# Patient Record
Sex: MALE | Race: WHITE | Employment: OTHER | URBAN - METROPOLITAN AREA
[De-identification: names, ages, dates, MRNs, and addresses within clinical notes are randomized per-mention and may not be internally consistent; named-entity substitution may affect disease eponyms.]

---

## 2017-01-09 ENCOUNTER — OFFICE VISIT (OUTPATIENT)
Dept: ONCOLOGY | Facility: CLINIC | Age: 82
End: 2017-01-09

## 2017-01-09 VITALS
RESPIRATION RATE: 16 BRPM | DIASTOLIC BLOOD PRESSURE: 89 MMHG | TEMPERATURE: 97.6 F | WEIGHT: 186.5 LBS | HEART RATE: 55 BPM | SYSTOLIC BLOOD PRESSURE: 185 MMHG | BODY MASS INDEX: 25.26 KG/M2

## 2017-01-09 DIAGNOSIS — C02.9 TONGUE CANCER (HCC): Primary | ICD-10-CM

## 2017-01-09 PROCEDURE — G8420 CALC BMI NORM PARAMETERS: HCPCS | Performed by: INTERNAL MEDICINE

## 2017-01-09 PROCEDURE — G8427 DOCREV CUR MEDS BY ELIG CLIN: HCPCS | Performed by: INTERNAL MEDICINE

## 2017-01-09 PROCEDURE — 1036F TOBACCO NON-USER: CPT | Performed by: INTERNAL MEDICINE

## 2017-01-09 PROCEDURE — G8484 FLU IMMUNIZE NO ADMIN: HCPCS | Performed by: INTERNAL MEDICINE

## 2017-01-09 PROCEDURE — 4040F PNEUMOC VAC/ADMIN/RCVD: CPT | Performed by: INTERNAL MEDICINE

## 2017-01-09 PROCEDURE — 99214 OFFICE O/P EST MOD 30 MIN: CPT | Performed by: INTERNAL MEDICINE

## 2017-01-09 PROCEDURE — 1123F ACP DISCUSS/DSCN MKR DOCD: CPT | Performed by: INTERNAL MEDICINE

## 2017-01-30 RX ORDER — GLIMEPIRIDE 4 MG/1
TABLET ORAL
Qty: 60 TABLET | Refills: 0 | Status: SHIPPED | OUTPATIENT
Start: 2017-01-30 | End: 2017-05-27 | Stop reason: SDUPTHER

## 2017-04-25 ENCOUNTER — HOSPITAL ENCOUNTER (OUTPATIENT)
Dept: RADIATION ONCOLOGY | Age: 82
Discharge: HOME | End: 2017-04-25
Payer: MEDICARE

## 2017-04-25 PROCEDURE — 99212 OFFICE O/P EST SF 10 MIN: CPT | Performed by: RADIOLOGY

## 2017-05-30 RX ORDER — GLIMEPIRIDE 4 MG/1
TABLET ORAL
Qty: 60 TABLET | Refills: 3 | Status: SHIPPED | OUTPATIENT
Start: 2017-05-30 | End: 2018-01-24 | Stop reason: SDUPTHER

## 2017-06-13 ENCOUNTER — OFFICE VISIT (OUTPATIENT)
Dept: INTERNAL MEDICINE CLINIC | Age: 82
End: 2017-06-13
Payer: MEDICARE

## 2017-06-13 VITALS
DIASTOLIC BLOOD PRESSURE: 76 MMHG | WEIGHT: 185 LBS | BODY MASS INDEX: 25.06 KG/M2 | SYSTOLIC BLOOD PRESSURE: 132 MMHG | HEIGHT: 72 IN

## 2017-06-13 DIAGNOSIS — K59.01 SLOW TRANSIT CONSTIPATION: ICD-10-CM

## 2017-06-13 DIAGNOSIS — E11.40 TYPE 2 DIABETES MELLITUS WITH DIABETIC NEUROPATHY, WITH LONG-TERM CURRENT USE OF INSULIN (HCC): Primary | ICD-10-CM

## 2017-06-13 DIAGNOSIS — I95.1 ORTHOSTATIC HYPOTENSION: ICD-10-CM

## 2017-06-13 DIAGNOSIS — Z79.4 TYPE 2 DIABETES MELLITUS WITH DIABETIC NEUROPATHY, WITH LONG-TERM CURRENT USE OF INSULIN (HCC): Primary | ICD-10-CM

## 2017-06-13 DIAGNOSIS — C02.9 TONGUE CANCER (HCC): ICD-10-CM

## 2017-06-13 DIAGNOSIS — E11.42 DIABETIC POLYNEUROPATHY ASSOCIATED WITH TYPE 2 DIABETES MELLITUS (HCC): ICD-10-CM

## 2017-06-13 PROCEDURE — 99214 OFFICE O/P EST MOD 30 MIN: CPT | Performed by: INTERNAL MEDICINE

## 2017-06-13 PROCEDURE — 1036F TOBACCO NON-USER: CPT | Performed by: INTERNAL MEDICINE

## 2017-06-13 PROCEDURE — G8427 DOCREV CUR MEDS BY ELIG CLIN: HCPCS | Performed by: INTERNAL MEDICINE

## 2017-06-13 PROCEDURE — G8419 CALC BMI OUT NRM PARAM NOF/U: HCPCS | Performed by: INTERNAL MEDICINE

## 2017-06-13 PROCEDURE — 1123F ACP DISCUSS/DSCN MKR DOCD: CPT | Performed by: INTERNAL MEDICINE

## 2017-06-13 PROCEDURE — 4040F PNEUMOC VAC/ADMIN/RCVD: CPT | Performed by: INTERNAL MEDICINE

## 2017-06-13 ASSESSMENT — ENCOUNTER SYMPTOMS
WHEEZING: 0
EYE DISCHARGE: 0
COUGH: 0
COLOR CHANGE: 0
SHORTNESS OF BREATH: 0
EYE PAIN: 0
TROUBLE SWALLOWING: 0
CONSTIPATION: 1
BLOOD IN STOOL: 0
DIARRHEA: 0
ABDOMINAL DISTENTION: 0

## 2017-06-13 ASSESSMENT — PATIENT HEALTH QUESTIONNAIRE - PHQ9
SUM OF ALL RESPONSES TO PHQ QUESTIONS 1-9: 0
1. LITTLE INTEREST OR PLEASURE IN DOING THINGS: 0
2. FEELING DOWN, DEPRESSED OR HOPELESS: 0
SUM OF ALL RESPONSES TO PHQ9 QUESTIONS 1 & 2: 0

## 2017-07-06 ENCOUNTER — HOSPITAL ENCOUNTER (OUTPATIENT)
Dept: CT IMAGING | Age: 82
Discharge: HOME | End: 2017-07-06
Attending: INTERNAL MEDICINE | Admitting: INTERNAL MEDICINE
Payer: MEDICARE

## 2017-07-06 DIAGNOSIS — C02.9 TONGUE CANCER (HCC): ICD-10-CM

## 2017-07-06 LAB
BUN BLDV-MCNC: 25 MG/DL (ref 8–23)
CREAT SERPL-MCNC: 0.77 MG/DL (ref 0.7–1.2)
GFR AFRICAN AMERICAN: >60 ML/MIN
GFR NON-AFRICAN AMERICAN: >60 ML/MIN
GFR SERPL CREATININE-BSD FRML MDRD: ABNORMAL ML/MIN/{1.73_M2}
GFR SERPL CREATININE-BSD FRML MDRD: ABNORMAL ML/MIN/{1.73_M2}

## 2017-07-06 PROCEDURE — 70491 CT SOFT TISSUE NECK W/DYE: CPT

## 2017-07-06 PROCEDURE — 6360000004 HC RX CONTRAST MEDICATION: Performed by: INTERNAL MEDICINE

## 2017-07-06 PROCEDURE — 36415 COLL VENOUS BLD VENIPUNCTURE: CPT

## 2017-07-06 PROCEDURE — 2580000003 HC RX 258: Performed by: INTERNAL MEDICINE

## 2017-07-06 PROCEDURE — 84520 ASSAY OF UREA NITROGEN: CPT

## 2017-07-06 PROCEDURE — 82565 ASSAY OF CREATININE: CPT

## 2017-07-06 RX ORDER — 0.9 % SODIUM CHLORIDE 0.9 %
100 INTRAVENOUS SOLUTION INTRAVENOUS ONCE
Status: COMPLETED | OUTPATIENT
Start: 2017-07-06 | End: 2017-07-06

## 2017-07-06 RX ORDER — SODIUM CHLORIDE 0.9 % (FLUSH) 0.9 %
10 SYRINGE (ML) INJECTION PRN
Status: DISCONTINUED | OUTPATIENT
Start: 2017-07-06 | End: 2017-07-09 | Stop reason: HOSPADM

## 2017-07-06 RX ADMIN — IOVERSOL 70 ML: 741 INJECTION INTRA-ARTERIAL; INTRAVENOUS at 11:00

## 2017-07-06 RX ADMIN — Medication 10 ML: at 11:07

## 2017-07-06 RX ADMIN — SODIUM CHLORIDE 100 ML: 9 INJECTION, SOLUTION INTRAVENOUS at 11:00

## 2017-07-10 ENCOUNTER — OFFICE VISIT (OUTPATIENT)
Dept: ONCOLOGY | Age: 82
End: 2017-07-10
Payer: MEDICARE

## 2017-07-10 VITALS
HEART RATE: 47 BPM | TEMPERATURE: 98.3 F | SYSTOLIC BLOOD PRESSURE: 117 MMHG | BODY MASS INDEX: 25.13 KG/M2 | WEIGHT: 185.5 LBS | DIASTOLIC BLOOD PRESSURE: 66 MMHG

## 2017-07-10 DIAGNOSIS — C02.9 TONGUE CANCER (HCC): Primary | ICD-10-CM

## 2017-07-10 PROCEDURE — 99214 OFFICE O/P EST MOD 30 MIN: CPT | Performed by: INTERNAL MEDICINE

## 2017-07-10 PROCEDURE — 4040F PNEUMOC VAC/ADMIN/RCVD: CPT | Performed by: INTERNAL MEDICINE

## 2017-07-10 PROCEDURE — 1036F TOBACCO NON-USER: CPT | Performed by: INTERNAL MEDICINE

## 2017-07-10 PROCEDURE — G8419 CALC BMI OUT NRM PARAM NOF/U: HCPCS | Performed by: INTERNAL MEDICINE

## 2017-07-10 PROCEDURE — 99211 OFF/OP EST MAY X REQ PHY/QHP: CPT

## 2017-07-10 PROCEDURE — G8427 DOCREV CUR MEDS BY ELIG CLIN: HCPCS | Performed by: INTERNAL MEDICINE

## 2017-07-10 PROCEDURE — 1123F ACP DISCUSS/DSCN MKR DOCD: CPT | Performed by: INTERNAL MEDICINE

## 2017-07-10 RX ORDER — HYDROCHLOROTHIAZIDE 12.5 MG/1
12.5 TABLET ORAL DAILY
COMMUNITY
Start: 2017-06-24 | End: 2018-10-22 | Stop reason: ALTCHOICE

## 2017-07-10 RX ORDER — LISINOPRIL 5 MG/1
5 TABLET ORAL
COMMUNITY
End: 2019-09-27 | Stop reason: SDUPTHER

## 2017-07-17 ENCOUNTER — OFFICE VISIT (OUTPATIENT)
Dept: INTERNAL MEDICINE CLINIC | Age: 82
End: 2017-07-17
Payer: MEDICARE

## 2017-07-17 ENCOUNTER — HOSPITAL ENCOUNTER (OUTPATIENT)
Age: 82
Setting detail: SPECIMEN
Discharge: HOME | End: 2017-07-17
Payer: MEDICARE

## 2017-07-17 VITALS
BODY MASS INDEX: 24.79 KG/M2 | DIASTOLIC BLOOD PRESSURE: 72 MMHG | SYSTOLIC BLOOD PRESSURE: 134 MMHG | WEIGHT: 183 LBS | HEIGHT: 72 IN

## 2017-07-17 DIAGNOSIS — E11.40 TYPE 2 DIABETES MELLITUS WITH DIABETIC NEUROPATHY, WITHOUT LONG-TERM CURRENT USE OF INSULIN (HCC): Primary | ICD-10-CM

## 2017-07-17 DIAGNOSIS — I95.1 ORTHOSTATIC HYPOTENSION: ICD-10-CM

## 2017-07-17 DIAGNOSIS — C02.9 TONGUE CANCER (HCC): ICD-10-CM

## 2017-07-17 DIAGNOSIS — I50.42 CHRONIC COMBINED SYSTOLIC AND DIASTOLIC CONGESTIVE HEART FAILURE (HCC): ICD-10-CM

## 2017-07-17 DIAGNOSIS — E11.40 TYPE 2 DIABETES MELLITUS WITH DIABETIC NEUROPATHY, WITHOUT LONG-TERM CURRENT USE OF INSULIN (HCC): ICD-10-CM

## 2017-07-17 DIAGNOSIS — R26.81 UNSTEADY GAIT: ICD-10-CM

## 2017-07-17 LAB
CREATININE URINE: 131.6 MG/DL (ref 39–259)
ESTIMATED AVERAGE GLUCOSE: 128 MG/DL
HBA1C MFR BLD: 6.1 % (ref 4–6)
MICROALBUMIN/CREAT 24H UR: 138 MG/L
MICROALBUMIN/CREAT UR-RTO: 105 MCG/MG CREAT

## 2017-07-17 PROCEDURE — 4040F PNEUMOC VAC/ADMIN/RCVD: CPT | Performed by: INTERNAL MEDICINE

## 2017-07-17 PROCEDURE — G8427 DOCREV CUR MEDS BY ELIG CLIN: HCPCS | Performed by: INTERNAL MEDICINE

## 2017-07-17 PROCEDURE — 1123F ACP DISCUSS/DSCN MKR DOCD: CPT | Performed by: INTERNAL MEDICINE

## 2017-07-17 PROCEDURE — G8420 CALC BMI NORM PARAMETERS: HCPCS | Performed by: INTERNAL MEDICINE

## 2017-07-17 PROCEDURE — 99214 OFFICE O/P EST MOD 30 MIN: CPT | Performed by: INTERNAL MEDICINE

## 2017-07-17 PROCEDURE — 1036F TOBACCO NON-USER: CPT | Performed by: INTERNAL MEDICINE

## 2017-07-17 ASSESSMENT — ENCOUNTER SYMPTOMS
WHEEZING: 0
SHORTNESS OF BREATH: 0
COLOR CHANGE: 0
ABDOMINAL DISTENTION: 0
TROUBLE SWALLOWING: 0
EYE PAIN: 0
DIARRHEA: 0
EYE DISCHARGE: 0
COUGH: 0
BLOOD IN STOOL: 0

## 2017-07-31 RX ORDER — SIMVASTATIN 20 MG
TABLET ORAL
Qty: 90 TABLET | Refills: 3 | Status: SHIPPED | OUTPATIENT
Start: 2017-07-31 | End: 2018-10-01 | Stop reason: SDUPTHER

## 2017-09-01 ENCOUNTER — HOSPITAL ENCOUNTER (OUTPATIENT)
Age: 82
Setting detail: SPECIMEN
Discharge: HOME | End: 2017-09-01
Payer: MEDICARE

## 2017-09-01 ENCOUNTER — OFFICE VISIT (OUTPATIENT)
Dept: INTERNAL MEDICINE CLINIC | Age: 82
End: 2017-09-01
Payer: MEDICARE

## 2017-09-01 VITALS
SYSTOLIC BLOOD PRESSURE: 118 MMHG | HEIGHT: 72 IN | WEIGHT: 178 LBS | DIASTOLIC BLOOD PRESSURE: 60 MMHG | BODY MASS INDEX: 24.11 KG/M2

## 2017-09-01 DIAGNOSIS — I50.42 CHRONIC COMBINED SYSTOLIC AND DIASTOLIC CONGESTIVE HEART FAILURE (HCC): Primary | ICD-10-CM

## 2017-09-01 DIAGNOSIS — E11.40 TYPE 2 DIABETES MELLITUS WITH DIABETIC NEUROPATHY, WITH LONG-TERM CURRENT USE OF INSULIN (HCC): ICD-10-CM

## 2017-09-01 DIAGNOSIS — E11.42 DIABETIC POLYNEUROPATHY ASSOCIATED WITH TYPE 2 DIABETES MELLITUS (HCC): ICD-10-CM

## 2017-09-01 DIAGNOSIS — K12.30 ORAL MUCOSITIS: ICD-10-CM

## 2017-09-01 DIAGNOSIS — Z79.4 TYPE 2 DIABETES MELLITUS WITH DIABETIC NEUROPATHY, WITH LONG-TERM CURRENT USE OF INSULIN (HCC): ICD-10-CM

## 2017-09-01 PROCEDURE — 99214 OFFICE O/P EST MOD 30 MIN: CPT | Performed by: INTERNAL MEDICINE

## 2017-09-01 PROCEDURE — G8427 DOCREV CUR MEDS BY ELIG CLIN: HCPCS | Performed by: INTERNAL MEDICINE

## 2017-09-01 PROCEDURE — G8420 CALC BMI NORM PARAMETERS: HCPCS | Performed by: INTERNAL MEDICINE

## 2017-09-01 PROCEDURE — 1036F TOBACCO NON-USER: CPT | Performed by: INTERNAL MEDICINE

## 2017-09-01 PROCEDURE — 4040F PNEUMOC VAC/ADMIN/RCVD: CPT | Performed by: INTERNAL MEDICINE

## 2017-09-01 PROCEDURE — 1123F ACP DISCUSS/DSCN MKR DOCD: CPT | Performed by: INTERNAL MEDICINE

## 2017-09-01 ASSESSMENT — ENCOUNTER SYMPTOMS
EYE DISCHARGE: 0
COUGH: 0
COLOR CHANGE: 0
ABDOMINAL DISTENTION: 0
SHORTNESS OF BREATH: 0
DIARRHEA: 0
WHEEZING: 0
EYE PAIN: 0
CONSTIPATION: 1
BACK PAIN: 1
BLOOD IN STOOL: 0
TROUBLE SWALLOWING: 0

## 2017-09-07 DIAGNOSIS — E10.9 TYPE 1 DIABETES MELLITUS WITHOUT COMPLICATION (HCC): ICD-10-CM

## 2017-09-08 RX ORDER — INSULIN GLARGINE 100 [IU]/ML
INJECTION, SOLUTION SUBCUTANEOUS
Qty: 15 PEN | Refills: 11 | Status: SHIPPED | OUTPATIENT
Start: 2017-09-08 | End: 2018-04-06

## 2017-09-11 DIAGNOSIS — E10.9 TYPE 1 DIABETES MELLITUS WITHOUT COMPLICATION (HCC): ICD-10-CM

## 2017-09-11 RX ORDER — INSULIN GLARGINE 100 [IU]/ML
INJECTION, SOLUTION SUBCUTANEOUS
Qty: 15 ML | Refills: 5 | Status: SHIPPED | OUTPATIENT
Start: 2017-09-11 | End: 2018-03-19 | Stop reason: SDUPTHER

## 2017-12-08 ENCOUNTER — HOSPITAL ENCOUNTER (OUTPATIENT)
Age: 82
Setting detail: SPECIMEN
Discharge: HOME | End: 2017-12-08
Payer: MEDICARE

## 2017-12-08 ENCOUNTER — OFFICE VISIT (OUTPATIENT)
Dept: INTERNAL MEDICINE CLINIC | Age: 82
End: 2017-12-08
Payer: MEDICARE

## 2017-12-08 VITALS
SYSTOLIC BLOOD PRESSURE: 142 MMHG | WEIGHT: 183 LBS | HEART RATE: 58 BPM | OXYGEN SATURATION: 98 % | DIASTOLIC BLOOD PRESSURE: 70 MMHG | BODY MASS INDEX: 24.79 KG/M2 | HEIGHT: 72 IN

## 2017-12-08 DIAGNOSIS — Z79.4 TYPE 2 DIABETES MELLITUS WITH DIABETIC NEUROPATHY, WITH LONG-TERM CURRENT USE OF INSULIN (HCC): ICD-10-CM

## 2017-12-08 DIAGNOSIS — F41.9 ANXIETY: ICD-10-CM

## 2017-12-08 DIAGNOSIS — Z79.4 TYPE 2 DIABETES MELLITUS WITH DIABETIC NEUROPATHY, WITH LONG-TERM CURRENT USE OF INSULIN (HCC): Primary | ICD-10-CM

## 2017-12-08 DIAGNOSIS — M05.20 RHEUMATOID ARTERITIS (HCC): ICD-10-CM

## 2017-12-08 DIAGNOSIS — K59.04 CHRONIC IDIOPATHIC CONSTIPATION: ICD-10-CM

## 2017-12-08 DIAGNOSIS — E11.40 TYPE 2 DIABETES MELLITUS WITH DIABETIC NEUROPATHY, WITH LONG-TERM CURRENT USE OF INSULIN (HCC): ICD-10-CM

## 2017-12-08 DIAGNOSIS — I50.42 CHRONIC COMBINED SYSTOLIC AND DIASTOLIC CONGESTIVE HEART FAILURE (HCC): ICD-10-CM

## 2017-12-08 DIAGNOSIS — E11.40 TYPE 2 DIABETES MELLITUS WITH DIABETIC NEUROPATHY, WITH LONG-TERM CURRENT USE OF INSULIN (HCC): Primary | ICD-10-CM

## 2017-12-08 LAB
CREATININE URINE: 108.4 MG/DL (ref 39–259)
ESTIMATED AVERAGE GLUCOSE: 128 MG/DL
HBA1C MFR BLD: 6.1 % (ref 4–6)
MICROALBUMIN/CREAT 24H UR: 154 MG/L
MICROALBUMIN/CREAT UR-RTO: 142 MCG/MG CREAT

## 2017-12-08 PROCEDURE — 1123F ACP DISCUSS/DSCN MKR DOCD: CPT | Performed by: INTERNAL MEDICINE

## 2017-12-08 PROCEDURE — 1036F TOBACCO NON-USER: CPT | Performed by: INTERNAL MEDICINE

## 2017-12-08 PROCEDURE — G8484 FLU IMMUNIZE NO ADMIN: HCPCS | Performed by: INTERNAL MEDICINE

## 2017-12-08 PROCEDURE — G8427 DOCREV CUR MEDS BY ELIG CLIN: HCPCS | Performed by: INTERNAL MEDICINE

## 2017-12-08 PROCEDURE — 4040F PNEUMOC VAC/ADMIN/RCVD: CPT | Performed by: INTERNAL MEDICINE

## 2017-12-08 PROCEDURE — 99214 OFFICE O/P EST MOD 30 MIN: CPT | Performed by: INTERNAL MEDICINE

## 2017-12-08 PROCEDURE — G8420 CALC BMI NORM PARAMETERS: HCPCS | Performed by: INTERNAL MEDICINE

## 2017-12-08 ASSESSMENT — ENCOUNTER SYMPTOMS
SHORTNESS OF BREATH: 0
COUGH: 0
CONSTIPATION: 1
DIARRHEA: 0
COLOR CHANGE: 0
ABDOMINAL DISTENTION: 0
EYE PAIN: 0
WHEEZING: 0
EYE DISCHARGE: 0
TROUBLE SWALLOWING: 0
BLOOD IN STOOL: 0

## 2017-12-08 NOTE — PROGRESS NOTES
Subjective:      HYPERTENSION visit     BP Readings from Last 3 Encounters:   12/08/17 (!) 142/70   09/01/17 118/60   07/17/17 134/72       LDL Cholesterol (mg/dL)   Date Value   11/26/2013 68     HDL (mg/dL)   Date Value   11/26/2013 37 (L)     BUN (mg/dL)   Date Value   07/06/2017 25 (H)     CREATININE (mg/dL)   Date Value   07/06/2017 0.77     Glucose (mg/dL)   Date Value   02/16/2016 217 (H)              Have you changed or started any medications since your last visit including any over-the-counter medicines, vitamins, or herbal medicines? no   Have you stopped taking any of your medications? Is so, why? -  no  Are you having any side effects from any of your medications? - no    Have you seen any other physician or provider since your last visit?  no   Have you had any other diagnostic tests since your last visit?  no   Have you been seen in the emergency room and/or had an admission in a hospital since we last saw you?  no   Have you had your routine dental cleaning in the past 6 months?  no     Do you have an active MyChart account? If no, what is the barrier? No: needs new code    Patient Care Team:  Esther Glynn MD as PCP - Omid Villasenor MD as Consulting Physician (Hematology and Oncology)  Wash Lundborg, MD as Physician (Radiation Oncology)  Marcus Ellison MD as Consulting Physician (Otolaryngology)  Hellen Fowler MD as Surgeon (Cardiology)    Medical History Review  Past Medical, Family, and Social History reviewed and does not contribute to the patient presenting condition    Health Maintenance   Topic Date Due    DTaP/Tdap/Td vaccine (1 - Tdap) 12/11/1951    Zostavax vaccine  12/11/1992    Pneumococcal low/med risk (1 of 2 - PCV13) 12/11/1997    Flu vaccine (1) 09/01/2017         Patient ID: Teresa Roblero is a 80 y.o. male.     Diabetes   Duration more than 7 years  Modifying factors on Glucophage and other med  Severity controlled sever  Associated signs and symtoms neuropathy/ckd/ CAD. aggravated with sugar diet and better with low sugar diet     HTN  Onset more than 2 years ago  wagner mild to mod  Controlled with current po meds  Not associated with headaches or blurry vision  No chest pain          Review of Systems   Constitutional: Negative for appetite change, diaphoresis and fatigue. HENT: Negative for ear discharge and trouble swallowing. Eyes: Negative for pain and discharge. Respiratory: Negative for cough, shortness of breath and wheezing. Cardiovascular: Negative for chest pain and palpitations. Gastrointestinal: Positive for constipation. Negative for abdominal distention, blood in stool and diarrhea. Endocrine: Negative for polydipsia and polyphagia. Genitourinary: Negative for difficulty urinating and frequency. Musculoskeletal: Positive for arthralgias. Negative for gait problem, myalgias and neck pain. Skin: Negative for color change and rash. Allergic/Immunologic: Negative for environmental allergies and food allergies. Neurological: Negative for dizziness and headaches. Hematological: Negative for adenopathy. Does not bruise/bleed easily. Psychiatric/Behavioral: Negative for behavioral problems and sleep disturbance. Objective:   Physical Exam   Constitutional: He is oriented to person, place, and time. He appears well-developed and well-nourished. HENT:   Head: Normocephalic and atraumatic. Eyes: Conjunctivae and EOM are normal. Right eye exhibits no discharge. Left eye exhibits no discharge. Right conjunctiva is not injected. Left conjunctiva is not injected. Right eye exhibits normal extraocular motion. Left eye exhibits normal extraocular motion. Neck: Normal range of motion. Neck supple. No JVD present. No edema and no erythema present. No thyroid mass and no thyromegaly present. Cardiovascular: Normal rate and regular rhythm. Exam reveals no friction rub. No murmur heard.   Pulmonary/Chest: Effort normal and tablets by mouth daily 90 tablet 3    Insulin Pen Needle (PEN NEEDLES 31GX5/16\") 31G X 8 MM MISC 1 each by Does not apply route 2 times daily. No current facility-administered medications for this visit. ALLERGIES:  No Known Allergies    Social History   Substance Use Topics    Smoking status: Former Smoker     Packs/day: 2.00     Years: 40.00     Types: Cigarettes     Quit date: 11/17/1991    Smokeless tobacco: Never Used    Alcohol use 0.0 oz/week      Comment: rare      Body mass index is 24.79 kg/m². BP (!) 142/70   Pulse 58   Ht 6' 0.05\" (1.83 m)   Wt 183 lb (83 kg)   SpO2 98%   BMI 24.79 kg/m²     Lab Results   Component Value Date     02/16/2016    K 4.0 02/16/2016     02/16/2016    CO2 26 02/16/2016    BUN 25 07/06/2017    CREATININE 0.77 07/06/2017    GLUCOSE 217 02/16/2016    CALCIUM 8.9 02/16/2016    PROT 6.6 02/16/2016    LABALBU 3.7 02/16/2016    BILITOT 0.79 02/16/2016    ALKPHOS 80 02/16/2016    AST 11 02/16/2016    ALT 9 02/16/2016       Lab Results   Component Value Date    WBC 10.1 05/02/2016    RBC 4.64 05/02/2016    HGB 13.5 05/02/2016    HCT 41.1 05/02/2016    MCV 89 05/02/2016    MCH 29.1 05/02/2016    MCHC 32.8 05/02/2016    RDW 15.3 02/16/2016     05/02/2016    MPV 11.3 05/02/2016       Lab Results   Component Value Date    LABA1C 6.1 07/17/2017       Lab Results   Component Value Date    HDL 37 11/26/2013    LDLCHOLESTEROL 68 11/26/2013       Assessment:      1. Type 2 diabetes mellitus with diabetic neuropathy, with long-term current use of insulin (HCC)  Hemoglobin A1C    Microalbumin, Ur   2. Rheumatoid arteritis     3. Chronic combined systolic and diastolic congestive heart failure ([address]     4. Anxiety     5. Chronic idiopathic constipation             Plan:      No Follow-up on file.   Orders Placed This Encounter   Procedures    Hemoglobin A1C     Standing Status:   Future     Standing Expiration Date:   3/8/2018    Microalbumin, Ur

## 2018-01-02 ENCOUNTER — HOSPITAL ENCOUNTER (OUTPATIENT)
Dept: CT IMAGING | Age: 83
Discharge: HOME | End: 2018-01-02
Payer: MEDICARE

## 2018-01-02 DIAGNOSIS — C02.9 TONGUE CANCER (HCC): ICD-10-CM

## 2018-01-02 LAB
BUN BLDV-MCNC: 29 MG/DL (ref 8–23)
CREAT SERPL-MCNC: 0.72 MG/DL (ref 0.7–1.2)
GFR AFRICAN AMERICAN: >60 ML/MIN
GFR NON-AFRICAN AMERICAN: >60 ML/MIN
GFR SERPL CREATININE-BSD FRML MDRD: ABNORMAL ML/MIN/{1.73_M2}
GFR SERPL CREATININE-BSD FRML MDRD: ABNORMAL ML/MIN/{1.73_M2}

## 2018-01-02 PROCEDURE — 84520 ASSAY OF UREA NITROGEN: CPT

## 2018-01-02 PROCEDURE — 70491 CT SOFT TISSUE NECK W/DYE: CPT

## 2018-01-02 PROCEDURE — 82565 ASSAY OF CREATININE: CPT

## 2018-01-02 PROCEDURE — 2580000003 HC RX 258: Performed by: INTERNAL MEDICINE

## 2018-01-02 PROCEDURE — 36415 COLL VENOUS BLD VENIPUNCTURE: CPT

## 2018-01-02 PROCEDURE — 6360000004 HC RX CONTRAST MEDICATION: Performed by: INTERNAL MEDICINE

## 2018-01-02 RX ORDER — 0.9 % SODIUM CHLORIDE 0.9 %
100 INTRAVENOUS SOLUTION INTRAVENOUS ONCE
Status: COMPLETED | OUTPATIENT
Start: 2018-01-02 | End: 2018-01-02

## 2018-01-02 RX ORDER — SODIUM CHLORIDE 0.9 % (FLUSH) 0.9 %
10 SYRINGE (ML) INJECTION PRN
Status: DISCONTINUED | OUTPATIENT
Start: 2018-01-02 | End: 2018-01-05 | Stop reason: HOSPADM

## 2018-01-02 RX ADMIN — Medication 10 ML: at 10:04

## 2018-01-02 RX ADMIN — IOPAMIDOL 70 ML: 755 INJECTION, SOLUTION INTRAVENOUS at 10:03

## 2018-01-02 RX ADMIN — SODIUM CHLORIDE 100 ML: 9 INJECTION, SOLUTION INTRAVENOUS at 10:03

## 2018-01-22 ENCOUNTER — OFFICE VISIT (OUTPATIENT)
Dept: ONCOLOGY | Age: 83
End: 2018-01-22
Payer: MEDICARE

## 2018-01-22 VITALS
RESPIRATION RATE: 16 BRPM | BODY MASS INDEX: 24.94 KG/M2 | HEART RATE: 62 BPM | TEMPERATURE: 98.4 F | SYSTOLIC BLOOD PRESSURE: 148 MMHG | DIASTOLIC BLOOD PRESSURE: 78 MMHG | WEIGHT: 184.1 LBS

## 2018-01-22 DIAGNOSIS — C02.9 TONGUE CANCER (HCC): Primary | ICD-10-CM

## 2018-01-22 PROCEDURE — 99214 OFFICE O/P EST MOD 30 MIN: CPT | Performed by: INTERNAL MEDICINE

## 2018-01-22 PROCEDURE — 4040F PNEUMOC VAC/ADMIN/RCVD: CPT | Performed by: INTERNAL MEDICINE

## 2018-01-22 PROCEDURE — 1036F TOBACCO NON-USER: CPT | Performed by: INTERNAL MEDICINE

## 2018-01-22 PROCEDURE — G8427 DOCREV CUR MEDS BY ELIG CLIN: HCPCS | Performed by: INTERNAL MEDICINE

## 2018-01-22 PROCEDURE — 99211 OFF/OP EST MAY X REQ PHY/QHP: CPT

## 2018-01-22 PROCEDURE — G8484 FLU IMMUNIZE NO ADMIN: HCPCS | Performed by: INTERNAL MEDICINE

## 2018-01-22 PROCEDURE — G8420 CALC BMI NORM PARAMETERS: HCPCS | Performed by: INTERNAL MEDICINE

## 2018-01-22 PROCEDURE — 1123F ACP DISCUSS/DSCN MKR DOCD: CPT | Performed by: INTERNAL MEDICINE

## 2018-02-16 ENCOUNTER — TELEPHONE (OUTPATIENT)
Dept: ONCOLOGY | Age: 83
End: 2018-02-16

## 2018-02-16 NOTE — TELEPHONE ENCOUNTER
Patient's spouse called in to [facility] to report that while on vacation in [place], the patient was experiencing profuse hematuria and was taken to the closest ED and transferred to the [address] [place] [address] in [place], [place]. The patient was found to have enlarged prostate and lesion in his bladder; lesion biopsy showed positive for malignancy. Patient and spouse returning home 3/1/18 and request appointment with Dr. Quintin Desai. Kylee Dinh, notified and patient scheduled for office visit 3/5/18 at 2:40 p.m.; caller verbalized understanding and appreciation.   Chart updated via [employer] by Renée Fong.

## 2018-03-05 ENCOUNTER — OFFICE VISIT (OUTPATIENT)
Dept: ONCOLOGY | Age: 83
End: 2018-03-05
Payer: MEDICARE

## 2018-03-05 ENCOUNTER — TELEPHONE (OUTPATIENT)
Dept: ONCOLOGY | Age: 83
End: 2018-03-05

## 2018-03-05 VITALS
TEMPERATURE: 98.1 F | HEART RATE: 62 BPM | WEIGHT: 182.4 LBS | RESPIRATION RATE: 16 BRPM | SYSTOLIC BLOOD PRESSURE: 134 MMHG | DIASTOLIC BLOOD PRESSURE: 70 MMHG | BODY MASS INDEX: 24.71 KG/M2

## 2018-03-05 DIAGNOSIS — C67.1 MALIGNANT NEOPLASM OF DOME OF URINARY BLADDER (HCC): Primary | ICD-10-CM

## 2018-03-05 DIAGNOSIS — C02.9 TONGUE CANCER (HCC): ICD-10-CM

## 2018-03-05 PROCEDURE — 1123F ACP DISCUSS/DSCN MKR DOCD: CPT | Performed by: INTERNAL MEDICINE

## 2018-03-05 PROCEDURE — G8420 CALC BMI NORM PARAMETERS: HCPCS | Performed by: INTERNAL MEDICINE

## 2018-03-05 PROCEDURE — 1036F TOBACCO NON-USER: CPT | Performed by: INTERNAL MEDICINE

## 2018-03-05 PROCEDURE — G8427 DOCREV CUR MEDS BY ELIG CLIN: HCPCS | Performed by: INTERNAL MEDICINE

## 2018-03-05 PROCEDURE — 4040F PNEUMOC VAC/ADMIN/RCVD: CPT | Performed by: INTERNAL MEDICINE

## 2018-03-05 PROCEDURE — G8484 FLU IMMUNIZE NO ADMIN: HCPCS | Performed by: INTERNAL MEDICINE

## 2018-03-05 PROCEDURE — 99215 OFFICE O/P EST HI 40 MIN: CPT | Performed by: INTERNAL MEDICINE

## 2018-03-05 RX ORDER — M-VIT,TX,IRON,MINS/CALC/FOLIC 27MG-0.4MG
1 TABLET ORAL DAILY
COMMUNITY
End: 2019-04-15

## 2018-03-05 NOTE — PROGRESS NOTES
Hyperlipidemia; Hypertension; Tongue cancer ([facility] [address] Type II or unspecified type diabetes mellitus without mention of complication, not stated as uncontrolled; and Wears glasses. PAST SURGICAL HISTORY: has a past surgical history that includes Appendectomy; Cholecystectomy; Tongue Biopsy; bronchoscopy; Colonoscopy; hernia repair; Clavicle surgery; Tonsillectomy; other surgical history (Left, 11/17/14); and Gastrostomy tube placement (11/25/14). CURRENT MEDICATIONS:  has a current medication list which includes the following prescription(s): therapeutic multivitamin-minerals, metoprolol tartrate, glimepiride, lantus solostar, lantus solostar, simvastatin, lisinopril, hydrochlorothiazide, methotrexate, glucose blood vi test strips, lancets, glucose monitoring kit, pen needles 63WY1/52\", and folic acid. ALLERGIES:  has No Known Allergies. FAMILY HISTORY: Negative for any hematological or oncological conditions. SOCIAL HISTORY:  reports that he has quit smoking in 1991. His smoking use included Cigarettes. He smoked 2 packs per day for 40 years. He has never used smokeless tobacco. He reports that  drinks alcohol. He reports that he does not use illicit drugs. REVIEW OF SYSTEMS:     · General: No weakness or fatigue. No decreased appetite. No fever or chills. · Eyes: No blurred vision, eye pain or double vision. · Ears: No hearing problems or drainage. No tinnitus. · Throat: No sore throat, problems with swallowing or dysphagia. · Respiratory: No cough, sputum or hemoptysis. No shortness of breath. No pleuritic chest pain. · Cardiovascular: No chest pain, orthopnea or PND. No lower extremity edema. No palpitation. · Gastrointestinal: No problems with swallowing. No abdominal pain or bloating. No nausea or vomiting. No diarrhea or constipation. No GI bleeding. · Genitourinary: As above. · Musculoskeletal: No muscle aches or pains. No limitation of movement. No back pain.  No gait lingual frenulum with likely    invasion of the lingual artery and nerve. 2. Level II conglomerate heterogeneously enhancing partially necrotic    lymph nodes with ill-defined borders and also fat planes with the    anterior belly of the sternocleidomastoid muscle consistent with invasion    and extracapsular spread. Heterogeneously enhancing borderline prominent    level III right-sided lymph node      CT neck 2/25/15:  IMPRESSION:    1. Significant interval decrease in size of the previously seen ulcerated    necrotic mass along the right aspect of the tongue base. Small residual    mucosal component however cannot be entirely excluded. Please consider a    direct visual inspection   2. Significant interval decrease in size of the right-sided level II    conglomerate of the cervical lymph nodes since previous examination. Lab Results   Component Value Date    WBC 10.1 05/02/2016    HGB 13.5 05/02/2016    HCT 41.1 05/02/2016    MCV 89 05/02/2016     05/02/2016       Chemistry        Component Value Date/Time     02/16/2016 1405    K 4.0 02/16/2016 1405     02/16/2016 1405    CO2 26 02/16/2016 1405    BUN 29 (H) 01/02/2018 0926    CREATININE 0.72 01/02/2018 0926        Component Value Date/Time    CALCIUM 8.9 02/16/2016 1405    ALKPHOS 80 02/16/2016 1405    AST 11 02/16/2016 1405    ALT 9 02/16/2016 1405    BILITOT 0.79 02/16/2016 1405        PET/CT scan 5/4/15:  Impression:      Favorable interval response to radiation/chemotherapy. There is only    minimal thickening of right posterior glossal tissue with significant    reduction in glucose metabolic activity, however, SUV still in    intermediate range likely representing postradiation inflammation. Previously noted a small FDG avid right level II jugulodigastric lymph    node showing no abnormal glucose metabolic activity.       No abnormal radiotracer activity within the abdominal viscera or    involving axial or appendicular

## 2018-03-19 ENCOUNTER — TELEPHONE (OUTPATIENT)
Dept: UROLOGY | Age: 83
End: 2018-03-19

## 2018-03-19 ENCOUNTER — HOSPITAL ENCOUNTER (OUTPATIENT)
Dept: NUCLEAR MEDICINE | Age: 83
Discharge: HOME | End: 2018-03-21
Payer: MEDICARE

## 2018-03-19 ENCOUNTER — OFFICE VISIT (OUTPATIENT)
Dept: UROLOGY | Age: 83
End: 2018-03-19
Payer: MEDICARE

## 2018-03-19 ENCOUNTER — HOSPITAL ENCOUNTER (OUTPATIENT)
Dept: PREADMISSION TESTING | Age: 83
Discharge: HOME | End: 2018-03-23
Payer: MEDICARE

## 2018-03-19 VITALS
HEIGHT: 73 IN | BODY MASS INDEX: 24.52 KG/M2 | HEART RATE: 54 BPM | TEMPERATURE: 98 F | DIASTOLIC BLOOD PRESSURE: 63 MMHG | OXYGEN SATURATION: 97 % | SYSTOLIC BLOOD PRESSURE: 153 MMHG | WEIGHT: 185 LBS | RESPIRATION RATE: 16 BRPM

## 2018-03-19 DIAGNOSIS — R31.0 GROSS HEMATURIA: Primary | ICD-10-CM

## 2018-03-19 DIAGNOSIS — C67.9 MALIGNANT NEOPLASM OF URINARY BLADDER, UNSPECIFIED SITE (HCC): ICD-10-CM

## 2018-03-19 DIAGNOSIS — R35.1 NOCTURIA: ICD-10-CM

## 2018-03-19 DIAGNOSIS — C67.1 MALIGNANT NEOPLASM OF DOME OF URINARY BLADDER (HCC): ICD-10-CM

## 2018-03-19 LAB
ANION GAP SERPL CALCULATED.3IONS-SCNC: 9 MMOL/L (ref 9–17)
BUN BLDV-MCNC: 21 MG/DL (ref 8–23)
CHLORIDE BLD-SCNC: 101 MMOL/L (ref 98–107)
CO2: 29 MMOL/L (ref 20–31)
CREAT SERPL-MCNC: 0.64 MG/DL (ref 0.7–1.2)
GFR AFRICAN AMERICAN: >60 ML/MIN
GFR NON-AFRICAN AMERICAN: >60 ML/MIN
GFR SERPL CREATININE-BSD FRML MDRD: ABNORMAL ML/MIN/{1.73_M2}
GFR SERPL CREATININE-BSD FRML MDRD: ABNORMAL ML/MIN/{1.73_M2}
GLUCOSE BLD-MCNC: 78 MG/DL (ref 70–99)
HCT VFR BLD CALC: 33.9 % (ref 40.7–50.3)
HEMOGLOBIN: 10.2 G/DL (ref 13–17)
POTASSIUM SERPL-SCNC: 4 MMOL/L (ref 3.7–5.3)
SODIUM BLD-SCNC: 139 MMOL/L (ref 135–144)

## 2018-03-19 PROCEDURE — 87086 URINE CULTURE/COLONY COUNT: CPT

## 2018-03-19 PROCEDURE — G8427 DOCREV CUR MEDS BY ELIG CLIN: HCPCS | Performed by: UROLOGY

## 2018-03-19 PROCEDURE — 84520 ASSAY OF UREA NITROGEN: CPT

## 2018-03-19 PROCEDURE — 1036F TOBACCO NON-USER: CPT | Performed by: UROLOGY

## 2018-03-19 PROCEDURE — 99204 OFFICE O/P NEW MOD 45 MIN: CPT | Performed by: UROLOGY

## 2018-03-19 PROCEDURE — 82947 ASSAY GLUCOSE BLOOD QUANT: CPT

## 2018-03-19 PROCEDURE — 4040F PNEUMOC VAC/ADMIN/RCVD: CPT | Performed by: UROLOGY

## 2018-03-19 PROCEDURE — G8420 CALC BMI NORM PARAMETERS: HCPCS | Performed by: UROLOGY

## 2018-03-19 PROCEDURE — 85014 HEMATOCRIT: CPT

## 2018-03-19 PROCEDURE — 82565 ASSAY OF CREATININE: CPT

## 2018-03-19 PROCEDURE — 80051 ELECTROLYTE PANEL: CPT

## 2018-03-19 PROCEDURE — G8484 FLU IMMUNIZE NO ADMIN: HCPCS | Performed by: UROLOGY

## 2018-03-19 PROCEDURE — 78815 PET IMAGE W/CT SKULL-THIGH: CPT

## 2018-03-19 PROCEDURE — 85018 HEMOGLOBIN: CPT

## 2018-03-19 PROCEDURE — A9552 F18 FDG: HCPCS | Performed by: INTERNAL MEDICINE

## 2018-03-19 PROCEDURE — 3430000000 HC RX DIAGNOSTIC RADIOPHARMACEUTICAL: Performed by: INTERNAL MEDICINE

## 2018-03-19 PROCEDURE — 1123F ACP DISCUSS/DSCN MKR DOCD: CPT | Performed by: UROLOGY

## 2018-03-19 PROCEDURE — 36415 COLL VENOUS BLD VENIPUNCTURE: CPT

## 2018-03-19 RX ORDER — SODIUM CHLORIDE, SODIUM LACTATE, POTASSIUM CHLORIDE, CALCIUM CHLORIDE 600; 310; 30; 20 MG/100ML; MG/100ML; MG/100ML; MG/100ML
1000 INJECTION, SOLUTION INTRAVENOUS CONTINUOUS
Status: CANCELLED | OUTPATIENT
Start: 2018-03-19

## 2018-03-19 RX ORDER — TAMSULOSIN HYDROCHLORIDE 0.4 MG/1
0.4 CAPSULE ORAL
COMMUNITY
End: 2018-03-26 | Stop reason: SDUPTHER

## 2018-03-19 RX ORDER — FOLIC ACID 1 MG/1
1 TABLET ORAL NIGHTLY
COMMUNITY

## 2018-03-19 RX ADMIN — FLUDEOXYGLUCOSE F 18 15.45 MILLICURIE: 200 INJECTION, SOLUTION INTRAVENOUS at 16:17

## 2018-03-19 ASSESSMENT — ENCOUNTER SYMPTOMS
SHORTNESS OF BREATH: 0
EYE PAIN: 0
COLOR CHANGE: 0
NAUSEA: 0
ABDOMINAL PAIN: 0
EYE REDNESS: 0
COUGH: 0
BACK PAIN: 0
VOMITING: 0
WHEEZING: 0

## 2018-03-20 LAB
CULTURE: NO GROWTH
CULTURE: NORMAL
Lab: NORMAL
SPECIMEN DESCRIPTION: NORMAL
STATUS: NORMAL

## 2018-03-20 RX ORDER — FLUDEOXYGLUCOSE F 18 200 MCI/ML
15.45 INJECTION, SOLUTION INTRAVENOUS
Status: COMPLETED | OUTPATIENT
Start: 2018-03-20 | End: 2018-03-19

## 2018-03-23 ENCOUNTER — HOSPITAL ENCOUNTER (OUTPATIENT)
Age: 83
Setting detail: OUTPATIENT SURGERY
Discharge: HOME | End: 2018-03-23
Attending: UROLOGY | Admitting: UROLOGY
Payer: MEDICARE

## 2018-03-23 ENCOUNTER — TELEPHONE (OUTPATIENT)
Dept: ONCOLOGY | Age: 83
End: 2018-03-23

## 2018-03-23 ENCOUNTER — ANESTHESIA (OUTPATIENT)
Dept: OPERATING ROOM | Age: 83
End: 2018-03-23
Payer: MEDICARE

## 2018-03-23 ENCOUNTER — ANESTHESIA EVENT (OUTPATIENT)
Dept: OPERATING ROOM | Age: 83
End: 2018-03-23
Payer: MEDICARE

## 2018-03-23 VITALS — SYSTOLIC BLOOD PRESSURE: 122 MMHG | TEMPERATURE: 95.9 F | DIASTOLIC BLOOD PRESSURE: 60 MMHG | OXYGEN SATURATION: 100 %

## 2018-03-23 VITALS
OXYGEN SATURATION: 98 % | TEMPERATURE: 97.9 F | DIASTOLIC BLOOD PRESSURE: 71 MMHG | HEART RATE: 73 BPM | RESPIRATION RATE: 16 BRPM | BODY MASS INDEX: 24.52 KG/M2 | SYSTOLIC BLOOD PRESSURE: 177 MMHG | WEIGHT: 185 LBS | HEIGHT: 73 IN

## 2018-03-23 LAB
GLUCOSE BLD-MCNC: 122 MG/DL (ref 74–100)
POC POTASSIUM: 4.2 MMOL/L (ref 3.5–4.5)

## 2018-03-23 PROCEDURE — 82947 ASSAY GLUCOSE BLOOD QUANT: CPT

## 2018-03-23 PROCEDURE — 7100000041 HC SPAR PHASE II RECOVERY - ADDTL 15 MIN: Performed by: UROLOGY

## 2018-03-23 PROCEDURE — 88307 TISSUE EXAM BY PATHOLOGIST: CPT

## 2018-03-23 PROCEDURE — 6360000002 HC RX W HCPCS: Performed by: NURSE ANESTHETIST, CERTIFIED REGISTERED

## 2018-03-23 PROCEDURE — 2720000010 HC SURG SUPPLY STERILE: Performed by: UROLOGY

## 2018-03-23 PROCEDURE — 3700000000 HC ANESTHESIA ATTENDED CARE: Performed by: UROLOGY

## 2018-03-23 PROCEDURE — 84132 ASSAY OF SERUM POTASSIUM: CPT

## 2018-03-23 PROCEDURE — 7100000040 HC SPAR PHASE II RECOVERY - FIRST 15 MIN: Performed by: UROLOGY

## 2018-03-23 PROCEDURE — 2500000003 HC RX 250 WO HCPCS: Performed by: NURSE ANESTHETIST, CERTIFIED REGISTERED

## 2018-03-23 PROCEDURE — 7100000001 HC PACU RECOVERY - ADDTL 15 MIN: Performed by: UROLOGY

## 2018-03-23 PROCEDURE — 2580000003 HC RX 258: Performed by: UROLOGY

## 2018-03-23 PROCEDURE — 3600000004 HC SURGERY LEVEL 4 BASE: Performed by: UROLOGY

## 2018-03-23 PROCEDURE — 7100000000 HC PACU RECOVERY - FIRST 15 MIN: Performed by: UROLOGY

## 2018-03-23 PROCEDURE — 2580000003 HC RX 258: Performed by: ANESTHESIOLOGY

## 2018-03-23 PROCEDURE — 6360000002 HC RX W HCPCS: Performed by: ANESTHESIOLOGY

## 2018-03-23 PROCEDURE — 3600000014 HC SURGERY LEVEL 4 ADDTL 15MIN: Performed by: UROLOGY

## 2018-03-23 PROCEDURE — 3700000001 HC ADD 15 MINUTES (ANESTHESIA): Performed by: UROLOGY

## 2018-03-23 RX ORDER — LIDOCAINE HYDROCHLORIDE 10 MG/ML
INJECTION, SOLUTION INFILTRATION; PERINEURAL PRN
Status: DISCONTINUED | OUTPATIENT
Start: 2018-03-23 | End: 2018-03-23 | Stop reason: SDUPTHER

## 2018-03-23 RX ORDER — MEPERIDINE HYDROCHLORIDE 50 MG/ML
12.5 INJECTION INTRAMUSCULAR; INTRAVENOUS; SUBCUTANEOUS EVERY 5 MIN PRN
Status: DISCONTINUED | OUTPATIENT
Start: 2018-03-23 | End: 2018-03-23 | Stop reason: HOSPADM

## 2018-03-23 RX ORDER — GLYCOPYRROLATE 0.2 MG/ML
INJECTION INTRAMUSCULAR; INTRAVENOUS PRN
Status: DISCONTINUED | OUTPATIENT
Start: 2018-03-23 | End: 2018-03-23 | Stop reason: SDUPTHER

## 2018-03-23 RX ORDER — SODIUM CHLORIDE, SODIUM LACTATE, POTASSIUM CHLORIDE, CALCIUM CHLORIDE 600; 310; 30; 20 MG/100ML; MG/100ML; MG/100ML; MG/100ML
1000 INJECTION, SOLUTION INTRAVENOUS CONTINUOUS
Status: DISCONTINUED | OUTPATIENT
Start: 2018-03-23 | End: 2018-03-23 | Stop reason: HOSPADM

## 2018-03-23 RX ORDER — FENTANYL CITRATE 50 UG/ML
INJECTION, SOLUTION INTRAMUSCULAR; INTRAVENOUS PRN
Status: DISCONTINUED | OUTPATIENT
Start: 2018-03-23 | End: 2018-03-23 | Stop reason: SDUPTHER

## 2018-03-23 RX ORDER — ONDANSETRON 2 MG/ML
INJECTION INTRAMUSCULAR; INTRAVENOUS PRN
Status: DISCONTINUED | OUTPATIENT
Start: 2018-03-23 | End: 2018-03-23 | Stop reason: SDUPTHER

## 2018-03-23 RX ORDER — FENTANYL CITRATE 50 UG/ML
25 INJECTION, SOLUTION INTRAMUSCULAR; INTRAVENOUS EVERY 5 MIN PRN
Status: COMPLETED | OUTPATIENT
Start: 2018-03-23 | End: 2018-03-23

## 2018-03-23 RX ORDER — EPHEDRINE SULFATE 50 MG/ML
INJECTION, SOLUTION INTRAVENOUS PRN
Status: DISCONTINUED | OUTPATIENT
Start: 2018-03-23 | End: 2018-03-23 | Stop reason: SDUPTHER

## 2018-03-23 RX ORDER — PROPOFOL 10 MG/ML
INJECTION, EMULSION INTRAVENOUS PRN
Status: DISCONTINUED | OUTPATIENT
Start: 2018-03-23 | End: 2018-03-23 | Stop reason: SDUPTHER

## 2018-03-23 RX ORDER — MAGNESIUM HYDROXIDE 1200 MG/15ML
LIQUID ORAL CONTINUOUS PRN
Status: DISCONTINUED | OUTPATIENT
Start: 2018-03-23 | End: 2018-03-23 | Stop reason: HOSPADM

## 2018-03-23 RX ORDER — CIPROFLOXACIN 2 MG/ML
400 INJECTION, SOLUTION INTRAVENOUS
Status: DISCONTINUED | OUTPATIENT
Start: 2018-03-23 | End: 2018-03-23 | Stop reason: HOSPADM

## 2018-03-23 RX ADMIN — FENTANYL CITRATE 25 MCG: 50 INJECTION INTRAMUSCULAR; INTRAVENOUS at 11:43

## 2018-03-23 RX ADMIN — CEFAZOLIN SODIUM 2 G: 2 SOLUTION INTRAVENOUS at 11:42

## 2018-03-23 RX ADMIN — ONDANSETRON 4 MG: 2 INJECTION INTRAMUSCULAR; INTRAVENOUS at 12:33

## 2018-03-23 RX ADMIN — EPHEDRINE SULFATE 5 MG: 50 INJECTION, SOLUTION INTRAMUSCULAR; INTRAVENOUS; SUBCUTANEOUS at 11:39

## 2018-03-23 RX ADMIN — FENTANYL CITRATE 25 MCG: 50 INJECTION INTRAMUSCULAR; INTRAVENOUS at 12:50

## 2018-03-23 RX ADMIN — LIDOCAINE HYDROCHLORIDE 40 MG: 10 INJECTION, SOLUTION INFILTRATION; PERINEURAL at 11:26

## 2018-03-23 RX ADMIN — EPHEDRINE SULFATE 5 MG: 50 INJECTION, SOLUTION INTRAMUSCULAR; INTRAVENOUS; SUBCUTANEOUS at 11:48

## 2018-03-23 RX ADMIN — GLYCOPYRROLATE 0.2 MG: 0.2 INJECTION INTRAMUSCULAR; INTRAVENOUS at 11:31

## 2018-03-23 RX ADMIN — PROPOFOL 160 MG: 10 INJECTION, EMULSION INTRAVENOUS at 11:26

## 2018-03-23 RX ADMIN — FENTANYL CITRATE 25 MCG: 50 INJECTION INTRAMUSCULAR; INTRAVENOUS at 11:55

## 2018-03-23 RX ADMIN — FENTANYL CITRATE 25 MCG: 50 INJECTION INTRAMUSCULAR; INTRAVENOUS at 13:15

## 2018-03-23 RX ADMIN — EPHEDRINE SULFATE 5 MG: 50 INJECTION, SOLUTION INTRAMUSCULAR; INTRAVENOUS; SUBCUTANEOUS at 11:33

## 2018-03-23 RX ADMIN — FENTANYL CITRATE 25 MCG: 50 INJECTION INTRAMUSCULAR; INTRAVENOUS at 12:56

## 2018-03-23 RX ADMIN — EPHEDRINE SULFATE 5 MG: 50 INJECTION, SOLUTION INTRAMUSCULAR; INTRAVENOUS; SUBCUTANEOUS at 12:07

## 2018-03-23 RX ADMIN — EPHEDRINE SULFATE 5 MG: 50 INJECTION, SOLUTION INTRAMUSCULAR; INTRAVENOUS; SUBCUTANEOUS at 11:42

## 2018-03-23 RX ADMIN — FENTANYL CITRATE 25 MCG: 50 INJECTION INTRAMUSCULAR; INTRAVENOUS at 13:01

## 2018-03-23 RX ADMIN — SODIUM CHLORIDE, POTASSIUM CHLORIDE, SODIUM LACTATE AND CALCIUM CHLORIDE 1000 ML: 600; 310; 30; 20 INJECTION, SOLUTION INTRAVENOUS at 10:20

## 2018-03-23 RX ADMIN — FENTANYL CITRATE 50 MCG: 50 INJECTION INTRAMUSCULAR; INTRAVENOUS at 11:26

## 2018-03-23 RX ADMIN — EPHEDRINE SULFATE 5 MG: 50 INJECTION, SOLUTION INTRAMUSCULAR; INTRAVENOUS; SUBCUTANEOUS at 12:17

## 2018-03-23 ASSESSMENT — PULMONARY FUNCTION TESTS
PIF_VALUE: 10
PIF_VALUE: 3
PIF_VALUE: 8
PIF_VALUE: 10
PIF_VALUE: 7
PIF_VALUE: 11
PIF_VALUE: 10
PIF_VALUE: 8
PIF_VALUE: 8
PIF_VALUE: 11
PIF_VALUE: 8
PIF_VALUE: 8
PIF_VALUE: 10
PIF_VALUE: 3
PIF_VALUE: 10
PIF_VALUE: 10
PIF_VALUE: 8
PIF_VALUE: 3
PIF_VALUE: 11
PIF_VALUE: 8
PIF_VALUE: 9
PIF_VALUE: 11
PIF_VALUE: 8
PIF_VALUE: 11
PIF_VALUE: 11
PIF_VALUE: 10
PIF_VALUE: 8
PIF_VALUE: 10
PIF_VALUE: 10
PIF_VALUE: 8
PIF_VALUE: 12
PIF_VALUE: 8
PIF_VALUE: 8
PIF_VALUE: 1
PIF_VALUE: 9
PIF_VALUE: 8
PIF_VALUE: 3
PIF_VALUE: 10
PIF_VALUE: 1
PIF_VALUE: 3
PIF_VALUE: 2
PIF_VALUE: 4
PIF_VALUE: 8
PIF_VALUE: 10
PIF_VALUE: 8
PIF_VALUE: 10
PIF_VALUE: 1
PIF_VALUE: 8
PIF_VALUE: 10
PIF_VALUE: 8
PIF_VALUE: 8
PIF_VALUE: 1
PIF_VALUE: 11
PIF_VALUE: 10
PIF_VALUE: 8
PIF_VALUE: 10
PIF_VALUE: 8
PIF_VALUE: 11
PIF_VALUE: 9
PIF_VALUE: 10
PIF_VALUE: 8
PIF_VALUE: 11

## 2018-03-23 ASSESSMENT — PAIN SCALES - GENERAL
PAINLEVEL_OUTOF10: 2
PAINLEVEL_OUTOF10: 2
PAINLEVEL_OUTOF10: 6
PAINLEVEL_OUTOF10: 6
PAINLEVEL_OUTOF10: 4
PAINLEVEL_OUTOF10: 6
PAINLEVEL_OUTOF10: 4
PAINLEVEL_OUTOF10: 2
PAINLEVEL_OUTOF10: 2
PAINLEVEL_OUTOF10: 6

## 2018-03-23 ASSESSMENT — PAIN DESCRIPTION - PAIN TYPE: TYPE: SURGICAL PAIN

## 2018-03-23 ASSESSMENT — PAIN DESCRIPTION - PROGRESSION: CLINICAL_PROGRESSION: GRADUALLY WORSENING

## 2018-03-23 ASSESSMENT — PAIN DESCRIPTION - ORIENTATION: ORIENTATION: MID;LOWER

## 2018-03-23 ASSESSMENT — PAIN - FUNCTIONAL ASSESSMENT: PAIN_FUNCTIONAL_ASSESSMENT: 0-10

## 2018-03-23 ASSESSMENT — PAIN DESCRIPTION - FREQUENCY: FREQUENCY: INTERMITTENT

## 2018-03-23 ASSESSMENT — PAIN DESCRIPTION - DESCRIPTORS: DESCRIPTORS: BURNING

## 2018-03-23 ASSESSMENT — PAIN DESCRIPTION - ONSET: ONSET: AWAKENED FROM SLEEP

## 2018-03-23 ASSESSMENT — PAIN DESCRIPTION - LOCATION: LOCATION: PENIS

## 2018-03-23 NOTE — H&P (VIEW-ONLY)
Narrative    No narrative on file        Service:Yes, [employer]          Hobbies:  Travel , great grandfather , vance    OBJECTIVE:   VITALS:  height is 6' 1\" (1.854 m) and weight is 185 lb (83.9 kg). His oral temperature is 98 °F (36.7 °C). His blood pressure is 153/63 (abnormal) and his pulse is 54. His respiration is 16 and oxygen saturation is 97%. CONSTITUTIONAL: Alert and oriented times 3, no acute distress and cooperative to examination. friendly and pleasant     SKIN: rash No    HEENT: Head is normocephalic, atraumatic. EOMI, PERRLA    Oral air way :slightly narrow Yes    NECK: neck supple, no lymphadenopathy noted, trachea midline and straight       2+ carotid, no bruit    LUNGS: Chest expands equally bilaterally upon respiration, no accessory muscle used. Ausculation reveals no adventitious breath sounds. CARDIOVASCULAR: \"Heart sounds are normal.  Regular rate and rhythm without murmur,    ABDOMEN: Bowel sounds are present in all four quadrants      GENATALIA:Deferred. NEUROLOGIC: \"CN II-XII are grossly intact. EXTREMITIES: Pitting edema:  No,  Varicose veins: No     Dorsal pedal/posterior tibial pulses palpable: Yes         Strength:  Normal       Patient Active Problem List   Diagnosis    Type 2 diabetes mellitus with neurologic complication (HCC)    Diabetic polyneuropathy (HCC)    Disorder of arteries and arterioles (HCC)    Memory loss    Tongue cancer (HCC)    Anorexia    Weight loss    Anxiety    Rash    Oral mucositis    Bradycardia    Chronic combined systolic and diastolic congestive heart failure (HCC)    History of tobacco abuse    Rheumatoid arteritis    Slow transit constipation    Orthostatic hypotension               IMPRESSIONS:   1. Bladder cancer   2.  does not have any pertinent problems on file.     Arlene [facility]  Electronically signed 3/19/2018 at 11:39 AM       Scheduled for: cysto, TUR-B tumor with gryus

## 2018-03-23 NOTE — TELEPHONE ENCOUNTER
Patient's wife called back after receiving a message from me to remind them of appointment on 3/26/18 with Dr. Stephan Mcmanus. She left message stating that her  had a biopsy today and the surgeon advised her to reschedule his appointment with us for a week later so that we can have the pathology report. I called her back and left message to call back on Monday to reschedule and any one that answers can assist her with scheduling.

## 2018-03-26 LAB — SURGICAL PATHOLOGY REPORT: NORMAL

## 2018-03-26 RX ORDER — TAMSULOSIN HYDROCHLORIDE 0.4 MG/1
0.4 CAPSULE ORAL
Qty: 30 CAPSULE | Refills: 3 | Status: SHIPPED | OUTPATIENT
Start: 2018-03-26 | End: 2018-08-19 | Stop reason: SDUPTHER

## 2018-04-03 ENCOUNTER — TELEPHONE (OUTPATIENT)
Dept: INTERNAL MEDICINE CLINIC | Age: 83
End: 2018-04-03

## 2018-04-06 ENCOUNTER — OFFICE VISIT (OUTPATIENT)
Dept: ONCOLOGY | Age: 83
End: 2018-04-06
Payer: MEDICARE

## 2018-04-06 ENCOUNTER — OFFICE VISIT (OUTPATIENT)
Dept: INTERNAL MEDICINE CLINIC | Age: 83
End: 2018-04-06
Payer: MEDICARE

## 2018-04-06 ENCOUNTER — HOSPITAL ENCOUNTER (OUTPATIENT)
Age: 83
Setting detail: SPECIMEN
Discharge: HOME | End: 2018-04-06
Payer: MEDICARE

## 2018-04-06 VITALS
SYSTOLIC BLOOD PRESSURE: 127 MMHG | WEIGHT: 184 LBS | DIASTOLIC BLOOD PRESSURE: 60 MMHG | RESPIRATION RATE: 18 BRPM | BODY MASS INDEX: 24.28 KG/M2 | TEMPERATURE: 98.4 F | HEART RATE: 64 BPM

## 2018-04-06 VITALS
WEIGHT: 182 LBS | BODY MASS INDEX: 24.12 KG/M2 | HEIGHT: 73 IN | SYSTOLIC BLOOD PRESSURE: 120 MMHG | DIASTOLIC BLOOD PRESSURE: 58 MMHG

## 2018-04-06 DIAGNOSIS — E11.49 DIABETIC NEUROPATHY WITH NEUROLOGIC COMPLICATION (HCC): ICD-10-CM

## 2018-04-06 DIAGNOSIS — C67.1 MALIGNANT NEOPLASM OF DOME OF URINARY BLADDER (HCC): ICD-10-CM

## 2018-04-06 DIAGNOSIS — E11.40 TYPE 2 DIABETES MELLITUS WITH DIABETIC NEUROPATHY, WITH LONG-TERM CURRENT USE OF INSULIN (HCC): ICD-10-CM

## 2018-04-06 DIAGNOSIS — E11.40 DIABETIC NEUROPATHY WITH NEUROLOGIC COMPLICATION (HCC): ICD-10-CM

## 2018-04-06 DIAGNOSIS — I50.42 CHRONIC COMBINED SYSTOLIC AND DIASTOLIC HEART FAILURE (HCC): ICD-10-CM

## 2018-04-06 DIAGNOSIS — R41.3 MEMORY LOSS: ICD-10-CM

## 2018-04-06 DIAGNOSIS — E11.40 TYPE 2 DIABETES MELLITUS WITH DIABETIC NEUROPATHY, WITH LONG-TERM CURRENT USE OF INSULIN (HCC): Primary | ICD-10-CM

## 2018-04-06 DIAGNOSIS — Z79.4 TYPE 2 DIABETES MELLITUS WITH DIABETIC NEUROPATHY, WITH LONG-TERM CURRENT USE OF INSULIN (HCC): Primary | ICD-10-CM

## 2018-04-06 DIAGNOSIS — C02.9 TONGUE CANCER (HCC): ICD-10-CM

## 2018-04-06 DIAGNOSIS — E11.42 DIABETIC POLYNEUROPATHY ASSOCIATED WITH TYPE 2 DIABETES MELLITUS (HCC): ICD-10-CM

## 2018-04-06 DIAGNOSIS — Z79.4 TYPE 2 DIABETES MELLITUS WITH DIABETIC NEUROPATHY, WITH LONG-TERM CURRENT USE OF INSULIN (HCC): ICD-10-CM

## 2018-04-06 DIAGNOSIS — C67.1 MALIGNANT NEOPLASM OF DOME OF URINARY BLADDER (HCC): Primary | ICD-10-CM

## 2018-04-06 LAB
CREATININE URINE: 96.8 MG/DL (ref 39–259)
ESTIMATED AVERAGE GLUCOSE: 131 MG/DL
HBA1C MFR BLD: 6.2 % (ref 4–6)
MICROALBUMIN/CREAT 24H UR: 465 MG/L
MICROALBUMIN/CREAT UR-RTO: 480 MCG/MG CREAT

## 2018-04-06 PROCEDURE — 1123F ACP DISCUSS/DSCN MKR DOCD: CPT | Performed by: INTERNAL MEDICINE

## 2018-04-06 PROCEDURE — G8427 DOCREV CUR MEDS BY ELIG CLIN: HCPCS | Performed by: INTERNAL MEDICINE

## 2018-04-06 PROCEDURE — 4040F PNEUMOC VAC/ADMIN/RCVD: CPT | Performed by: INTERNAL MEDICINE

## 2018-04-06 PROCEDURE — 1036F TOBACCO NON-USER: CPT | Performed by: INTERNAL MEDICINE

## 2018-04-06 PROCEDURE — 99214 OFFICE O/P EST MOD 30 MIN: CPT | Performed by: INTERNAL MEDICINE

## 2018-04-06 PROCEDURE — 99211 OFF/OP EST MAY X REQ PHY/QHP: CPT

## 2018-04-06 PROCEDURE — G8420 CALC BMI NORM PARAMETERS: HCPCS | Performed by: INTERNAL MEDICINE

## 2018-04-10 ENCOUNTER — TELEPHONE (OUTPATIENT)
Dept: INTERNAL MEDICINE CLINIC | Age: 83
End: 2018-04-10

## 2018-04-25 ENCOUNTER — PROCEDURE VISIT (OUTPATIENT)
Dept: UROLOGY | Age: 83
End: 2018-04-25
Payer: MEDICARE

## 2018-04-25 VITALS
BODY MASS INDEX: 24.09 KG/M2 | HEIGHT: 73 IN | DIASTOLIC BLOOD PRESSURE: 85 MMHG | WEIGHT: 181.8 LBS | SYSTOLIC BLOOD PRESSURE: 191 MMHG | HEART RATE: 55 BPM | TEMPERATURE: 98.4 F

## 2018-04-25 DIAGNOSIS — R30.0 DYSURIA: Primary | ICD-10-CM

## 2018-04-25 DIAGNOSIS — C67.1 MALIGNANT NEOPLASM OF DOME OF URINARY BLADDER (HCC): ICD-10-CM

## 2018-04-25 LAB
BILIRUBIN, POC: NORMAL
BLOOD URINE, POC: NORMAL
CLARITY, POC: CLEAR
COLOR, POC: YELLOW
GLUCOSE URINE, POC: NORMAL
KETONES, POC: NORMAL
LEUKOCYTE EST, POC: NORMAL
NITRITE, POC: NORMAL
PH, POC: NORMAL
PROTEIN, POC: NORMAL
SPECIFIC GRAVITY, POC: NORMAL
UROBILINOGEN, POC: NORMAL

## 2018-04-25 PROCEDURE — 51720 TREATMENT OF BLADDER LESION: CPT | Performed by: NURSE PRACTITIONER

## 2018-04-25 PROCEDURE — 99213 OFFICE O/P EST LOW 20 MIN: CPT | Performed by: NURSE PRACTITIONER

## 2018-04-25 PROCEDURE — 81002 URINALYSIS NONAUTO W/O SCOPE: CPT | Performed by: NURSE PRACTITIONER

## 2018-04-25 RX ORDER — INSULIN GLARGINE 100 [IU]/ML
5 INJECTION, SOLUTION SUBCUTANEOUS NIGHTLY
COMMUNITY
End: 2018-12-18

## 2018-05-10 ENCOUNTER — HOSPITAL ENCOUNTER (OUTPATIENT)
Age: 83
Setting detail: SPECIMEN
Discharge: HOME | End: 2018-05-10
Payer: MEDICARE

## 2018-05-10 ENCOUNTER — PROCEDURE VISIT (OUTPATIENT)
Dept: UROLOGY | Age: 83
End: 2018-05-10
Payer: MEDICARE

## 2018-05-10 VITALS
HEART RATE: 67 BPM | WEIGHT: 176 LBS | RESPIRATION RATE: 16 BRPM | DIASTOLIC BLOOD PRESSURE: 78 MMHG | TEMPERATURE: 98.1 F | SYSTOLIC BLOOD PRESSURE: 148 MMHG | BODY MASS INDEX: 23.33 KG/M2 | HEIGHT: 73 IN

## 2018-05-10 DIAGNOSIS — R31.0 GROSS HEMATURIA: ICD-10-CM

## 2018-05-10 DIAGNOSIS — C67.1 MALIGNANT NEOPLASM OF DOME OF URINARY BLADDER (HCC): Primary | ICD-10-CM

## 2018-05-10 LAB
-: NORMAL
AMORPHOUS: NORMAL
BACTERIA: NORMAL
BILIRUBIN URINE: NEGATIVE
BILIRUBIN, POC: ABNORMAL
BLOOD URINE, POC: ABNORMAL
CASTS UA: NORMAL /LPF (ref 0–2)
CLARITY, POC: CLEAR
COLOR, POC: YELLOW
COLOR: ABNORMAL
COMMENT UA: ABNORMAL
CRYSTALS, UA: NORMAL /HPF
EPITHELIAL CELLS UA: NORMAL /HPF (ref 0–5)
GLUCOSE URINE, POC: ABNORMAL
GLUCOSE URINE: ABNORMAL
KETONES, POC: ABNORMAL
KETONES, URINE: ABNORMAL
LEUKOCYTE EST, POC: ABNORMAL
LEUKOCYTE ESTERASE, URINE: ABNORMAL
MUCUS: NORMAL
NITRITE, POC: ABNORMAL
NITRITE, URINE: POSITIVE
OTHER OBSERVATIONS UA: NORMAL
PH UA: 7 (ref 5–8)
PH, POC: ABNORMAL
PROTEIN UA: ABNORMAL
PROTEIN, POC: ABNORMAL
RBC UA: NORMAL /HPF (ref 0–2)
RENAL EPITHELIAL, UA: NORMAL /HPF
SPECIFIC GRAVITY UA: 1.01 (ref 1–1.03)
SPECIFIC GRAVITY, POC: ABNORMAL
TRICHOMONAS: NORMAL
TURBIDITY: ABNORMAL
URINE HGB: ABNORMAL
UROBILINOGEN, POC: ABNORMAL
UROBILINOGEN, URINE: ABNORMAL
WBC UA: NORMAL /HPF (ref 0–5)
YEAST: NORMAL

## 2018-05-10 PROCEDURE — 99999 PR OFFICE/OUTPT VISIT,PROCEDURE ONLY: CPT | Performed by: NURSE PRACTITIONER

## 2018-05-10 PROCEDURE — 81003 URINALYSIS AUTO W/O SCOPE: CPT | Performed by: NURSE PRACTITIONER

## 2018-05-11 LAB
CULTURE: NO GROWTH
CULTURE: NORMAL
Lab: NORMAL
SPECIMEN DESCRIPTION: NORMAL
STATUS: NORMAL

## 2018-05-17 ENCOUNTER — TELEPHONE (OUTPATIENT)
Dept: INTERNAL MEDICINE CLINIC | Age: 83
End: 2018-05-17

## 2018-05-17 ENCOUNTER — PROCEDURE VISIT (OUTPATIENT)
Dept: UROLOGY | Age: 83
End: 2018-05-17
Payer: MEDICARE

## 2018-05-17 VITALS — DIASTOLIC BLOOD PRESSURE: 91 MMHG | SYSTOLIC BLOOD PRESSURE: 165 MMHG | TEMPERATURE: 98.5 F | HEART RATE: 58 BPM

## 2018-05-17 DIAGNOSIS — C67.1 MALIGNANT NEOPLASM OF DOME OF URINARY BLADDER (HCC): Primary | ICD-10-CM

## 2018-05-17 LAB
BILIRUBIN, POC: ABNORMAL
BLOOD URINE, POC: ABNORMAL
CLARITY, POC: CLEAR
COLOR, POC: YELLOW
GLUCOSE URINE, POC: ABNORMAL
KETONES, POC: ABNORMAL
LEUKOCYTE EST, POC: ABNORMAL
NITRITE, POC: ABNORMAL
PH, POC: ABNORMAL
PROTEIN, POC: ABNORMAL
SPECIFIC GRAVITY, POC: ABNORMAL
UROBILINOGEN, POC: ABNORMAL

## 2018-05-17 PROCEDURE — 81003 URINALYSIS AUTO W/O SCOPE: CPT | Performed by: NURSE PRACTITIONER

## 2018-05-17 PROCEDURE — 99999 PR OFFICE/OUTPT VISIT,PROCEDURE ONLY: CPT | Performed by: NURSE PRACTITIONER

## 2018-05-18 ENCOUNTER — HOSPITAL ENCOUNTER (OUTPATIENT)
Age: 83
Setting detail: SPECIMEN
Discharge: HOME | End: 2018-05-18
Payer: MEDICARE

## 2018-05-18 ENCOUNTER — TELEPHONE (OUTPATIENT)
Dept: UROLOGY | Age: 83
End: 2018-05-18

## 2018-05-18 LAB
ABSOLUTE EOS #: 0.2 K/UL (ref 0–0.44)
ABSOLUTE IMMATURE GRANULOCYTE: 0.03 K/UL (ref 0–0.3)
ABSOLUTE LYMPH #: 1.01 K/UL (ref 1.1–3.7)
ABSOLUTE MONO #: 0.92 K/UL (ref 0.1–1.2)
ALBUMIN SERPL-MCNC: 4.1 G/DL (ref 3.5–5.2)
ALT SERPL-CCNC: 13 U/L (ref 5–41)
BASOPHILS # BLD: 1 % (ref 0–2)
BASOPHILS ABSOLUTE: 0.08 K/UL (ref 0–0.2)
C-REACTIVE PROTEIN: 1 MG/L (ref 0–5)
CREAT SERPL-MCNC: 0.99 MG/DL (ref 0.7–1.2)
DIFFERENTIAL TYPE: ABNORMAL
EOSINOPHILS RELATIVE PERCENT: 3 % (ref 1–4)
GFR AFRICAN AMERICAN: >60 ML/MIN
GFR NON-AFRICAN AMERICAN: >60 ML/MIN
GFR SERPL CREATININE-BSD FRML MDRD: NORMAL ML/MIN/{1.73_M2}
GFR SERPL CREATININE-BSD FRML MDRD: NORMAL ML/MIN/{1.73_M2}
HCT VFR BLD CALC: 37.9 % (ref 40.7–50.3)
HEMOGLOBIN: 11.3 G/DL (ref 13–17)
IMMATURE GRANULOCYTES: 0 %
LYMPHOCYTES # BLD: 14 % (ref 24–43)
MCH RBC QN AUTO: 28 PG (ref 25.2–33.5)
MCHC RBC AUTO-ENTMCNC: 29.8 G/DL (ref 28.4–34.8)
MCV RBC AUTO: 94 FL (ref 82.6–102.9)
MONOCYTES # BLD: 13 % (ref 3–12)
NRBC AUTOMATED: 0 PER 100 WBC
PDW BLD-RTO: 15.9 % (ref 11.8–14.4)
PLATELET # BLD: ABNORMAL K/UL (ref 138–453)
PLATELET ESTIMATE: ABNORMAL
PLATELET, FLUORESCENCE: 270 K/UL (ref 138–453)
PLATELET, IMMATURE FRACTION: 9.1 % (ref 1.1–10.3)
PMV BLD AUTO: ABNORMAL FL (ref 8.1–13.5)
RBC # BLD: 4.03 M/UL (ref 4.21–5.77)
RBC # BLD: ABNORMAL 10*6/UL
SEDIMENTATION RATE, ERYTHROCYTE: 10 MM (ref 0–10)
SEG NEUTROPHILS: 69 % (ref 36–65)
SEGMENTED NEUTROPHILS ABSOLUTE COUNT: 5 K/UL (ref 1.5–8.1)
WBC # BLD: 7.2 K/UL (ref 3.5–11.3)
WBC # BLD: ABNORMAL 10*3/UL

## 2018-06-25 ENCOUNTER — PROCEDURE VISIT (OUTPATIENT)
Dept: UROLOGY | Age: 83
End: 2018-06-25
Payer: MEDICARE

## 2018-06-25 ENCOUNTER — HOSPITAL ENCOUNTER (OUTPATIENT)
Age: 83
Setting detail: SPECIMEN
Discharge: HOME | End: 2018-06-25
Payer: MEDICARE

## 2018-06-25 VITALS — DIASTOLIC BLOOD PRESSURE: 93 MMHG | TEMPERATURE: 98.2 F | SYSTOLIC BLOOD PRESSURE: 154 MMHG | HEART RATE: 68 BPM

## 2018-06-25 DIAGNOSIS — C67.4 MALIGNANT NEOPLASM OF POSTERIOR WALL OF URINARY BLADDER (HCC): Primary | ICD-10-CM

## 2018-06-25 DIAGNOSIS — R30.0 DYSURIA: ICD-10-CM

## 2018-06-25 PROCEDURE — 99999 PR OFFICE/OUTPT VISIT,PROCEDURE ONLY: CPT | Performed by: UROLOGY

## 2018-06-25 PROCEDURE — 52000 CYSTOURETHROSCOPY: CPT | Performed by: UROLOGY

## 2018-06-26 LAB
CULTURE: NO GROWTH
Lab: NORMAL
SPECIMEN DESCRIPTION: NORMAL
STATUS: NORMAL

## 2018-06-28 ENCOUNTER — TELEPHONE (OUTPATIENT)
Dept: INTERNAL MEDICINE CLINIC | Age: 83
End: 2018-06-28

## 2018-07-02 ENCOUNTER — HOSPITAL ENCOUNTER (OUTPATIENT)
Age: 83
Setting detail: SPECIMEN
Discharge: HOME | End: 2018-07-02
Payer: MEDICARE

## 2018-07-02 LAB
ANION GAP SERPL CALCULATED.3IONS-SCNC: 10 MMOL/L (ref 9–17)
BUN BLDV-MCNC: 26 MG/DL (ref 8–23)
BUN/CREAT BLD: ABNORMAL (ref 9–20)
CALCIUM SERPL-MCNC: 9.1 MG/DL (ref 8.6–10.4)
CHLORIDE BLD-SCNC: 103 MMOL/L (ref 98–107)
CO2: 27 MMOL/L (ref 20–31)
CREAT SERPL-MCNC: 0.83 MG/DL (ref 0.7–1.2)
GFR AFRICAN AMERICAN: >60 ML/MIN
GFR NON-AFRICAN AMERICAN: >60 ML/MIN
GFR SERPL CREATININE-BSD FRML MDRD: ABNORMAL ML/MIN/{1.73_M2}
GFR SERPL CREATININE-BSD FRML MDRD: ABNORMAL ML/MIN/{1.73_M2}
GLUCOSE BLD-MCNC: 119 MG/DL (ref 70–99)
HCT VFR BLD CALC: 36.8 % (ref 40.7–50.3)
HEMOGLOBIN: 11.3 G/DL (ref 13–17)
MCH RBC QN AUTO: 28 PG (ref 25.2–33.5)
MCHC RBC AUTO-ENTMCNC: 30.7 G/DL (ref 28.4–34.8)
MCV RBC AUTO: 91.1 FL (ref 82.6–102.9)
NRBC AUTOMATED: 0 PER 100 WBC
PDW BLD-RTO: 17.9 % (ref 11.8–14.4)
PLATELET # BLD: ABNORMAL K/UL (ref 138–453)
PLATELET, FLUORESCENCE: 259 K/UL (ref 138–453)
PLATELET, IMMATURE FRACTION: 7.1 % (ref 1.1–10.3)
PMV BLD AUTO: ABNORMAL FL (ref 8.1–13.5)
POTASSIUM SERPL-SCNC: 4.6 MMOL/L (ref 3.7–5.3)
RBC # BLD: 4.04 M/UL (ref 4.21–5.77)
SODIUM BLD-SCNC: 140 MMOL/L (ref 135–144)
WBC # BLD: 8.7 K/UL (ref 3.5–11.3)

## 2018-08-17 DIAGNOSIS — E11.9 INSULIN DEPENDENT TYPE 2 DIABETES MELLITUS, CONTROLLED (HCC): ICD-10-CM

## 2018-08-17 DIAGNOSIS — Z79.4 TYPE 2 DIABETES MELLITUS WITH DIABETIC POLYNEUROPATHY, WITH LONG-TERM CURRENT USE OF INSULIN (HCC): ICD-10-CM

## 2018-08-17 DIAGNOSIS — E11.42 TYPE 2 DIABETES MELLITUS WITH DIABETIC POLYNEUROPATHY, WITH LONG-TERM CURRENT USE OF INSULIN (HCC): ICD-10-CM

## 2018-08-17 DIAGNOSIS — Z79.4 INSULIN DEPENDENT TYPE 2 DIABETES MELLITUS, CONTROLLED (HCC): ICD-10-CM

## 2018-08-20 RX ORDER — TAMSULOSIN HYDROCHLORIDE 0.4 MG/1
CAPSULE ORAL
Qty: 90 CAPSULE | Refills: 3 | Status: SHIPPED | OUTPATIENT
Start: 2018-08-20 | End: 2019-08-14

## 2018-08-21 DIAGNOSIS — Z79.4 TYPE 2 DIABETES MELLITUS WITH DIABETIC POLYNEUROPATHY, WITH LONG-TERM CURRENT USE OF INSULIN (HCC): ICD-10-CM

## 2018-08-21 DIAGNOSIS — Z79.4 INSULIN DEPENDENT TYPE 2 DIABETES MELLITUS, CONTROLLED (HCC): ICD-10-CM

## 2018-08-21 DIAGNOSIS — E11.42 TYPE 2 DIABETES MELLITUS WITH DIABETIC POLYNEUROPATHY, WITH LONG-TERM CURRENT USE OF INSULIN (HCC): ICD-10-CM

## 2018-08-21 DIAGNOSIS — E11.9 INSULIN DEPENDENT TYPE 2 DIABETES MELLITUS, CONTROLLED (HCC): ICD-10-CM

## 2018-08-23 NOTE — TELEPHONE ENCOUNTER
He said that these were supposed to go to [facility] on [address]. He is now completely out of test strips. I am not able to bring this up as a pharmacy. Can you help? ?

## 2018-10-02 RX ORDER — SIMVASTATIN 20 MG
TABLET ORAL
Qty: 90 TABLET | Refills: 3 | Status: SHIPPED | OUTPATIENT
Start: 2018-10-02 | End: 2019-08-20 | Stop reason: DRUGHIGH

## 2018-10-15 ENCOUNTER — PROCEDURE VISIT (OUTPATIENT)
Dept: UROLOGY | Age: 83
End: 2018-10-15
Payer: MEDICARE

## 2018-10-15 VITALS
DIASTOLIC BLOOD PRESSURE: 79 MMHG | HEIGHT: 72 IN | HEART RATE: 74 BPM | TEMPERATURE: 98 F | WEIGHT: 180 LBS | BODY MASS INDEX: 24.38 KG/M2 | SYSTOLIC BLOOD PRESSURE: 198 MMHG

## 2018-10-15 DIAGNOSIS — R31.0 GROSS HEMATURIA: Primary | ICD-10-CM

## 2018-10-15 DIAGNOSIS — C67.4 MALIGNANT NEOPLASM OF POSTERIOR WALL OF URINARY BLADDER (HCC): ICD-10-CM

## 2018-10-15 PROCEDURE — 99999 PR OFFICE/OUTPT VISIT,PROCEDURE ONLY: CPT | Performed by: UROLOGY

## 2018-10-15 PROCEDURE — 52000 CYSTOURETHROSCOPY: CPT | Performed by: UROLOGY

## 2018-10-15 NOTE — PROGRESS NOTES
Cystoscopy Operative Note (10/15/18)  Surgeon: Jennifer Roca MD     Anesthesia: Urethral 2% Xylocaine   Indications: bladder cancer  Position: Dorsal Lithotomy    Findings:   The patient was prepped and draped in the usual sterile fashion. The flexible cystoscope was advanced through the urethra and into the bladder. The bladder was thoroughly inspected and the following was noted:      Urethra: normal appearing urethra with no masses, tenderness or lesions  Prostate: partially obstructing lateral lobes of prostate;  Bladder: No tumors or CIS noted. No bladder diverticulum. There was none trabeculation noted. Ureters: Clear efflux from both ureters. Orifices with normal configuration and location. The cystoscope was removed. The patient tolerated the procedure well. Dilated distal urethra to [address] [place] with valarie sounds.     Plan: cysto in 3 mo

## 2018-10-22 ENCOUNTER — OFFICE VISIT (OUTPATIENT)
Dept: ONCOLOGY | Age: 83
End: 2018-10-22
Payer: MEDICARE

## 2018-10-22 VITALS
WEIGHT: 178.5 LBS | DIASTOLIC BLOOD PRESSURE: 64 MMHG | HEART RATE: 65 BPM | SYSTOLIC BLOOD PRESSURE: 132 MMHG | BODY MASS INDEX: 24.21 KG/M2 | TEMPERATURE: 98.5 F

## 2018-10-22 DIAGNOSIS — C02.9 TONGUE CANCER (HCC): ICD-10-CM

## 2018-10-22 DIAGNOSIS — C67.1 MALIGNANT NEOPLASM OF DOME OF URINARY BLADDER (HCC): Primary | ICD-10-CM

## 2018-10-22 PROCEDURE — 4040F PNEUMOC VAC/ADMIN/RCVD: CPT | Performed by: INTERNAL MEDICINE

## 2018-10-22 PROCEDURE — 1036F TOBACCO NON-USER: CPT | Performed by: INTERNAL MEDICINE

## 2018-10-22 PROCEDURE — 1101F PT FALLS ASSESS-DOCD LE1/YR: CPT | Performed by: INTERNAL MEDICINE

## 2018-10-22 PROCEDURE — G8420 CALC BMI NORM PARAMETERS: HCPCS | Performed by: INTERNAL MEDICINE

## 2018-10-22 PROCEDURE — G8427 DOCREV CUR MEDS BY ELIG CLIN: HCPCS | Performed by: INTERNAL MEDICINE

## 2018-10-22 PROCEDURE — 99214 OFFICE O/P EST MOD 30 MIN: CPT | Performed by: INTERNAL MEDICINE

## 2018-10-22 PROCEDURE — 99211 OFF/OP EST MAY X REQ PHY/QHP: CPT | Performed by: INTERNAL MEDICINE

## 2018-10-22 PROCEDURE — G8484 FLU IMMUNIZE NO ADMIN: HCPCS | Performed by: INTERNAL MEDICINE

## 2018-10-22 PROCEDURE — 1123F ACP DISCUSS/DSCN MKR DOCD: CPT | Performed by: INTERNAL MEDICINE

## 2018-10-22 RX ORDER — MULTIVIT-MIN/IRON/FOLIC ACID/K 18-600-40
CAPSULE ORAL
COMMUNITY
End: 2019-04-15

## 2018-10-22 RX ORDER — FERROUS SULFATE 325(65) MG
325 TABLET ORAL
Status: ON HOLD | COMMUNITY
End: 2019-02-22 | Stop reason: ALTCHOICE

## 2018-10-22 NOTE — PROGRESS NOTES
Rt cervical and submandibular LN 1 inch each. No axillary or inguinal lymph node enlargement. Neurologic:  Conscious and oriented. No focal neurological deficits. Psychosocial: No depression, anxiety or stress. Skin: no skin lesions. Review of Diagnostic data:   Tongue biopsy: squamous cell carcinoma. CT neck 10/9/14:  IMPRESSION:    1. Heterogeneously enhancing ulcerative partially necrotic mass in the    right tongue base/tonsillar fossa and invading the extrinsic muscles of    the tongue and extension across the midline lingual frenulum with likely    invasion of the lingual artery and nerve. 2. Level II conglomerate heterogeneously enhancing partially necrotic    lymph nodes with ill-defined borders and also fat planes with the    anterior belly of the sternocleidomastoid muscle consistent with invasion    and extracapsular spread. Heterogeneously enhancing borderline prominent    level III right-sided lymph node      CT neck 2/25/15:  IMPRESSION:    1. Significant interval decrease in size of the previously seen ulcerated    necrotic mass along the right aspect of the tongue base. Small residual    mucosal component however cannot be entirely excluded. Please consider a    direct visual inspection   2. Significant interval decrease in size of the right-sided level II    conglomerate of the cervical lymph nodes since previous examination.          Lab Results   Component Value Date    WBC 8.7 07/02/2018    HGB 11.3 (L) 07/02/2018    HCT 36.8 (L) 07/02/2018    MCV 91.1 07/02/2018    PLT See Reflexed IPF Result 07/02/2018       Chemistry        Component Value Date/Time     07/02/2018 1030    K 4.6 07/02/2018 1030     07/02/2018 1030    CO2 27 07/02/2018 1030    BUN 26 (H) 07/02/2018 1030    CREATININE 0.83 07/02/2018 1030        Component Value Date/Time    CALCIUM 9.1 07/02/2018 1030    ALKPHOS 80 02/16/2016 1405    AST 11 02/16/2016 1405    ALT 13 05/18/2018 1300    BILITOT 0.79

## 2018-11-17 ENCOUNTER — TELEPHONE (OUTPATIENT)
Dept: INTERNAL MEDICINE CLINIC | Age: 83
End: 2018-11-17

## 2018-11-26 ENCOUNTER — HOSPITAL ENCOUNTER (OUTPATIENT)
Age: 83
Setting detail: SPECIMEN
Discharge: HOME | End: 2018-11-26
Payer: MEDICARE

## 2018-11-26 ENCOUNTER — TELEPHONE (OUTPATIENT)
Dept: UROLOGY | Age: 83
End: 2018-11-26

## 2018-11-26 DIAGNOSIS — R35.0 URINARY FREQUENCY: ICD-10-CM

## 2018-11-26 DIAGNOSIS — R82.90 CLOUDY URINE: ICD-10-CM

## 2018-11-26 DIAGNOSIS — R30.0 DYSURIA: Primary | ICD-10-CM

## 2018-11-26 LAB
-: ABNORMAL
AMORPHOUS: ABNORMAL
BACTERIA: ABNORMAL
BILIRUBIN URINE: NEGATIVE
CASTS UA: ABNORMAL /LPF (ref 0–2)
COLOR: YELLOW
COMMENT UA: ABNORMAL
CRYSTALS, UA: ABNORMAL /HPF
EPITHELIAL CELLS UA: ABNORMAL /HPF (ref 0–5)
GLUCOSE URINE: ABNORMAL
KETONES, URINE: NEGATIVE
LEUKOCYTE ESTERASE, URINE: ABNORMAL
MUCUS: ABNORMAL
NITRITE, URINE: POSITIVE
OTHER OBSERVATIONS UA: ABNORMAL
PH UA: 6.5 (ref 5–8)
PROTEIN UA: ABNORMAL
RBC UA: ABNORMAL /HPF (ref 0–2)
RENAL EPITHELIAL, UA: ABNORMAL /HPF
SPECIFIC GRAVITY UA: 1.02 (ref 1–1.03)
TRICHOMONAS: ABNORMAL
TURBIDITY: ABNORMAL
URINE HGB: ABNORMAL
UROBILINOGEN, URINE: NORMAL
WBC UA: ABNORMAL /HPF (ref 0–5)
YEAST: ABNORMAL

## 2018-11-28 LAB
CULTURE: ABNORMAL
Lab: ABNORMAL
ORGANISM: ABNORMAL
SPECIMEN DESCRIPTION: ABNORMAL
STATUS: ABNORMAL

## 2018-11-29 DIAGNOSIS — N39.0 URINARY TRACT INFECTION WITHOUT HEMATURIA, SITE UNSPECIFIED: Primary | ICD-10-CM

## 2018-11-29 DIAGNOSIS — R31.9 URINARY TRACT INFECTION WITH HEMATURIA, SITE UNSPECIFIED: Primary | ICD-10-CM

## 2018-11-29 DIAGNOSIS — N39.0 URINARY TRACT INFECTION WITH HEMATURIA, SITE UNSPECIFIED: Primary | ICD-10-CM

## 2018-11-29 RX ORDER — LEVOFLOXACIN 500 MG/1
500 TABLET, FILM COATED ORAL DAILY
Qty: 7 TABLET | Refills: 0 | Status: SHIPPED | OUTPATIENT
Start: 2018-11-29 | End: 2018-12-06

## 2018-11-29 RX ORDER — SULFAMETHOXAZOLE AND TRIMETHOPRIM 800; 160 MG/1; MG/1
1 TABLET ORAL 2 TIMES DAILY
Qty: 14 TABLET | Refills: 0 | Status: SHIPPED | OUTPATIENT
Start: 2018-11-29 | End: 2018-12-06

## 2018-12-06 DIAGNOSIS — R35.0 FREQUENCY OF URINATION: Primary | ICD-10-CM

## 2018-12-06 DIAGNOSIS — N39.0 URINARY TRACT INFECTION WITHOUT HEMATURIA, SITE UNSPECIFIED: ICD-10-CM

## 2018-12-07 RX ORDER — LEVOFLOXACIN 500 MG/1
TABLET, FILM COATED ORAL
Qty: 7 TABLET | Refills: 0 | OUTPATIENT
Start: 2018-12-07

## 2018-12-10 ENCOUNTER — OFFICE VISIT (OUTPATIENT)
Dept: UROLOGY | Age: 83
End: 2018-12-10
Payer: MEDICARE

## 2018-12-10 VITALS
HEART RATE: 60 BPM | SYSTOLIC BLOOD PRESSURE: 138 MMHG | WEIGHT: 180.78 LBS | HEIGHT: 72 IN | BODY MASS INDEX: 24.49 KG/M2 | TEMPERATURE: 97.9 F | DIASTOLIC BLOOD PRESSURE: 68 MMHG

## 2018-12-10 DIAGNOSIS — N30.00 ACUTE CYSTITIS WITHOUT HEMATURIA: Primary | ICD-10-CM

## 2018-12-10 DIAGNOSIS — C67.4 MALIGNANT NEOPLASM OF POSTERIOR WALL OF URINARY BLADDER (HCC): ICD-10-CM

## 2018-12-10 PROCEDURE — 99214 OFFICE O/P EST MOD 30 MIN: CPT | Performed by: UROLOGY

## 2018-12-10 PROCEDURE — 1036F TOBACCO NON-USER: CPT | Performed by: UROLOGY

## 2018-12-10 PROCEDURE — G8420 CALC BMI NORM PARAMETERS: HCPCS | Performed by: UROLOGY

## 2018-12-10 PROCEDURE — 1101F PT FALLS ASSESS-DOCD LE1/YR: CPT | Performed by: UROLOGY

## 2018-12-10 PROCEDURE — 1123F ACP DISCUSS/DSCN MKR DOCD: CPT | Performed by: UROLOGY

## 2018-12-10 PROCEDURE — G8484 FLU IMMUNIZE NO ADMIN: HCPCS | Performed by: UROLOGY

## 2018-12-10 PROCEDURE — 4040F PNEUMOC VAC/ADMIN/RCVD: CPT | Performed by: UROLOGY

## 2018-12-10 PROCEDURE — G8427 DOCREV CUR MEDS BY ELIG CLIN: HCPCS | Performed by: UROLOGY

## 2018-12-10 ASSESSMENT — ENCOUNTER SYMPTOMS
EYE REDNESS: 0
COLOR CHANGE: 0
ABDOMINAL PAIN: 0
COUGH: 0
BACK PAIN: 0
VOMITING: 0
NAUSEA: 0
WHEEZING: 0
EYE PAIN: 0
CONSTIPATION: 1
SHORTNESS OF BREATH: 0

## 2018-12-10 NOTE — PROGRESS NOTES
Laterality Date    APPENDECTOMY  '70's    BRONCHOSCOPY      CHOLECYSTECTOMY  1981    CLAVICLE SURGERY Right 1949    pins placed and removed    COLONOSCOPY      several polypectomy    CYSTOSCOPY  03/23/2018    GASTROSTOMY TUBE PLACEMENT  11/25/2014    with EGD;  removed    [place] OFFICE/OUTPT VISIT,PROCEDURE ONLY N/A 3/23/2018    CYSTOSCOPY, TUR BLADDER TUMOR WITH GYRUS performed by Tiff Mckoy MD at [address] [facility]  11/2014    chemo Tx.     TONSILLECTOMY      TUNNELED VENOUS PORT PLACEMENT Left 11/17/14    chemo port insertion; left chest    TURP  86/80/3973    UMBILICAL HERNIA REPAIR      umbilical repair     Family History   Problem Relation Age of Onset    Heart Failure Father     Arthritis Father     No Known Problems Maternal Grandmother     No Known Problems Maternal Grandfather     No Known Problems Paternal Grandmother     No Known Problems Paternal Grandfather      Outpatient Prescriptions Marked as Taking for the 12/10/18 encounter (Office Visit) with Tiff Mckoy MD   Medication Sig Dispense Refill    Ascorbic Acid (VITAMIN C) 500 MG CHEW Take 500 mg by mouth daily      ferrous sulfate 325 (65 Fe) MG tablet Take 325 mg by mouth daily (with breakfast)      Cholecalciferol (VITAMIN D) 2000 units CAPS capsule Take by mouth      simvastatin (ZOCOR) 20 MG tablet TAKE ONE TABLET BY MOUTH ONCE DAILY 90 tablet 3    blood glucose test strips (TRUETEST TEST) strip 1 each by In Vitro route daily 100 each 3    tamsulosin (FLOMAX) 0.4 MG capsule TAKE 1 CAPSULE BY MOUTH ONCE DAILY WITH  SUPPER 90 capsule 3    methotrexate (RHEUMATREX) 2.5 MG chemo tablet TK 6 TS PO ONCE A WEEK  2    insulin glargine (LANTUS) 100 UNIT/ML injection vial Inject 5 Units into the skin nightly       aspirin 81 MG tablet Take 81 mg by mouth daily      folic acid (FOLVITE) 1 MG tablet Take 1 mg by mouth Daily with supper      Multiple Vitamins-Minerals (THERAPEUTIC MULTIVITAMIN-MINERALS) tablet Take 1 for cysto; pt already scheduled. Return for Next available appointment, Cystoscopy. Prescriptions Ordered:  No orders of the defined types were placed in this encounter. Orders Placed:  No orders of the defined types were placed in this encounter. Tena Everett MD    Agree with the ROS entered by the MA.

## 2018-12-18 ENCOUNTER — OFFICE VISIT (OUTPATIENT)
Dept: INTERNAL MEDICINE CLINIC | Age: 83
End: 2018-12-18
Payer: MEDICARE

## 2018-12-18 VITALS
WEIGHT: 186 LBS | DIASTOLIC BLOOD PRESSURE: 68 MMHG | SYSTOLIC BLOOD PRESSURE: 138 MMHG | BODY MASS INDEX: 25.19 KG/M2 | HEIGHT: 72 IN

## 2018-12-18 DIAGNOSIS — I50.42 CHRONIC COMBINED SYSTOLIC AND DIASTOLIC CONGESTIVE HEART FAILURE (HCC): ICD-10-CM

## 2018-12-18 DIAGNOSIS — K40.90 RIGHT INGUINAL HERNIA: Primary | ICD-10-CM

## 2018-12-18 DIAGNOSIS — E08.42 DIABETIC POLYNEUROPATHY ASSOCIATED WITH DIABETES MELLITUS DUE TO UNDERLYING CONDITION (HCC): ICD-10-CM

## 2018-12-18 DIAGNOSIS — I77.9 DISORDER OF ARTERIES AND ARTERIOLES (HCC): ICD-10-CM

## 2018-12-18 DIAGNOSIS — E11.40 TYPE 2 DIABETES MELLITUS WITH DIABETIC NEUROPATHY, UNSPECIFIED WHETHER LONG TERM INSULIN USE (HCC): ICD-10-CM

## 2018-12-18 DIAGNOSIS — K59.00 CONSTIPATION, UNSPECIFIED CONSTIPATION TYPE: ICD-10-CM

## 2018-12-18 DIAGNOSIS — F41.9 ANXIETY: ICD-10-CM

## 2018-12-18 PROCEDURE — G8419 CALC BMI OUT NRM PARAM NOF/U: HCPCS | Performed by: INTERNAL MEDICINE

## 2018-12-18 PROCEDURE — 1036F TOBACCO NON-USER: CPT | Performed by: INTERNAL MEDICINE

## 2018-12-18 PROCEDURE — 1123F ACP DISCUSS/DSCN MKR DOCD: CPT | Performed by: INTERNAL MEDICINE

## 2018-12-18 PROCEDURE — 1101F PT FALLS ASSESS-DOCD LE1/YR: CPT | Performed by: INTERNAL MEDICINE

## 2018-12-18 PROCEDURE — 4040F PNEUMOC VAC/ADMIN/RCVD: CPT | Performed by: INTERNAL MEDICINE

## 2018-12-18 PROCEDURE — 99214 OFFICE O/P EST MOD 30 MIN: CPT | Performed by: INTERNAL MEDICINE

## 2018-12-18 PROCEDURE — G8427 DOCREV CUR MEDS BY ELIG CLIN: HCPCS | Performed by: INTERNAL MEDICINE

## 2018-12-18 PROCEDURE — G8484 FLU IMMUNIZE NO ADMIN: HCPCS | Performed by: INTERNAL MEDICINE

## 2018-12-18 RX ORDER — GLIMEPIRIDE 2 MG/1
2 TABLET ORAL
Qty: 30 TABLET | Refills: 2 | Status: SHIPPED | OUTPATIENT
Start: 2018-12-18 | End: 2019-04-24 | Stop reason: SDUPTHER

## 2018-12-18 ASSESSMENT — PATIENT HEALTH QUESTIONNAIRE - PHQ9
1. LITTLE INTEREST OR PLEASURE IN DOING THINGS: 0
SUM OF ALL RESPONSES TO PHQ9 QUESTIONS 1 & 2: 0
2. FEELING DOWN, DEPRESSED OR HOPELESS: 0
SUM OF ALL RESPONSES TO PHQ QUESTIONS 1-9: 0
SUM OF ALL RESPONSES TO PHQ QUESTIONS 1-9: 0

## 2018-12-18 ASSESSMENT — ENCOUNTER SYMPTOMS
BLOOD IN STOOL: 0
COLOR CHANGE: 0
DIARRHEA: 0
EYE PAIN: 0
EYE DISCHARGE: 0
TROUBLE SWALLOWING: 0
SHORTNESS OF BREATH: 0
WHEEZING: 0
ABDOMINAL DISTENTION: 0
COUGH: 0

## 2018-12-18 NOTE — PROGRESS NOTES
present. He has no cervical adenopathy. Neurological: He is alert and oriented to person, place, and time. He displays no atrophy. No cranial nerve deficit or sensory deficit. He exhibits normal muscle tone. Coordination normal.   Skin: Skin is warm. No bruising, no ecchymosis and no rash noted. He is not diaphoretic. No pallor. Psychiatric: He has a normal mood and affect. His behavior is normal. His mood appears not anxious. His affect is not angry. His speech is not slurred. He is not aggressive. Cognition and memory are not impaired. He expresses no homicidal ideation. I have personally reviewed and agree with the patient DENNIS, JOSE ARMANDO and Aba Medina is a 80 y.o. male who presents today for follow up on his chronic medical conditions as noted below. Mariola España is c/o of   Chief Complaint   Patient presents with   [employer] Fall     pt had fall yesterday states he feels off balance when walking. pt denies dizziness or spinning feeling.  Tremors     c/o tremors in anastasia. hands and neck. sx present for a few months. dr. Asia Hogue dx pt with parkinsons disease    Diabetes     pt d/c lantus, states bs are good fasting.         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

## 2018-12-19 ENCOUNTER — HOSPITAL ENCOUNTER (OUTPATIENT)
Age: 83
Setting detail: SPECIMEN
Discharge: HOME | End: 2018-12-19
Payer: MEDICARE

## 2018-12-19 DIAGNOSIS — E11.40 TYPE 2 DIABETES MELLITUS WITH DIABETIC NEUROPATHY, UNSPECIFIED WHETHER LONG TERM INSULIN USE (HCC): ICD-10-CM

## 2018-12-19 LAB
CREATININE URINE: 75.1 MG/DL (ref 39–259)
MICROALBUMIN/CREAT 24H UR: 159 MG/L
MICROALBUMIN/CREAT UR-RTO: 212 MCG/MG CREAT

## 2018-12-20 ENCOUNTER — TELEPHONE (OUTPATIENT)
Dept: UROLOGY | Age: 83
End: 2018-12-20

## 2018-12-20 LAB
ESTIMATED AVERAGE GLUCOSE: 166 MG/DL
HBA1C MFR BLD: 7.4 % (ref 4–6)

## 2019-01-21 ENCOUNTER — PROCEDURE VISIT (OUTPATIENT)
Dept: UROLOGY | Age: 84
End: 2019-01-21
Payer: MEDICARE

## 2019-01-21 ENCOUNTER — HOSPITAL ENCOUNTER (OUTPATIENT)
Age: 84
Setting detail: SPECIMEN
Discharge: HOME | End: 2019-01-21
Payer: MEDICARE

## 2019-01-21 VITALS
HEART RATE: 66 BPM | WEIGHT: 180 LBS | DIASTOLIC BLOOD PRESSURE: 94 MMHG | SYSTOLIC BLOOD PRESSURE: 191 MMHG | HEIGHT: 73 IN | BODY MASS INDEX: 23.86 KG/M2 | TEMPERATURE: 97.9 F

## 2019-01-21 DIAGNOSIS — C67.1 MALIGNANT NEOPLASM OF DOME OF URINARY BLADDER (HCC): ICD-10-CM

## 2019-01-21 DIAGNOSIS — R82.90 CLOUDY URINE: Primary | ICD-10-CM

## 2019-01-21 DIAGNOSIS — R82.90 CLOUDY URINE: ICD-10-CM

## 2019-01-21 LAB
-: NORMAL
AMORPHOUS: NORMAL
BACTERIA: NORMAL
BILIRUBIN URINE: NEGATIVE
CASTS UA: NORMAL /LPF (ref 0–8)
COLOR: YELLOW
COMMENT UA: ABNORMAL
CRYSTALS, UA: NORMAL /HPF
EPITHELIAL CELLS UA: NORMAL /HPF (ref 0–5)
GLUCOSE URINE: NEGATIVE
KETONES, URINE: NEGATIVE
LEUKOCYTE ESTERASE, URINE: NEGATIVE
MUCUS: NORMAL
NITRITE, URINE: NEGATIVE
OTHER OBSERVATIONS UA: NORMAL
PH UA: 6.5 (ref 5–8)
PROTEIN UA: ABNORMAL
RBC UA: NORMAL /HPF (ref 0–4)
RENAL EPITHELIAL, UA: NORMAL /HPF
SPECIFIC GRAVITY UA: 1.02 (ref 1–1.03)
TRICHOMONAS: NORMAL
TURBIDITY: CLEAR
URINE HGB: ABNORMAL
UROBILINOGEN, URINE: NORMAL
WBC UA: NORMAL /HPF (ref 0–5)
YEAST: NORMAL

## 2019-01-21 PROCEDURE — 52000 CYSTOURETHROSCOPY: CPT | Performed by: UROLOGY

## 2019-01-21 PROCEDURE — 99999 PR OFFICE/OUTPT VISIT,PROCEDURE ONLY: CPT | Performed by: UROLOGY

## 2019-01-23 LAB
CULTURE: NORMAL
Lab: NORMAL
SPECIMEN DESCRIPTION: NORMAL
STATUS: NORMAL

## 2019-01-30 ENCOUNTER — TELEPHONE (OUTPATIENT)
Dept: UROLOGY | Age: 84
End: 2019-01-30

## 2019-01-30 ENCOUNTER — TELEPHONE (OUTPATIENT)
Dept: INTERNAL MEDICINE CLINIC | Age: 84
End: 2019-01-30

## 2019-02-12 ENCOUNTER — HOSPITAL ENCOUNTER (OUTPATIENT)
Dept: PREADMISSION TESTING | Age: 84
Discharge: HOME | End: 2019-02-16
Payer: MEDICARE

## 2019-02-12 VITALS
DIASTOLIC BLOOD PRESSURE: 62 MMHG | SYSTOLIC BLOOD PRESSURE: 148 MMHG | RESPIRATION RATE: 24 BRPM | WEIGHT: 179.5 LBS | BODY MASS INDEX: 24.31 KG/M2 | HEART RATE: 62 BPM | TEMPERATURE: 98.2 F | HEIGHT: 72 IN | OXYGEN SATURATION: 96 %

## 2019-02-12 LAB
ABO/RH: NORMAL
ANION GAP SERPL CALCULATED.3IONS-SCNC: 9 MMOL/L (ref 9–17)
ANTIBODY SCREEN: NEGATIVE
ARM BAND NUMBER: NORMAL
BUN BLDV-MCNC: 22 MG/DL (ref 8–23)
CHLORIDE BLD-SCNC: 99 MMOL/L (ref 98–107)
CO2: 30 MMOL/L (ref 20–31)
CREAT SERPL-MCNC: 0.67 MG/DL (ref 0.7–1.2)
EKG ATRIAL RATE: 63 BPM
EKG P AXIS: 76 DEGREES
EKG P-R INTERVAL: 236 MS
EKG Q-T INTERVAL: 432 MS
EKG QRS DURATION: 156 MS
EKG QTC CALCULATION (BAZETT): 442 MS
EKG R AXIS: -21 DEGREES
EKG T AXIS: 65 DEGREES
EKG VENTRICULAR RATE: 63 BPM
EXPIRATION DATE: NORMAL
GFR AFRICAN AMERICAN: >60 ML/MIN
GFR NON-AFRICAN AMERICAN: >60 ML/MIN
GFR SERPL CREATININE-BSD FRML MDRD: ABNORMAL ML/MIN/{1.73_M2}
GFR SERPL CREATININE-BSD FRML MDRD: ABNORMAL ML/MIN/{1.73_M2}
GLUCOSE BLD-MCNC: 183 MG/DL (ref 70–99)
HCT VFR BLD CALC: 38.1 % (ref 40.7–50.3)
HEMOGLOBIN: 12.2 G/DL (ref 13–17)
MCH RBC QN AUTO: 30 PG (ref 25.2–33.5)
MCHC RBC AUTO-ENTMCNC: 32 G/DL (ref 28.4–34.8)
MCV RBC AUTO: 93.8 FL (ref 82.6–102.9)
NRBC AUTOMATED: 0 PER 100 WBC
PDW BLD-RTO: 16.7 % (ref 11.8–14.4)
PLATELET # BLD: 262 K/UL (ref 138–453)
PMV BLD AUTO: 12.4 FL (ref 8.1–13.5)
POTASSIUM SERPL-SCNC: 4 MMOL/L (ref 3.7–5.3)
RBC # BLD: 4.06 M/UL (ref 4.21–5.77)
SODIUM BLD-SCNC: 138 MMOL/L (ref 135–144)
WBC # BLD: 7.9 K/UL (ref 3.5–11.3)

## 2019-02-12 PROCEDURE — 87086 URINE CULTURE/COLONY COUNT: CPT

## 2019-02-12 PROCEDURE — 86850 RBC ANTIBODY SCREEN: CPT

## 2019-02-12 PROCEDURE — 86901 BLOOD TYPING SEROLOGIC RH(D): CPT

## 2019-02-12 PROCEDURE — 84520 ASSAY OF UREA NITROGEN: CPT

## 2019-02-12 PROCEDURE — 80051 ELECTROLYTE PANEL: CPT

## 2019-02-12 PROCEDURE — 85027 COMPLETE CBC AUTOMATED: CPT

## 2019-02-12 PROCEDURE — 93005 ELECTROCARDIOGRAM TRACING: CPT

## 2019-02-12 PROCEDURE — 86900 BLOOD TYPING SEROLOGIC ABO: CPT

## 2019-02-12 PROCEDURE — 82947 ASSAY GLUCOSE BLOOD QUANT: CPT

## 2019-02-12 PROCEDURE — 82565 ASSAY OF CREATININE: CPT

## 2019-02-12 RX ORDER — SODIUM CHLORIDE, SODIUM LACTATE, POTASSIUM CHLORIDE, CALCIUM CHLORIDE 600; 310; 30; 20 MG/100ML; MG/100ML; MG/100ML; MG/100ML
1000 INJECTION, SOLUTION INTRAVENOUS CONTINUOUS
Status: CANCELLED | OUTPATIENT
Start: 2019-02-12

## 2019-02-13 LAB
CULTURE: NO GROWTH
Lab: NORMAL
SPECIMEN DESCRIPTION: NORMAL

## 2019-02-18 ENCOUNTER — HOSPITAL ENCOUNTER (OUTPATIENT)
Age: 84
Setting detail: SPECIMEN
Discharge: HOME | End: 2019-02-18
Payer: MEDICARE

## 2019-02-18 LAB
FOLATE: >20 NG/ML
THYROXINE, FREE: 0.94 NG/DL (ref 0.93–1.7)
TSH SERPL DL<=0.05 MIU/L-ACNC: 8.23 MIU/L (ref 0.3–5)
VITAMIN B-12: 809 PG/ML (ref 232–1245)

## 2019-02-20 ENCOUNTER — HOSPITAL ENCOUNTER (OUTPATIENT)
Dept: VASCULAR LAB | Age: 84
Discharge: HOME | End: 2019-02-20
Payer: MEDICARE

## 2019-02-20 ENCOUNTER — HOSPITAL ENCOUNTER (OUTPATIENT)
Dept: MRI IMAGING | Age: 84
Discharge: HOME | End: 2019-02-22
Payer: MEDICARE

## 2019-02-20 DIAGNOSIS — I95.1 ORTHOSTASIS: ICD-10-CM

## 2019-02-20 PROCEDURE — 93880 EXTRACRANIAL BILAT STUDY: CPT

## 2019-02-20 PROCEDURE — 70551 MRI BRAIN STEM W/O DYE: CPT

## 2019-02-22 ENCOUNTER — ANESTHESIA EVENT (OUTPATIENT)
Dept: OPERATING ROOM | Age: 84
DRG: 670 | End: 2019-02-22
Payer: MEDICARE

## 2019-02-22 ENCOUNTER — HOSPITAL ENCOUNTER (INPATIENT)
Age: 84
LOS: 1 days | Discharge: HOME | DRG: 670 | End: 2019-02-23
Attending: UROLOGY | Admitting: UROLOGY
Payer: MEDICARE

## 2019-02-22 ENCOUNTER — ANESTHESIA (OUTPATIENT)
Dept: OPERATING ROOM | Age: 84
DRG: 670 | End: 2019-02-22
Payer: MEDICARE

## 2019-02-22 VITALS — OXYGEN SATURATION: 99 % | TEMPERATURE: 97 F | DIASTOLIC BLOOD PRESSURE: 54 MMHG | SYSTOLIC BLOOD PRESSURE: 99 MMHG

## 2019-02-22 DIAGNOSIS — C67.1 MALIGNANT NEOPLASM OF DOME OF URINARY BLADDER (HCC): Primary | ICD-10-CM

## 2019-02-22 PROBLEM — C67.9 BLADDER CANCER (HCC): Status: ACTIVE | Noted: 2019-02-22

## 2019-02-22 LAB
ANION GAP SERPL CALCULATED.3IONS-SCNC: 9 MMOL/L (ref 9–17)
BUN BLDV-MCNC: 26 MG/DL (ref 8–23)
BUN/CREAT BLD: ABNORMAL (ref 9–20)
CALCIUM SERPL-MCNC: 8.4 MG/DL (ref 8.6–10.4)
CHLORIDE BLD-SCNC: 105 MMOL/L (ref 98–107)
CO2: 28 MMOL/L (ref 20–31)
CREAT SERPL-MCNC: 0.68 MG/DL (ref 0.7–1.2)
GFR AFRICAN AMERICAN: >60 ML/MIN
GFR NON-AFRICAN AMERICAN: >60 ML/MIN
GFR NON-AFRICAN AMERICAN: >60 ML/MIN
GFR SERPL CREATININE-BSD FRML MDRD: >60 ML/MIN
GFR SERPL CREATININE-BSD FRML MDRD: ABNORMAL ML/MIN/{1.73_M2}
GFR SERPL CREATININE-BSD FRML MDRD: ABNORMAL ML/MIN/{1.73_M2}
GFR SERPL CREATININE-BSD FRML MDRD: NORMAL ML/MIN/{1.73_M2}
GLUCOSE BLD-MCNC: 129 MG/DL (ref 75–110)
GLUCOSE BLD-MCNC: 131 MG/DL (ref 70–99)
GLUCOSE BLD-MCNC: 151 MG/DL (ref 75–110)
GLUCOSE BLD-MCNC: 173 MG/DL (ref 74–100)
HCT VFR BLD CALC: 36.9 % (ref 40.7–50.3)
HEMOGLOBIN: 11.8 G/DL (ref 13–17)
MCH RBC QN AUTO: 30.6 PG (ref 25.2–33.5)
MCHC RBC AUTO-ENTMCNC: 32 G/DL (ref 28.4–34.8)
MCV RBC AUTO: 95.8 FL (ref 82.6–102.9)
NRBC AUTOMATED: 0 PER 100 WBC
PDW BLD-RTO: 16.7 % (ref 11.8–14.4)
PLATELET # BLD: 216 K/UL (ref 138–453)
PMV BLD AUTO: 12.2 FL (ref 8.1–13.5)
POC CREATININE: 0.93 MG/DL (ref 0.51–1.19)
POTASSIUM SERPL-SCNC: 4.3 MMOL/L (ref 3.7–5.3)
RBC # BLD: 3.85 M/UL (ref 4.21–5.77)
SODIUM BLD-SCNC: 142 MMOL/L (ref 135–144)
WBC # BLD: 8.1 K/UL (ref 3.5–11.3)

## 2019-02-22 PROCEDURE — 3600000014 HC SURGERY LEVEL 4 ADDTL 15MIN: Performed by: UROLOGY

## 2019-02-22 PROCEDURE — 80048 BASIC METABOLIC PNL TOTAL CA: CPT

## 2019-02-22 PROCEDURE — 2580000003 HC RX 258: Performed by: ANESTHESIOLOGY

## 2019-02-22 PROCEDURE — 2709999900 HC NON-CHARGEABLE SUPPLY: Performed by: UROLOGY

## 2019-02-22 PROCEDURE — 2580000003 HC RX 258: Performed by: NURSE ANESTHETIST, CERTIFIED REGISTERED

## 2019-02-22 PROCEDURE — 88342 IMHCHEM/IMCYTCHM 1ST ANTB: CPT

## 2019-02-22 PROCEDURE — 6360000002 HC RX W HCPCS

## 2019-02-22 PROCEDURE — 0T7D8ZZ DILATION OF URETHRA, VIA NATURAL OR ARTIFICIAL OPENING ENDOSCOPIC: ICD-10-PCS | Performed by: UROLOGY

## 2019-02-22 PROCEDURE — 2580000003 HC RX 258: Performed by: STUDENT IN AN ORGANIZED HEALTH CARE EDUCATION/TRAINING PROGRAM

## 2019-02-22 PROCEDURE — 51700 IRRIGATION OF BLADDER: CPT

## 2019-02-22 PROCEDURE — 6360000002 HC RX W HCPCS: Performed by: NURSE ANESTHETIST, CERTIFIED REGISTERED

## 2019-02-22 PROCEDURE — 3600000004 HC SURGERY LEVEL 4 BASE: Performed by: UROLOGY

## 2019-02-22 PROCEDURE — 88307 TISSUE EXAM BY PATHOLOGIST: CPT

## 2019-02-22 PROCEDURE — 6360000002 HC RX W HCPCS: Performed by: ANESTHESIOLOGY

## 2019-02-22 PROCEDURE — 82947 ASSAY GLUCOSE BLOOD QUANT: CPT

## 2019-02-22 PROCEDURE — 0TBB8ZZ EXCISION OF BLADDER, VIA NATURAL OR ARTIFICIAL OPENING ENDOSCOPIC: ICD-10-PCS | Performed by: UROLOGY

## 2019-02-22 PROCEDURE — 7100000000 HC PACU RECOVERY - FIRST 15 MIN: Performed by: UROLOGY

## 2019-02-22 PROCEDURE — 6360000002 HC RX W HCPCS: Performed by: STUDENT IN AN ORGANIZED HEALTH CARE EDUCATION/TRAINING PROGRAM

## 2019-02-22 PROCEDURE — 1200000000 HC SEMI PRIVATE

## 2019-02-22 PROCEDURE — 3700000000 HC ANESTHESIA ATTENDED CARE: Performed by: UROLOGY

## 2019-02-22 PROCEDURE — 2580000003 HC RX 258: Performed by: UROLOGY

## 2019-02-22 PROCEDURE — 85027 COMPLETE CBC AUTOMATED: CPT

## 2019-02-22 PROCEDURE — 82565 ASSAY OF CREATININE: CPT

## 2019-02-22 PROCEDURE — 7100000001 HC PACU RECOVERY - ADDTL 15 MIN: Performed by: UROLOGY

## 2019-02-22 PROCEDURE — 2500000003 HC RX 250 WO HCPCS: Performed by: NURSE ANESTHETIST, CERTIFIED REGISTERED

## 2019-02-22 PROCEDURE — 3700000001 HC ADD 15 MINUTES (ANESTHESIA): Performed by: UROLOGY

## 2019-02-22 PROCEDURE — 6370000000 HC RX 637 (ALT 250 FOR IP): Performed by: STUDENT IN AN ORGANIZED HEALTH CARE EDUCATION/TRAINING PROGRAM

## 2019-02-22 RX ORDER — OXYCODONE HYDROCHLORIDE AND ACETAMINOPHEN 5; 325 MG/1; MG/1
2 TABLET ORAL PRN
Status: DISCONTINUED | OUTPATIENT
Start: 2019-02-22 | End: 2019-02-22 | Stop reason: HOSPADM

## 2019-02-22 RX ORDER — DEXTROSE MONOHYDRATE 25 G/50ML
12.5 INJECTION, SOLUTION INTRAVENOUS PRN
Status: DISCONTINUED | OUTPATIENT
Start: 2019-02-22 | End: 2019-02-23 | Stop reason: HOSPADM

## 2019-02-22 RX ORDER — M-VIT,TX,IRON,MINS/CALC/FOLIC 27MG-0.4MG
1 TABLET ORAL DAILY
Status: DISCONTINUED | OUTPATIENT
Start: 2019-02-23 | End: 2019-02-23 | Stop reason: HOSPADM

## 2019-02-22 RX ORDER — DOCUSATE SODIUM 100 MG/1
100 CAPSULE, LIQUID FILLED ORAL 2 TIMES DAILY
Status: DISCONTINUED | OUTPATIENT
Start: 2019-02-22 | End: 2019-02-23 | Stop reason: HOSPADM

## 2019-02-22 RX ORDER — CLOTRIMAZOLE AND BETAMETHASONE DIPROPIONATE 10; .64 MG/G; MG/G
CREAM TOPICAL DAILY
Status: DISCONTINUED | OUTPATIENT
Start: 2019-02-22 | End: 2019-02-23 | Stop reason: HOSPADM

## 2019-02-22 RX ORDER — LABETALOL HYDROCHLORIDE 5 MG/ML
10 INJECTION, SOLUTION INTRAVENOUS EVERY 6 HOURS PRN
Status: DISCONTINUED | OUTPATIENT
Start: 2019-02-22 | End: 2019-02-23 | Stop reason: HOSPADM

## 2019-02-22 RX ORDER — TAMSULOSIN HYDROCHLORIDE 0.4 MG/1
0.4 CAPSULE ORAL DAILY
Status: DISCONTINUED | OUTPATIENT
Start: 2019-02-22 | End: 2019-02-23 | Stop reason: HOSPADM

## 2019-02-22 RX ORDER — SIMVASTATIN 20 MG
20 TABLET ORAL DAILY
Status: DISCONTINUED | OUTPATIENT
Start: 2019-02-22 | End: 2019-02-23 | Stop reason: HOSPADM

## 2019-02-22 RX ORDER — ROCURONIUM BROMIDE 10 MG/ML
INJECTION, SOLUTION INTRAVENOUS PRN
Status: DISCONTINUED | OUTPATIENT
Start: 2019-02-22 | End: 2019-02-22 | Stop reason: SDUPTHER

## 2019-02-22 RX ORDER — CEPHALEXIN 500 MG/1
500 CAPSULE ORAL 3 TIMES DAILY
Qty: 9 CAPSULE | Refills: 0 | Status: SHIPPED | OUTPATIENT
Start: 2019-02-22 | End: 2019-02-25

## 2019-02-22 RX ORDER — PROPOFOL 10 MG/ML
INJECTION, EMULSION INTRAVENOUS PRN
Status: DISCONTINUED | OUTPATIENT
Start: 2019-02-22 | End: 2019-02-22 | Stop reason: SDUPTHER

## 2019-02-22 RX ORDER — SODIUM CHLORIDE, SODIUM LACTATE, POTASSIUM CHLORIDE, CALCIUM CHLORIDE 600; 310; 30; 20 MG/100ML; MG/100ML; MG/100ML; MG/100ML
INJECTION, SOLUTION INTRAVENOUS CONTINUOUS
Status: DISCONTINUED | OUTPATIENT
Start: 2019-02-22 | End: 2019-02-22

## 2019-02-22 RX ORDER — HYDRALAZINE HYDROCHLORIDE 20 MG/ML
10 INJECTION INTRAMUSCULAR; INTRAVENOUS EVERY 6 HOURS PRN
Status: DISCONTINUED | OUTPATIENT
Start: 2019-02-22 | End: 2019-02-23 | Stop reason: HOSPADM

## 2019-02-22 RX ORDER — ACETAMINOPHEN 325 MG/1
650 TABLET ORAL EVERY 4 HOURS PRN
Status: DISCONTINUED | OUTPATIENT
Start: 2019-02-22 | End: 2019-02-23 | Stop reason: HOSPADM

## 2019-02-22 RX ORDER — FENTANYL CITRATE 50 UG/ML
INJECTION, SOLUTION INTRAMUSCULAR; INTRAVENOUS PRN
Status: DISCONTINUED | OUTPATIENT
Start: 2019-02-22 | End: 2019-02-22 | Stop reason: SDUPTHER

## 2019-02-22 RX ORDER — FENTANYL CITRATE 50 UG/ML
25 INJECTION, SOLUTION INTRAMUSCULAR; INTRAVENOUS EVERY 5 MIN PRN
Status: DISCONTINUED | OUTPATIENT
Start: 2019-02-22 | End: 2019-02-22 | Stop reason: HOSPADM

## 2019-02-22 RX ORDER — ONDANSETRON 2 MG/ML
INJECTION INTRAMUSCULAR; INTRAVENOUS PRN
Status: DISCONTINUED | OUTPATIENT
Start: 2019-02-22 | End: 2019-02-22 | Stop reason: SDUPTHER

## 2019-02-22 RX ORDER — MIDAZOLAM HYDROCHLORIDE 1 MG/ML
1 INJECTION INTRAMUSCULAR; INTRAVENOUS EVERY 5 MIN PRN
Status: DISCONTINUED | OUTPATIENT
Start: 2019-02-22 | End: 2019-02-22 | Stop reason: HOSPADM

## 2019-02-22 RX ORDER — MORPHINE SULFATE 2 MG/ML
2 INJECTION, SOLUTION INTRAMUSCULAR; INTRAVENOUS EVERY 5 MIN PRN
Status: DISCONTINUED | OUTPATIENT
Start: 2019-02-22 | End: 2019-02-22 | Stop reason: HOSPADM

## 2019-02-22 RX ORDER — ATROPA BELLADONNA AND OPIUM 16.2; 6 MG/1; MG/1
60 SUPPOSITORY RECTAL EVERY 12 HOURS PRN
Status: DISCONTINUED | OUTPATIENT
Start: 2019-02-22 | End: 2019-02-23 | Stop reason: HOSPADM

## 2019-02-22 RX ORDER — LIDOCAINE HYDROCHLORIDE 10 MG/ML
INJECTION, SOLUTION INFILTRATION; PERINEURAL PRN
Status: DISCONTINUED | OUTPATIENT
Start: 2019-02-22 | End: 2019-02-22 | Stop reason: SDUPTHER

## 2019-02-22 RX ORDER — SODIUM CHLORIDE 9 MG/ML
INJECTION, SOLUTION INTRAVENOUS CONTINUOUS
Status: DISCONTINUED | OUTPATIENT
Start: 2019-02-22 | End: 2019-02-23 | Stop reason: HOSPADM

## 2019-02-22 RX ORDER — SODIUM CHLORIDE 0.9 % (FLUSH) 0.9 %
10 SYRINGE (ML) INJECTION PRN
Status: DISCONTINUED | OUTPATIENT
Start: 2019-02-22 | End: 2019-02-23 | Stop reason: HOSPADM

## 2019-02-22 RX ORDER — ONDANSETRON 2 MG/ML
4 INJECTION INTRAMUSCULAR; INTRAVENOUS EVERY 6 HOURS PRN
Status: DISCONTINUED | OUTPATIENT
Start: 2019-02-22 | End: 2019-02-23 | Stop reason: HOSPADM

## 2019-02-22 RX ORDER — FENTANYL CITRATE 50 UG/ML
50 INJECTION, SOLUTION INTRAMUSCULAR; INTRAVENOUS EVERY 5 MIN PRN
Status: COMPLETED | OUTPATIENT
Start: 2019-02-22 | End: 2019-02-22

## 2019-02-22 RX ORDER — HYDROCODONE BITARTRATE AND ACETAMINOPHEN 5; 325 MG/1; MG/1
1 TABLET ORAL EVERY 6 HOURS PRN
Qty: 12 TABLET | Refills: 0 | Status: SHIPPED | OUTPATIENT
Start: 2019-02-22 | End: 2019-02-25

## 2019-02-22 RX ORDER — ONDANSETRON 2 MG/ML
4 INJECTION INTRAMUSCULAR; INTRAVENOUS
Status: DISCONTINUED | OUTPATIENT
Start: 2019-02-22 | End: 2019-02-22 | Stop reason: HOSPADM

## 2019-02-22 RX ORDER — SODIUM CHLORIDE 0.9 % (FLUSH) 0.9 %
10 SYRINGE (ML) INJECTION EVERY 12 HOURS SCHEDULED
Status: DISCONTINUED | OUTPATIENT
Start: 2019-02-22 | End: 2019-02-23 | Stop reason: HOSPADM

## 2019-02-22 RX ORDER — MAGNESIUM HYDROXIDE 1200 MG/15ML
LIQUID ORAL CONTINUOUS PRN
Status: COMPLETED | OUTPATIENT
Start: 2019-02-22 | End: 2019-02-22

## 2019-02-22 RX ORDER — DOCUSATE SODIUM 100 MG/1
100 CAPSULE, LIQUID FILLED ORAL 2 TIMES DAILY
Qty: 30 CAPSULE | Refills: 0 | Status: SHIPPED | OUTPATIENT
Start: 2019-02-22 | End: 2019-03-04

## 2019-02-22 RX ORDER — CEFAZOLIN SODIUM 1 G/3ML
INJECTION, POWDER, FOR SOLUTION INTRAMUSCULAR; INTRAVENOUS PRN
Status: DISCONTINUED | OUTPATIENT
Start: 2019-02-22 | End: 2019-02-22 | Stop reason: SDUPTHER

## 2019-02-22 RX ORDER — HYDROCODONE BITARTRATE AND ACETAMINOPHEN 5; 325 MG/1; MG/1
1 TABLET ORAL EVERY 6 HOURS PRN
Status: DISCONTINUED | OUTPATIENT
Start: 2019-02-22 | End: 2019-02-23 | Stop reason: HOSPADM

## 2019-02-22 RX ORDER — SODIUM CHLORIDE, SODIUM LACTATE, POTASSIUM CHLORIDE, CALCIUM CHLORIDE 600; 310; 30; 20 MG/100ML; MG/100ML; MG/100ML; MG/100ML
1000 INJECTION, SOLUTION INTRAVENOUS CONTINUOUS
Status: DISCONTINUED | OUTPATIENT
Start: 2019-02-22 | End: 2019-02-22

## 2019-02-22 RX ORDER — MULTIVIT WITH MINERALS/LUTEIN
500 TABLET ORAL DAILY
Status: DISCONTINUED | OUTPATIENT
Start: 2019-02-22 | End: 2019-02-23 | Stop reason: HOSPADM

## 2019-02-22 RX ORDER — LABETALOL HYDROCHLORIDE 5 MG/ML
5 INJECTION, SOLUTION INTRAVENOUS EVERY 10 MIN PRN
Status: DISCONTINUED | OUTPATIENT
Start: 2019-02-22 | End: 2019-02-22 | Stop reason: HOSPADM

## 2019-02-22 RX ORDER — FENTANYL CITRATE 50 UG/ML
INJECTION, SOLUTION INTRAMUSCULAR; INTRAVENOUS
Status: COMPLETED
Start: 2019-02-22 | End: 2019-02-22

## 2019-02-22 RX ORDER — NICOTINE POLACRILEX 4 MG
15 LOZENGE BUCCAL PRN
Status: DISCONTINUED | OUTPATIENT
Start: 2019-02-22 | End: 2019-02-23 | Stop reason: HOSPADM

## 2019-02-22 RX ORDER — SODIUM CHLORIDE, SODIUM LACTATE, POTASSIUM CHLORIDE, CALCIUM CHLORIDE 600; 310; 30; 20 MG/100ML; MG/100ML; MG/100ML; MG/100ML
INJECTION, SOLUTION INTRAVENOUS CONTINUOUS PRN
Status: DISCONTINUED | OUTPATIENT
Start: 2019-02-22 | End: 2019-02-22 | Stop reason: SDUPTHER

## 2019-02-22 RX ORDER — OXYCODONE HYDROCHLORIDE AND ACETAMINOPHEN 5; 325 MG/1; MG/1
1 TABLET ORAL PRN
Status: DISCONTINUED | OUTPATIENT
Start: 2019-02-22 | End: 2019-02-22 | Stop reason: HOSPADM

## 2019-02-22 RX ORDER — HYDROCODONE BITARTRATE AND ACETAMINOPHEN 5; 325 MG/1; MG/1
2 TABLET ORAL EVERY 6 HOURS PRN
Status: DISCONTINUED | OUTPATIENT
Start: 2019-02-22 | End: 2019-02-23 | Stop reason: HOSPADM

## 2019-02-22 RX ORDER — GLYCOPYRROLATE 1 MG/5 ML
SYRINGE (ML) INTRAVENOUS PRN
Status: DISCONTINUED | OUTPATIENT
Start: 2019-02-22 | End: 2019-02-22 | Stop reason: SDUPTHER

## 2019-02-22 RX ORDER — DIPHENHYDRAMINE HYDROCHLORIDE 50 MG/ML
12.5 INJECTION INTRAMUSCULAR; INTRAVENOUS
Status: DISCONTINUED | OUTPATIENT
Start: 2019-02-22 | End: 2019-02-22 | Stop reason: HOSPADM

## 2019-02-22 RX ORDER — LIDOCAINE HYDROCHLORIDE 10 MG/ML
1 INJECTION, SOLUTION EPIDURAL; INFILTRATION; INTRACAUDAL; PERINEURAL
Status: DISCONTINUED | OUTPATIENT
Start: 2019-02-22 | End: 2019-02-22 | Stop reason: HOSPADM

## 2019-02-22 RX ORDER — MAGNESIUM HYDROXIDE 1200 MG/15ML
3000 LIQUID ORAL CONTINUOUS
Status: DISCONTINUED | OUTPATIENT
Start: 2019-02-22 | End: 2019-02-23 | Stop reason: HOSPADM

## 2019-02-22 RX ORDER — DEXTROSE MONOHYDRATE 50 MG/ML
100 INJECTION, SOLUTION INTRAVENOUS PRN
Status: DISCONTINUED | OUTPATIENT
Start: 2019-02-22 | End: 2019-02-23 | Stop reason: HOSPADM

## 2019-02-22 RX ORDER — FOLIC ACID 1 MG/1
1 TABLET ORAL
Status: DISCONTINUED | OUTPATIENT
Start: 2019-02-22 | End: 2019-02-23 | Stop reason: HOSPADM

## 2019-02-22 RX ORDER — CLOTRIMAZOLE AND BETAMETHASONE DIPROPIONATE 10; .64 MG/G; MG/G
CREAM TOPICAL
Qty: 1 TUBE | Refills: 0 | Status: SHIPPED | OUTPATIENT
Start: 2019-02-22 | End: 2019-08-14

## 2019-02-22 RX ADMIN — FENTANYL CITRATE 50 MCG: 50 INJECTION, SOLUTION INTRAMUSCULAR; INTRAVENOUS at 16:40

## 2019-02-22 RX ADMIN — FOLIC ACID 1 MG: 1 TABLET ORAL at 18:45

## 2019-02-22 RX ADMIN — CEFAZOLIN 2000 MG: 330 INJECTION, POWDER, FOR SOLUTION INTRAMUSCULAR; INTRAVENOUS at 15:14

## 2019-02-22 RX ADMIN — SODIUM CHLORIDE 3000 ML: 900 IRRIGANT IRRIGATION at 18:41

## 2019-02-22 RX ADMIN — ONDANSETRON 4 MG: 2 INJECTION, SOLUTION INTRAMUSCULAR; INTRAVENOUS at 16:02

## 2019-02-22 RX ADMIN — FENTANYL CITRATE 50 MCG: 50 INJECTION INTRAMUSCULAR; INTRAVENOUS at 15:01

## 2019-02-22 RX ADMIN — SODIUM CHLORIDE: 9 INJECTION, SOLUTION INTRAVENOUS at 18:41

## 2019-02-22 RX ADMIN — FENTANYL CITRATE 25 MCG: 50 INJECTION, SOLUTION INTRAMUSCULAR; INTRAVENOUS at 17:10

## 2019-02-22 RX ADMIN — LIDOCAINE HYDROCHLORIDE 50 MG: 10 INJECTION, SOLUTION INFILTRATION; PERINEURAL at 15:01

## 2019-02-22 RX ADMIN — SIMVASTATIN 20 MG: 20 TABLET, FILM COATED ORAL at 18:45

## 2019-02-22 RX ADMIN — SODIUM CHLORIDE, POTASSIUM CHLORIDE, SODIUM LACTATE AND CALCIUM CHLORIDE 1000 ML: 600; 310; 30; 20 INJECTION, SOLUTION INTRAVENOUS at 13:48

## 2019-02-22 RX ADMIN — ROCURONIUM BROMIDE 30 MG: 10 INJECTION INTRAVENOUS at 15:01

## 2019-02-22 RX ADMIN — DOCUSATE SODIUM 100 MG: 100 CAPSULE, LIQUID FILLED ORAL at 22:00

## 2019-02-22 RX ADMIN — Medication 0.6 MG: at 16:02

## 2019-02-22 RX ADMIN — Medication 2 G: at 15:14

## 2019-02-22 RX ADMIN — ASCORBIC ACID TAB 250 MG 500 MG: 250 TAB at 18:45

## 2019-02-22 RX ADMIN — PROPOFOL 200 MG: 10 INJECTION, EMULSION INTRAVENOUS at 15:01

## 2019-02-22 RX ADMIN — SODIUM CHLORIDE, POTASSIUM CHLORIDE, SODIUM LACTATE AND CALCIUM CHLORIDE: 600; 310; 30; 20 INJECTION, SOLUTION INTRAVENOUS at 14:54

## 2019-02-22 RX ADMIN — NEOSTIGMINE METHYLSULFATE 3 MG: 1 INJECTION, SOLUTION INTRAMUSCULAR; INTRAVENOUS; SUBCUTANEOUS at 16:02

## 2019-02-22 RX ADMIN — FENTANYL CITRATE 50 MCG: 50 INJECTION, SOLUTION INTRAMUSCULAR; INTRAVENOUS at 16:45

## 2019-02-22 RX ADMIN — HYDRALAZINE HYDROCHLORIDE 10 MG: 20 INJECTION INTRAMUSCULAR; INTRAVENOUS at 19:11

## 2019-02-22 RX ADMIN — TAMSULOSIN HYDROCHLORIDE 0.4 MG: 0.4 CAPSULE ORAL at 18:45

## 2019-02-22 RX ADMIN — CLOTRIMAZOLE AND BETAMETHASONE DIPROPIONATE: 10; .5 CREAM TOPICAL at 22:00

## 2019-02-22 ASSESSMENT — PULMONARY FUNCTION TESTS
PIF_VALUE: 16
PIF_VALUE: 16
PIF_VALUE: 17
PIF_VALUE: 17
PIF_VALUE: 15
PIF_VALUE: 17
PIF_VALUE: 16
PIF_VALUE: 17
PIF_VALUE: 16
PIF_VALUE: 17
PIF_VALUE: 1
PIF_VALUE: 17
PIF_VALUE: 4
PIF_VALUE: 17
PIF_VALUE: 16
PIF_VALUE: 17
PIF_VALUE: 21
PIF_VALUE: 17
PIF_VALUE: 16
PIF_VALUE: 17
PIF_VALUE: 19
PIF_VALUE: 16
PIF_VALUE: 1
PIF_VALUE: 17
PIF_VALUE: 15
PIF_VALUE: 17
PIF_VALUE: 17
PIF_VALUE: 16
PIF_VALUE: 16
PIF_VALUE: 17
PIF_VALUE: 15
PIF_VALUE: 15
PIF_VALUE: 1
PIF_VALUE: 16
PIF_VALUE: 16
PIF_VALUE: 17
PIF_VALUE: 16
PIF_VALUE: 17
PIF_VALUE: 17
PIF_VALUE: 16
PIF_VALUE: 16
PIF_VALUE: 17
PIF_VALUE: 17
PIF_VALUE: 16
PIF_VALUE: 17
PIF_VALUE: 15
PIF_VALUE: 17
PIF_VALUE: 16
PIF_VALUE: 16
PIF_VALUE: 17
PIF_VALUE: 16
PIF_VALUE: 15
PIF_VALUE: 16
PIF_VALUE: 17
PIF_VALUE: 18
PIF_VALUE: 17
PIF_VALUE: 17
PIF_VALUE: 0
PIF_VALUE: 4
PIF_VALUE: 16
PIF_VALUE: 14
PIF_VALUE: 17
PIF_VALUE: 15
PIF_VALUE: 17
PIF_VALUE: 2
PIF_VALUE: 1

## 2019-02-22 ASSESSMENT — PAIN SCALES - GENERAL
PAINLEVEL_OUTOF10: 6
PAINLEVEL_OUTOF10: 7
PAINLEVEL_OUTOF10: 6
PAINLEVEL_OUTOF10: 5
PAINLEVEL_OUTOF10: 5
PAINLEVEL_OUTOF10: 7
PAINLEVEL_OUTOF10: 7
PAINLEVEL_OUTOF10: 0

## 2019-02-22 ASSESSMENT — PAIN - FUNCTIONAL ASSESSMENT: PAIN_FUNCTIONAL_ASSESSMENT: 0-10

## 2019-02-22 ASSESSMENT — ENCOUNTER SYMPTOMS
STRIDOR: 0
SHORTNESS OF BREATH: 0

## 2019-02-22 NOTE — H&P (VIEW-ONLY)
History and Physical    Pt Name: Latoya Hardy  MRN: [id number]  YOB: 1932  Date of evaluation: 2/12/2019    SUBJECTIVE:   History of Chief Complaint:    Patient complains of bladder lesion. He has had cystoscopy and TURBT last year. He had a cystoscopy in the office and was noted to have a \"small spot\" in the bladder. He has been scheduled for cystoscopy , TURBT. Past Medical History    has a past medical history of Arthritis; Diabetic neuropathy ([address] First degree AV block; Full dentures; H/O dizziness; Hematuria; Hyperlipidemia; Hypertension; Malignant neoplasm of urinary bladder ([address] RBBB; Tongue cancer ([facility] [address] Type II or unspecified type diabetes mellitus without mention of complication, not stated as uncontrolled; and Wears glasses. Past Surgical History   has a past surgical history that includes Appendectomy ('70's); Cholecystectomy (1981); Tongue Biopsy (11/2014); bronchoscopy; Colonoscopy; Clavicle surgery (Right, 1949); Tonsillectomy; Gastrostomy tube placement (11/25/2014); Umbilical hernia repair; Tunneled venous port placement (Left, 11/17/14); Cystocopy (03/23/2018); TURP (03/23/2018); pr office/outpt visit,procedure only (N/A, 3/23/2018); and Endoscopy, colon, diagnostic. Medications  Prior to Admission medications    Medication Sig Start Date End Date Taking?  Authorizing Provider   glimepiride (AMARYL) 2 MG tablet Take 1 tablet by mouth every morning (before breakfast) 12/18/18 2/12/19 Yes Ritesh Schulz MD   Ascorbic Acid (VITAMIN C) 500 MG CHEW Take 500 mg by mouth daily   Yes Historical Provider, MD   ferrous sulfate 325 (65 Fe) MG tablet Take 325 mg by mouth daily (with breakfast)   Yes Historical Provider, MD   Cholecalciferol (VITAMIN D) 2000 units CAPS capsule Take by mouth   Yes Historical Provider, MD   simvastatin (ZOCOR) 20 MG tablet TAKE ONE TABLET BY MOUTH ONCE DAILY 10/2/18  Yes Ritesh Schulz MD   blood glucose test strips (TRUETEST TEST) strip 1 each by In Vitro route daily 8/21/18  Yes Jenifer Rae MD   tamsulosin (FLOMAX) 0.4 MG capsule TAKE 1 CAPSULE BY MOUTH ONCE DAILY WITH  SUPPER 8/20/18  Yes Josefina Funez MD   methotrexate (RHEUMATREX) 2.5 MG chemo tablet TK 6 TS PO ONCE A WEEK 4/14/18  Yes Historical Provider, MD   folic acid (FOLVITE) 1 MG tablet Take 1 mg by mouth Daily with supper   Yes Historical Provider, MD   Multiple Vitamins-Minerals (THERAPEUTIC MULTIVITAMIN-MINERALS) tablet Take 1 tablet by mouth daily   Yes Historical Provider, MD   lisinopril (PRINIVIL;ZESTRIL) 5 MG tablet Take 5 mg by mouth Daily with supper    Yes Historical Provider, MD   Lancets MISC 1 each by Does not apply route daily 1/26/16  Yes Francy Kamara MD   glucose monitoring kit (FREESTYLE) monitoring kit 1 kit by Does not apply route daily as needed 1/26/16  Yes Francy Kamara MD   Insulin Pen Needle (PEN NEEDLES 31GX5/16\") 31G X 8 MM MISC 1 each by Does not apply route 2 times daily. Yes Historical Provider, MD   aspirin 81 MG tablet Take 81 mg by mouth daily    Historical Provider, MD     Allergies  has No Known Allergies. Family History  family history includes Arthritis in his father; Heart Failure in his father; No Known Problems in his maternal grandfather, maternal grandmother, paternal grandfather, and paternal grandmother. Social History   reports that he quit smoking about 27 years ago. His smoking use included Cigarettes. He started smoking about 71 years ago. He smoked 2.00 packs per day. He has never used smokeless tobacco.  Up to 2 PPD for 50 years, quit 1991. reports that he does not drink alcohol. reports that he does not use drugs. Marital Status   Occupation retired    OBJECTIVE:   VITALS:  height is 6' (1.829 m) and weight is 179 lb 8 oz (81.4 kg). His oral temperature is 98.2 °F (36.8 °C). His blood pressure is 148/62 (abnormal) and his pulse is 62. His respiration is 24 and oxygen saturation is 96%.    CONSTITUTIONAL:alert &

## 2019-02-22 NOTE — INTERVAL H&P NOTE
H&P reviewed. The patient was examined and there are no changes to the H&P.      Samantha Salazar MD  2/22/19

## 2019-02-23 VITALS
TEMPERATURE: 98.5 F | WEIGHT: 181 LBS | SYSTOLIC BLOOD PRESSURE: 161 MMHG | OXYGEN SATURATION: 94 % | HEART RATE: 79 BPM | RESPIRATION RATE: 16 BRPM | BODY MASS INDEX: 23.99 KG/M2 | DIASTOLIC BLOOD PRESSURE: 67 MMHG | HEIGHT: 73 IN

## 2019-02-23 LAB
ANION GAP SERPL CALCULATED.3IONS-SCNC: 12 MMOL/L (ref 9–17)
BUN BLDV-MCNC: 21 MG/DL (ref 8–23)
BUN/CREAT BLD: ABNORMAL (ref 9–20)
CALCIUM SERPL-MCNC: 8.5 MG/DL (ref 8.6–10.4)
CHLORIDE BLD-SCNC: 104 MMOL/L (ref 98–107)
CO2: 24 MMOL/L (ref 20–31)
CREAT SERPL-MCNC: 0.65 MG/DL (ref 0.7–1.2)
GFR AFRICAN AMERICAN: >60 ML/MIN
GFR NON-AFRICAN AMERICAN: >60 ML/MIN
GFR SERPL CREATININE-BSD FRML MDRD: ABNORMAL ML/MIN/{1.73_M2}
GFR SERPL CREATININE-BSD FRML MDRD: ABNORMAL ML/MIN/{1.73_M2}
GLUCOSE BLD-MCNC: 118 MG/DL (ref 70–99)
GLUCOSE BLD-MCNC: 126 MG/DL (ref 75–110)
GLUCOSE BLD-MCNC: 193 MG/DL (ref 75–110)
HCT VFR BLD CALC: 36 % (ref 40.7–50.3)
HEMOGLOBIN: 11.5 G/DL (ref 13–17)
MCH RBC QN AUTO: 30.2 PG (ref 25.2–33.5)
MCHC RBC AUTO-ENTMCNC: 31.9 G/DL (ref 28.4–34.8)
MCV RBC AUTO: 94.5 FL (ref 82.6–102.9)
NRBC AUTOMATED: 0 PER 100 WBC
PDW BLD-RTO: 16.8 % (ref 11.8–14.4)
PLATELET # BLD: 235 K/UL (ref 138–453)
PMV BLD AUTO: 12.6 FL (ref 8.1–13.5)
POTASSIUM SERPL-SCNC: 4.2 MMOL/L (ref 3.7–5.3)
RBC # BLD: 3.81 M/UL (ref 4.21–5.77)
SODIUM BLD-SCNC: 140 MMOL/L (ref 135–144)
WBC # BLD: 10 K/UL (ref 3.5–11.3)

## 2019-02-23 PROCEDURE — 6370000000 HC RX 637 (ALT 250 FOR IP): Performed by: STUDENT IN AN ORGANIZED HEALTH CARE EDUCATION/TRAINING PROGRAM

## 2019-02-23 PROCEDURE — 85027 COMPLETE CBC AUTOMATED: CPT

## 2019-02-23 PROCEDURE — 80048 BASIC METABOLIC PNL TOTAL CA: CPT

## 2019-02-23 PROCEDURE — 6360000002 HC RX W HCPCS: Performed by: STUDENT IN AN ORGANIZED HEALTH CARE EDUCATION/TRAINING PROGRAM

## 2019-02-23 PROCEDURE — 82947 ASSAY GLUCOSE BLOOD QUANT: CPT

## 2019-02-23 PROCEDURE — 2580000003 HC RX 258: Performed by: STUDENT IN AN ORGANIZED HEALTH CARE EDUCATION/TRAINING PROGRAM

## 2019-02-23 PROCEDURE — 36415 COLL VENOUS BLD VENIPUNCTURE: CPT

## 2019-02-23 PROCEDURE — 51700 IRRIGATION OF BLADDER: CPT

## 2019-02-23 PROCEDURE — 51702 INSERT TEMP BLADDER CATH: CPT

## 2019-02-23 RX ADMIN — CEFAZOLIN 1 G: 1 INJECTION, POWDER, FOR SOLUTION INTRAMUSCULAR; INTRAVENOUS at 08:34

## 2019-02-23 RX ADMIN — TAMSULOSIN HYDROCHLORIDE 0.4 MG: 0.4 CAPSULE ORAL at 08:56

## 2019-02-23 RX ADMIN — CEFAZOLIN 1 G: 1 INJECTION, POWDER, FOR SOLUTION INTRAMUSCULAR; INTRAVENOUS at 00:12

## 2019-02-23 RX ADMIN — DOCUSATE SODIUM 100 MG: 100 CAPSULE, LIQUID FILLED ORAL at 08:56

## 2019-02-23 RX ADMIN — MULTIPLE VITAMINS W/ MINERALS TAB 1 TABLET: TAB at 09:05

## 2019-02-23 RX ADMIN — CLOTRIMAZOLE AND BETAMETHASONE DIPROPIONATE: 10; .5 CREAM TOPICAL at 09:15

## 2019-02-23 RX ADMIN — SIMVASTATIN 20 MG: 20 TABLET, FILM COATED ORAL at 08:57

## 2019-02-23 RX ADMIN — ASCORBIC ACID TAB 250 MG 500 MG: 250 TAB at 08:57

## 2019-02-23 NOTE — PROGRESS NOTES
Urology Progress Note     Subjective:   No acute events. Urine clear on slow gtt CBI. No pain. No fevers, chills, chest pain, shortness of breath, nausea or vomiting.   -flatus -BM    Patient Vitals for the past 24 hrs:   BP Temp Temp src Pulse Resp SpO2 Height Weight   02/23/19 0305 (!) 140/54 97.5 °F (36.4 °C) Oral 58 15 98 % - -   02/22/19 2302 (!) 119/47 98.2 °F (36.8 °C) Oral 62 16 96 % - -   02/22/19 1931 (!) 151/59 98.6 °F (37 °C) Oral 77 16 97 % - -   02/22/19 1809 (!) 204/73 97.5 °F (36.4 °C) - 72 15 96 % - -   02/22/19 1748 - - - 69 - - - -   02/22/19 1730 (!) 187/77 - - 72 12 96 % - -   02/22/19 1721 - - - 67 - - - -   02/22/19 1715 (!) 168/61 97.3 °F (36.3 °C) - 67 11 95 % - -   02/22/19 1700 (!) 190/70 - - 65 13 99 % - -   02/22/19 1645 (!) 168/61 - - 74 13 100 % - -   02/22/19 1630 (!) 135/55 - - 65 14 100 % - -   02/22/19 1619 (!) 165/68 97.3 °F (36.3 °C) - 64 15 99 % - -   02/22/19 1319 (!) 165/76 97.7 °F (36.5 °C) Temporal 75 16 99 % - -   02/22/19 1307 - - - - - - 6' 0.5\" (1.842 m) 181 lb (82.1 kg)       Intake/Output Summary (Last 24 hours) at 02/23/19 0813  Last data filed at 02/23/19 0600   Gross per 24 hour   Intake             2526 ml   Output            -4950 ml   Net             7476 ml       Recent Labs      02/22/19   1742  02/23/19   0613   WBC  8.1  10.0   HGB  11.8*  11.5*   HCT  36.9*  36.0*   MCV  95.8  94.5   PLT  216  235     Recent Labs      02/22/19   1340  02/22/19   1742  02/23/19   0613   NA   --   142  140   K   --   4.3  4.2   CL   --   105  104   CO2   --   28  24   BUN   --   26*  21   CREATININE  0.93  0.68*  0.65*       No results for input(s): COLORU, PHUR, LABCAST, WBCUA, RBCUA, MUCUS, TRICHOMONAS, YEAST, BACTERIA, CLARITYU, SPECGRAV, LEUKOCYTESUR, UROBILINOGEN, BILIRUBINUR, BLOODU in the last 72 hours.     Invalid input(s): NITRATE, GLUCOSEUKETONESUAMORPHOUS    Additional Lab/culture results:  None    Physical Exam:   AAOx3  NAD  Unlabored breathing  Normal rate  Abdomen soft,  nondistended  : Curtis in place draining clear urine to slow gtt CBI  No calf tenderness to palpation         Interval Imaging Findings:    Impression:    80 y.o. male POD #1 s/p TURBT    Plan:   CBI clamped  Regular diet  Ambulate/OOB  IS  Plan for DC today with Curtis catheter. Follow up with Dr. Cassy Zuniga office on Wednesday for removal.     Marilin Payne  8:13 AM 2/23/2019    Home today with curtis.

## 2019-02-23 NOTE — PROGRESS NOTES
Patient admitted to room 315 at 1810 s/p TURBT for Bladder CA, oriented patient and family to room and call light, 3 way curtis intact with CBI running at moderate rate, comfort care plan intiiated, incentive spirometer at bedside, IV infusing and patient denies any pain on assessment, EPC's to BLE while in bed patient ordered dinner tray and Dr. Marybeth Lomeli notified of 's telemetry applied and Hydralazine IV given and ordered PRN, AM meds given

## 2019-02-23 NOTE — DISCHARGE SUMMARY
DISCHARGE SUMMARY NOTE:      Patient Identification  PATIENT: Sunil Mistry is a 80 y.o. male. MRN: [id number]  :  1932  Admit Date:  2019  Discharge date:   2019                                   Disposition: home  Discharged Condition:  good  Discharge Diagnoses:   Bladder tumor     Consults: none    Surgery:   1. Rigid cystourethroscopy. 2. Urethral dilation using Joshua Pyo sounds. 2. Transurethral resection of bladder tumor - MEDIUM    Patient Instructions: Activity: no heavy lifting or straining until Gruber removal   Diet: As tolerated  Patient told to follow up with Dr. Yolanda Trinidad in 5 days (19 for Gruber removal)   Discharge Medications:    Samreen Telles   Home Medication Instructions [id number]    Printed on:19   Medication Information                      Ascorbic Acid (VITAMIN C) 500 MG CHEW  Take 500 mg by mouth daily             blood glucose test strips (TRUETEST TEST) strip  1 each by In Vitro route daily             cephALEXin (KEFLEX) 500 MG capsule  Take 1 capsule by mouth 3 times daily for 3 days             Cholecalciferol (VITAMIN D) 2000 units CAPS capsule  Take by mouth             clotrimazole-betamethasone (LOTRISONE) 1-0.05 % cream  Apply topically to head of penis (retract foreskin) once a day. docusate sodium (COLACE) 100 MG capsule  Take 1 capsule by mouth 2 times daily for 15 days Take twice daily while on pain medication and not having bowel movements. Hold for loose stool. folic acid (FOLVITE) 1 MG tablet  Take 1 mg by mouth Daily with supper             glimepiride (AMARYL) 2 MG tablet  Take 1 tablet by mouth every morning (before breakfast)             glucose monitoring kit (FREESTYLE) monitoring kit  1 kit by Does not apply route daily as needed             HYDROcodone-acetaminophen (NORCO) 5-325 MG per tablet  Take 1 tablet by mouth every 6 hours as needed for Pain for up to 3 days.  Intended supply: 3 days. Take lowest dose possible to manage pain. Insulin Pen Needle (PEN NEEDLES 31GX5/16\") 31G X 8 MM MISC  1 each by Does not apply route 2 times daily. Lancets MISC  1 each by Does not apply route daily             lisinopril (PRINIVIL;ZESTRIL) 5 MG tablet  Take 5 mg by mouth Daily with supper              methotrexate (RHEUMATREX) 2.5 MG chemo tablet  TK 6 TS PO ONCE A WEEK             Multiple Vitamins-Minerals (THERAPEUTIC MULTIVITAMIN-MINERALS) tablet  Take 1 tablet by mouth daily             simvastatin (ZOCOR) 20 MG tablet  TAKE ONE TABLET BY MOUTH ONCE DAILY             tamsulosin (FLOMAX) 0.4 MG capsule  TAKE 1 CAPSULE BY MOUTH ONCE DAILY WITH  SUPPER               Hospital course: The patient did well in their hospital course. Admitted after TURBT. Tolerated procedure well. Continuous bladder irrigation was weaned overnight and then clamped on POD #1. Was also treated for balanitis with lotrisone cream. By the date of discharge, POD #1, the patient had pain controlled with PO meds, tolerated diet, and was ambulating appropriately. The patient was afebrile for 24 hrs before discharge. The patient agrees to discharge, understands discharge instructions, and agrees to follow up.     Dc Drains: Gruber catheter   Rx medications: Norco, Lotrisone cream, ColaceEstelle  9:58 AM 2/23/2019

## 2019-02-23 NOTE — CARE COORDINATION
Case Management Initial Discharge Plan  Dany Gerardo             Met with:patient to discuss discharge plans. Information verified: address, contacts, phone number, , insurance Yes  PCP: Madisyn Barclay MD  Date of last visit: 1 month ago    Insurance Provider: medicare    Discharge Planning    Living Arrangements:  Spouse/Significant Other   Support Systems:  Spouse/Significant Other    Home has 1 stories  2 stairs to climb to get into front door, 0stairs to climb to reach second floor  Location of bedroom/bathroom in home main    Patient able to perform ADL's:Independent    Current Services (outpatient & in home) none  DME equipment: cane  DME provider: Shiela    Pharmacy: [facility]   Potential Assistance Purchasing Medications:  No  Does patient want to participate in local refill/ meds to beds program?  No    Potential Assistance Needed:  N/A    Patient agreeable to home care: No  [place] of choice provided:  n/a    Prior SNF/Rehab Placement and Facility: n/a  Agreeable to SNF/Rehab: No  [place] of choice provided: n/a   Evaluation: no    Expected Discharge date:  19  Patient expects to be discharged to:  Home  Follow Up Appointment: Best Day/ Time: Monday AM    Transportation provider: spouse  Transportation arrangements needed for discharge: No    Readmission Risk              Risk of Unplanned Readmission:        12             Does patient have a readmission risk score greater than 14?: No  If yes, follow-up appointment must be made within 7 days of discharge. Discharge Plan: home with spouse independently.           Electronically signed by Comfort Nicole RN on 19 at 11:04 AM

## 2019-02-24 ENCOUNTER — CARE COORDINATION (OUTPATIENT)
Dept: CASE MANAGEMENT | Age: 84
End: 2019-02-24

## 2019-02-24 DIAGNOSIS — C67.9 MALIGNANT NEOPLASM OF URINARY BLADDER, UNSPECIFIED SITE (HCC): Primary | ICD-10-CM

## 2019-02-24 PROCEDURE — 1111F DSCHRG MED/CURRENT MED MERGE: CPT | Performed by: INTERNAL MEDICINE

## 2019-02-24 NOTE — CARE COORDINATION
Discharge [facility] Case Management Department  Written by: Viola Jesus RN    Patient Name: Teo Dela Cruz  Attending Provider: No att. providers found  Admit Date: 2019 12:34 PM  MRN: [id number]  Account: [de-identified]                     : 1932  Discharge Date: 2019      Disposition: home with spouse independently.     Viola Jesus RN

## 2019-02-26 ENCOUNTER — CARE COORDINATION (OUTPATIENT)
Dept: CASE MANAGEMENT | Age: 84
End: 2019-02-26

## 2019-02-27 ENCOUNTER — PROCEDURE VISIT (OUTPATIENT)
Dept: UROLOGY | Age: 84
End: 2019-02-27

## 2019-02-27 VITALS
TEMPERATURE: 98.4 F | DIASTOLIC BLOOD PRESSURE: 70 MMHG | WEIGHT: 180.78 LBS | BODY MASS INDEX: 23.96 KG/M2 | HEIGHT: 73 IN | HEART RATE: 68 BPM | SYSTOLIC BLOOD PRESSURE: 172 MMHG

## 2019-02-27 DIAGNOSIS — C67.1 MALIGNANT NEOPLASM OF DOME OF URINARY BLADDER (HCC): Primary | ICD-10-CM

## 2019-02-27 PROCEDURE — 99999 PR OFFICE/OUTPT VISIT,PROCEDURE ONLY: CPT | Performed by: NURSE PRACTITIONER

## 2019-02-28 LAB — SURGICAL PATHOLOGY REPORT: NORMAL

## 2019-03-01 ENCOUNTER — CARE COORDINATION (OUTPATIENT)
Dept: CASE MANAGEMENT | Age: 84
End: 2019-03-01

## 2019-03-04 ENCOUNTER — OFFICE VISIT (OUTPATIENT)
Dept: UROLOGY | Age: 84
End: 2019-03-04
Payer: MEDICARE

## 2019-03-04 VITALS
WEIGHT: 180.8 LBS | DIASTOLIC BLOOD PRESSURE: 72 MMHG | BODY MASS INDEX: 23.96 KG/M2 | TEMPERATURE: 97.9 F | SYSTOLIC BLOOD PRESSURE: 180 MMHG | HEART RATE: 65 BPM | HEIGHT: 73 IN

## 2019-03-04 DIAGNOSIS — R31.0 GROSS HEMATURIA: ICD-10-CM

## 2019-03-04 DIAGNOSIS — C67.1 MALIGNANT NEOPLASM OF DOME OF URINARY BLADDER (HCC): Primary | ICD-10-CM

## 2019-03-04 DIAGNOSIS — R39.15 URGENCY OF URINATION: ICD-10-CM

## 2019-03-04 DIAGNOSIS — R35.0 FREQUENCY OF URINATION: ICD-10-CM

## 2019-03-04 PROCEDURE — 51798 US URINE CAPACITY MEASURE: CPT | Performed by: UROLOGY

## 2019-03-04 PROCEDURE — 1101F PT FALLS ASSESS-DOCD LE1/YR: CPT | Performed by: UROLOGY

## 2019-03-04 PROCEDURE — 99214 OFFICE O/P EST MOD 30 MIN: CPT | Performed by: UROLOGY

## 2019-03-04 PROCEDURE — 1123F ACP DISCUSS/DSCN MKR DOCD: CPT | Performed by: UROLOGY

## 2019-03-04 PROCEDURE — G8420 CALC BMI NORM PARAMETERS: HCPCS | Performed by: UROLOGY

## 2019-03-04 PROCEDURE — 1036F TOBACCO NON-USER: CPT | Performed by: UROLOGY

## 2019-03-04 PROCEDURE — G8427 DOCREV CUR MEDS BY ELIG CLIN: HCPCS | Performed by: UROLOGY

## 2019-03-04 PROCEDURE — 4040F PNEUMOC VAC/ADMIN/RCVD: CPT | Performed by: UROLOGY

## 2019-03-04 PROCEDURE — G8484 FLU IMMUNIZE NO ADMIN: HCPCS | Performed by: UROLOGY

## 2019-03-04 PROCEDURE — 1111F DSCHRG MED/CURRENT MED MERGE: CPT | Performed by: UROLOGY

## 2019-03-04 ASSESSMENT — ENCOUNTER SYMPTOMS
VOMITING: 0
SHORTNESS OF BREATH: 0
EYE PAIN: 0
NAUSEA: 0
ABDOMINAL PAIN: 0
WHEEZING: 0
COUGH: 0
BACK PAIN: 0
COLOR CHANGE: 0
EYE REDNESS: 0

## 2019-03-05 ENCOUNTER — CARE COORDINATION (OUTPATIENT)
Dept: CASE MANAGEMENT | Age: 84
End: 2019-03-05

## 2019-03-25 ENCOUNTER — HOSPITAL ENCOUNTER (OUTPATIENT)
Age: 84
Setting detail: SPECIMEN
Discharge: HOME | End: 2019-03-25
Payer: MEDICARE

## 2019-03-25 ENCOUNTER — TELEPHONE (OUTPATIENT)
Dept: UROLOGY | Age: 84
End: 2019-03-25

## 2019-03-25 DIAGNOSIS — R30.0 DYSURIA: ICD-10-CM

## 2019-03-25 DIAGNOSIS — R30.0 DYSURIA: Primary | ICD-10-CM

## 2019-03-25 LAB
-: NORMAL
AMORPHOUS: NORMAL
BACTERIA: NORMAL
BILIRUBIN URINE: NEGATIVE
CASTS UA: NORMAL /LPF (ref 0–2)
COLOR: YELLOW
COMMENT UA: ABNORMAL
CRYSTALS, UA: NORMAL /HPF
EPITHELIAL CELLS UA: NORMAL /HPF (ref 0–5)
GLUCOSE URINE: ABNORMAL
KETONES, URINE: NEGATIVE
LEUKOCYTE ESTERASE, URINE: ABNORMAL
MUCUS: NORMAL
NITRITE, URINE: NEGATIVE
OTHER OBSERVATIONS UA: NORMAL
PH UA: 5.5 (ref 5–8)
PROTEIN UA: ABNORMAL
RBC UA: NORMAL /HPF (ref 0–2)
RENAL EPITHELIAL, UA: NORMAL /HPF
SPECIFIC GRAVITY UA: 1.02 (ref 1–1.03)
TRICHOMONAS: NORMAL
TURBIDITY: CLEAR
URINE HGB: ABNORMAL
UROBILINOGEN, URINE: NORMAL
WBC UA: NORMAL /HPF (ref 0–5)
YEAST: NORMAL

## 2019-03-25 NOTE — TELEPHONE ENCOUNTER
Pt wife called writer stating \" My  is getting up 4 times at night to urinate and only pees a little at a time. He has been complaining of burning when peeing. He was told everything will be better once he has his procedure but that's not scheduled yet it'll be late May early June. He is having frequency from this morning he has been in the restroom 4 times. \" Aram Chery informed pt wife to please have patient go to lab and get Urinalysis Reflex to Culture done. Informed pt wife an order has been placed. Per Dr. Avril Arizmendi to place a Urinalysis Reflex to Culture.  Orders placed

## 2019-03-28 DIAGNOSIS — N39.0 URINARY TRACT INFECTION WITHOUT HEMATURIA, SITE UNSPECIFIED: Primary | ICD-10-CM

## 2019-03-28 LAB
CULTURE: ABNORMAL
Lab: ABNORMAL
SPECIMEN DESCRIPTION: ABNORMAL

## 2019-03-28 RX ORDER — CIPROFLOXACIN 500 MG/1
500 TABLET, FILM COATED ORAL 2 TIMES DAILY
Qty: 14 TABLET | Refills: 0 | Status: SHIPPED | OUTPATIENT
Start: 2019-03-28 | End: 2019-04-04

## 2019-04-04 ENCOUNTER — TELEPHONE (OUTPATIENT)
Dept: UROLOGY | Age: 84
End: 2019-04-04

## 2019-04-04 NOTE — TELEPHONE ENCOUNTER
Nemo Rodriguez *possible TURBT* @ ST 5/1/19 8:00am **STOP BLOOD THINNERS 4/24/19**  PAT @ [facility] 4/15/19        Spoke with wife procedure info emailed 4/4/19

## 2019-04-15 ENCOUNTER — HOSPITAL ENCOUNTER (OUTPATIENT)
Dept: PREADMISSION TESTING | Age: 84
Discharge: HOME | End: 2019-04-19
Payer: MEDICARE

## 2019-04-15 VITALS
RESPIRATION RATE: 20 BRPM | TEMPERATURE: 98 F | SYSTOLIC BLOOD PRESSURE: 185 MMHG | WEIGHT: 177 LBS | OXYGEN SATURATION: 98 % | HEIGHT: 73 IN | HEART RATE: 60 BPM | DIASTOLIC BLOOD PRESSURE: 80 MMHG | BODY MASS INDEX: 23.46 KG/M2

## 2019-04-15 LAB
ANION GAP SERPL CALCULATED.3IONS-SCNC: 10 MMOL/L (ref 9–17)
BUN BLDV-MCNC: 17 MG/DL (ref 8–23)
CHLORIDE BLD-SCNC: 102 MMOL/L (ref 98–107)
CO2: 28 MMOL/L (ref 20–31)
CREAT SERPL-MCNC: 0.66 MG/DL (ref 0.7–1.2)
GFR AFRICAN AMERICAN: >60 ML/MIN
GFR NON-AFRICAN AMERICAN: >60 ML/MIN
GFR SERPL CREATININE-BSD FRML MDRD: ABNORMAL ML/MIN/{1.73_M2}
GFR SERPL CREATININE-BSD FRML MDRD: ABNORMAL ML/MIN/{1.73_M2}
GLUCOSE BLD-MCNC: 87 MG/DL (ref 70–99)
HCT VFR BLD CALC: 46.1 % (ref 40.7–50.3)
HEMOGLOBIN: 14.2 G/DL (ref 13–17)
POTASSIUM SERPL-SCNC: 4.4 MMOL/L (ref 3.7–5.3)
SODIUM BLD-SCNC: 140 MMOL/L (ref 135–144)

## 2019-04-15 PROCEDURE — 84520 ASSAY OF UREA NITROGEN: CPT

## 2019-04-15 PROCEDURE — 87086 URINE CULTURE/COLONY COUNT: CPT

## 2019-04-15 PROCEDURE — 36415 COLL VENOUS BLD VENIPUNCTURE: CPT

## 2019-04-15 PROCEDURE — 85018 HEMOGLOBIN: CPT

## 2019-04-15 PROCEDURE — 85014 HEMATOCRIT: CPT

## 2019-04-15 PROCEDURE — 82947 ASSAY GLUCOSE BLOOD QUANT: CPT

## 2019-04-15 PROCEDURE — 82565 ASSAY OF CREATININE: CPT

## 2019-04-15 PROCEDURE — 80051 ELECTROLYTE PANEL: CPT

## 2019-04-15 RX ORDER — INSULIN GLARGINE 100 [IU]/ML
6 INJECTION, SOLUTION SUBCUTANEOUS PRN
Status: ON HOLD | COMMUNITY
End: 2019-05-01

## 2019-04-15 RX ORDER — SODIUM CHLORIDE, SODIUM LACTATE, POTASSIUM CHLORIDE, CALCIUM CHLORIDE 600; 310; 30; 20 MG/100ML; MG/100ML; MG/100ML; MG/100ML
1000 INJECTION, SOLUTION INTRAVENOUS CONTINUOUS
Status: CANCELLED | OUTPATIENT
Start: 2019-04-15

## 2019-04-15 NOTE — H&P
History and Physical    Pt Name: Sunil Mistry  MRN: [id number]  YOB: 1932  Date of evaluation: 4/15/2019    SUBJECTIVE:   History of Chief Complaint:    Patient complains of prostate hypertrophy. He says that he had bladder surgery for tumor a few months ago, TURBT. He has now symptoms of enlarged prostate, obstruction. He has been scheduled for cystoscopy and TURP. Past Medical History    has a past medical history of Arthritis, Diabetic neuropathy ([address] First degree AV block, Full dentures, H/O dizziness, Hematuria, Hyperlipidemia, Hypertension, Malignant neoplasm of urinary bladder ([address] RBBB, Tongue cancer ([facility] [address] Type II or unspecified type diabetes mellitus without mention of complication, not stated as uncontrolled, and Wears glasses. Past Surgical History   has a past surgical history that includes Appendectomy ('70's); Cholecystectomy (1981); Tongue Biopsy (11/2014); bronchoscopy; Colonoscopy; Clavicle surgery (Right, 1949); Tonsillectomy; Gastrostomy tube placement (11/25/2014); Umbilical hernia repair; Tunneled venous port placement (Left, 11/17/14); Cystocopy (03/23/2018); TURP (03/23/2018); pr office/outpt visit,procedure only (N/A, 3/23/2018); Endoscopy, colon, diagnostic; Cystocopy (02/22/2019); and Cystoscopy (N/A, 2/22/2019). Medications  Prior to Admission medications    Medication Sig Start Date End Date Taking?  Authorizing Provider   aspirin 81 MG tablet Take 81 mg by mouth daily Stopped for procedure on 5/1/19   Yes Historical Provider, MD   Multiple Vitamins-Minerals (CENTRUM SILVER PO) Take 1 tablet by mouth daily   Yes Historical Provider, MD   insulin glargine (LANTUS) 100 UNIT/ML injection vial Inject 6 Units into the skin as needed   Yes Historical Provider, MD   glimepiride (AMARYL) 2 MG tablet Take 1 tablet by mouth every morning (before breakfast) 12/18/18 4/15/19 Yes Whitney Clemente MD   Ascorbic Acid (VITAMIN C) 500 MG CHEW Take 500 mg by mouth daily   Yes Historical Provider, MD   simvastatin (ZOCOR) 20 MG tablet TAKE ONE TABLET BY MOUTH ONCE DAILY 10/2/18  Yes Dequan Martínez MD   tamsulosin (FLOMAX) 0.4 MG capsule TAKE 1 CAPSULE BY MOUTH ONCE DAILY WITH  SUPPER 8/20/18  Yes Hemanth Franklin MD   methotrexate (RHEUMATREX) 2.5 MG chemo tablet TK 6 TS PO ONCE A WEEK 4/14/18  Yes Historical Provider, MD   folic acid (FOLVITE) 1 MG tablet Take 1 mg by mouth Daily with supper   Yes Historical Provider, MD   lisinopril (PRINIVIL;ZESTRIL) 5 MG tablet Take 5 mg by mouth Daily with supper    Yes Historical Provider, MD   clotrimazole-betamethasone (Rosezena Beery) 1-0.05 % cream Apply topically to head of penis (retract foreskin) once a day. 2/22/19   Nigel Drake MD   blood glucose test strips (TRUETEST TEST) strip 1 each by In Vitro route daily 8/21/18   Dequan Martínez MD   Lancets MISC 1 each by Does not apply route daily 1/26/16   Sylvester Manzo MD   glucose monitoring kit (FREESTYLE) monitoring kit 1 kit by Does not apply route daily as needed 1/26/16   Sylvester Manzo MD   Insulin Pen Needle (PEN NEEDLES 31GX5/16\") 31G X 8 MM MISC 1 each by Does not apply route 2 times daily. Historical Provider, MD     Allergies  has No Known Allergies. Family History  family history includes Arthritis in his father; Heart Failure in his father; No Known Problems in his maternal grandfather, maternal grandmother, paternal grandfather, and paternal grandmother. Social History   reports that he quit smoking about 27 years ago. His smoking use included cigarettes. He started smoking about 71 years ago. He smoked 2.00 packs per day. He has never used smokeless tobacco.  Up to 2 PPD for 50 years, quit 1991. reports that he does not drink alcohol. reports that he does not use drugs. Marital Status   Occupation retired    OBJECTIVE:   VITALS:  height is 6' 0.5\" (1.842 m) and weight is 177 lb (80.3 kg). His oral temperature is 98 °F (36.7 °C).  His blood pressure is 185/80 (abnormal) and his pulse is 60. His respiration is 20 and oxygen saturation is 98%. CONSTITUTIONAL:alert & oriented x 3, no acute distress. Very pleasant. Slow and antalgic gait. SKIN:  Warm and dry, no rashes   HEAD:  Normocephalic, atraumatic   EYES: PERRL. EOMs intact. Wearing glasses. EARS:  Hearing loss mild. NOSE:  Nares patent. No rhinorrhea   MOUTH/THROAT:  benign  NECK:supple, no lymphadenopathy  LUNGS: Clear to auscultation bilaterally, no wheezes. CARDIOVASCULAR: no murmur. Occasional irregular beat noted. ABDOMEN: soft, non tender, non distended. EXTREMITIES: no edema bilateral lower extremities. Testing:   EKG: in epic  Labs pending: drawn 4/15/2019     IMPRESSIONS:   1. Prostate hypertrophy  2.  has a past medical history of Arthritis, Diabetic neuropathy ([facility] [address] First degree AV block, Full dentures, H/O dizziness, Hematuria (03/2018), Hyperlipidemia, Hypertension, Malignant neoplasm of urinary bladder ([address] (03/2018), RBBB, Tongue cancer ([facility] [address] (11/2014), Type II or unspecified type diabetes mellitus without mention of complication, not stated as uncontrolled, and Wears glasses. PLANS:   1.  Cystoscopy, TURP    SAMANTHA MOTLEY PA-C  Electronically signed 4/15/2019 at 1:41 PM

## 2019-04-15 NOTE — PROGRESS NOTES
Anesthesia Focused Assessment    STOP-BANG Sleep Apnea Questionnaire    SNORE loudly (heard through closed doors)? No  TIRED, fatigued, sleepy during daytime? No  OBSERVED stopping breathing during sleep? No  High blood PRESSURE being treated? Yes    BMI over 35? No  AGE over 48? Yes  NECK circumference over 16\"? Yes  GENDER (male)? Yes             Total 4  High risk 5-8  Intermediate risk 3-4  Low risk 0-2    Obstructive Sleep Apnea: no  If YES, machine used: no     Type 1 DM:   no  T2DM:  yes    Coronary Artery Disease:  no  Hypertension:  yes    Active smoker:  Up to 2 PPD for 50 years, quit 1991. Drinks Alcohol:  no    Dentition: benign    Defib / AICD / Pacemaker: no      Renal Failure/dialysis:  no    Patient was evaluated in PAT & anesthesia guidelines were applied. NPO guidelines, medication instructions and scheduled arrival time were reviewed with patient. Hx of anesthesia complications:  no  Family hx of anesthesia complications:  no                                                                                                                     Anesthesia contacted:   no  Medical or cardiac clearance ordered: updated clearance will be requested from Dr. Kaylyn Diaz.     Becki Ferris PA-C  4/15/19  11:43 AM

## 2019-04-16 LAB
CULTURE: NORMAL
Lab: NORMAL
SPECIMEN DESCRIPTION: NORMAL

## 2019-04-24 RX ORDER — GLIMEPIRIDE 2 MG/1
2 TABLET ORAL
Qty: 90 TABLET | Refills: 1 | Status: SHIPPED | OUTPATIENT
Start: 2019-04-24 | End: 2019-12-02 | Stop reason: SDUPTHER

## 2019-04-24 NOTE — TELEPHONE ENCOUNTER
Medication:  amaryl  Last Refill:  12/2018  Next Appointment:  5/7/2019  Last Appointment:  12/2018  Pharmacy: [facility]    Lab Results   Component Value Date    LABA1C 7.4 (H) 12/19/2018     Lab Results   Component Value Date     12/19/2018

## 2019-04-29 ENCOUNTER — OFFICE VISIT (OUTPATIENT)
Dept: ONCOLOGY | Age: 84
End: 2019-04-29
Payer: MEDICARE

## 2019-04-29 VITALS
SYSTOLIC BLOOD PRESSURE: 144 MMHG | TEMPERATURE: 98.2 F | DIASTOLIC BLOOD PRESSURE: 73 MMHG | WEIGHT: 179 LBS | BODY MASS INDEX: 23.94 KG/M2 | HEART RATE: 58 BPM

## 2019-04-29 DIAGNOSIS — C02.9 TONGUE CANCER (HCC): ICD-10-CM

## 2019-04-29 PROCEDURE — 4040F PNEUMOC VAC/ADMIN/RCVD: CPT | Performed by: INTERNAL MEDICINE

## 2019-04-29 PROCEDURE — 1123F ACP DISCUSS/DSCN MKR DOCD: CPT | Performed by: INTERNAL MEDICINE

## 2019-04-29 PROCEDURE — G8427 DOCREV CUR MEDS BY ELIG CLIN: HCPCS | Performed by: INTERNAL MEDICINE

## 2019-04-29 PROCEDURE — G8420 CALC BMI NORM PARAMETERS: HCPCS | Performed by: INTERNAL MEDICINE

## 2019-04-29 PROCEDURE — 1036F TOBACCO NON-USER: CPT | Performed by: INTERNAL MEDICINE

## 2019-04-29 PROCEDURE — 99214 OFFICE O/P EST MOD 30 MIN: CPT | Performed by: INTERNAL MEDICINE

## 2019-04-29 PROCEDURE — 99211 OFF/OP EST MAY X REQ PHY/QHP: CPT | Performed by: INTERNAL MEDICINE

## 2019-04-29 NOTE — PROGRESS NOTES
_    Chief Complaint   Patient presents with    Follow-up     Review status of disease    Other     pt has either extreme salivation or extreme dry mouth    Other     Bp has been inconsistent        DIAGNOSIS:       Newly diagnosed urinary bladder high-grade urothelial carcinoma. Diagnosed February 2018. 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 Diagnosed late 2014 currently in remission. Chronic tobacco abuse, quit 1991. CURRENT THERAPY:         Radiation therapy along with weekly Erbitux. Treatment completed 1/20/15. S/P BCG bladder instillation by Urology. Discontinued after a few weeks of treatment because of side effects. BRIEF CASE HISTORY:      Mr. Kahlil Henriquez is a very pleasant 80 y.o.  with history of DM and HTN. He had ears pain and headaches for about one year. MRI and carotids were normal. In May 2014, he had dentures done. No clear lesions were seen at that time. Patient started having pain in the tongue over the last two months. He was evaluated by his dentist. Tongue lesion was noted and was referred to ENT. Patient had tongue biopsy which was positive for squamous cell carcinoma. s/p chemoradiation completed 1/20/15. Currently in remission. Patient was in [place] when he developed gross hematuria. He was evaluated locally and he had bladder irrigation and cystoscopy. CT scan of the abdomen showed suspicious bladder tumor and biopsy confirmed high-grade urothelial carcinoma. He was managed by urology. Evaluated by Urology. TURBT showed high grade urothelial carcinoma. No muscle invasion. BCG treatment was attempted but he did not tolerate it. Follow-up cystoscopy showed patient doing fairly well. INTERIM HISTORY:   Patient seen for follow-up bladder cancer and neck cancer. He is clinically stable. No hematuria.   No abdominal drugs.    REVIEW OF SYSTEMS:     · General: No weakness or fatigue. No decreased appetite. No fever or chills. · Eyes: No blurred vision, eye pain or double vision. · Ears: No hearing problems or drainage. No tinnitus. · Throat: No sore throat, problems with swallowing or dysphagia. · Respiratory: No cough, sputum or hemoptysis. No shortness of breath. No pleuritic chest pain. · Cardiovascular: No chest pain, orthopnea or PND. No lower extremity edema. No palpitation. · Gastrointestinal: No problems with swallowing. No abdominal pain or bloating. No nausea or vomiting. No diarrhea or constipation. No GI bleeding. · Genitourinary: As above. · Musculoskeletal: No muscle aches or pains. No limitation of movement. No back pain. No gait disturbance, No joint complaints. · Dermatologic: Acneiform skin rashes No pruritus. No other skin lesions or discolorations. · Psychiatric: No depression, +anxiety, no stress or signs of schizophrenia. No change in mood or affect. · Hematologic: No history of bleeding tendency. No bruises or ecchymosis. No history of clotting problems. · Infectious disease: No fever, chills or frequent infections. · Endocrine: No problems with opacity. No polydipsia or polyuria. No temperature intolerance. · Neurologic: + headaches no dizziness. No weakness or numbness of the extremities. No changes in balance, coordination,  memory, mentation, behavior. · Allergic/Immunologic: No nasal congestion or hives. No repeated infections. PHYSICAL EXAM:  The patient is not in acute distress. Vital signs: Blood pressure (!) 144/73, pulse 58, temperature 98.2 °F (36.8 °C), weight 179 lb (81.2 kg). HEENT:  Eyes are normal. Ears, nose and throat are normal.  Neck: large 3X2 inch lipoma at left supraclavicular area. Rt cervical and submandibular LN enlargement 1 inch in size. No thyroid enlargement. Trachea is centrally located. Chest:  Clear to auscultation.   No wheezes or crepitations. Heart: Regular sinus rhythm. Abdomen: Soft, nontender. No hepatosplenomegaly. No masses. Extremities:  With no edema. Lymph Nodes:  Rt cervical and submandibular LN 1 inch each. No axillary or inguinal lymph node enlargement. Neurologic:  Conscious and oriented. No focal neurological deficits. Psychosocial: No depression, anxiety or stress. Skin: no skin lesions. Review of Diagnostic data:   Tongue biopsy: squamous cell carcinoma. CT neck 10/9/14:  IMPRESSION:    1. Heterogeneously enhancing ulcerative partially necrotic mass in the    right tongue base/tonsillar fossa and invading the extrinsic muscles of    the tongue and extension across the midline lingual frenulum with likely    invasion of the lingual artery and nerve. 2. Level II conglomerate heterogeneously enhancing partially necrotic    lymph nodes with ill-defined borders and also fat planes with the    anterior belly of the sternocleidomastoid muscle consistent with invasion    and extracapsular spread. Heterogeneously enhancing borderline prominent    level III right-sided lymph node      CT neck 2/25/15:  IMPRESSION:    1. Significant interval decrease in size of the previously seen ulcerated    necrotic mass along the right aspect of the tongue base. Small residual    mucosal component however cannot be entirely excluded. Please consider a    direct visual inspection   2. Significant interval decrease in size of the right-sided level II    conglomerate of the cervical lymph nodes since previous examination.          Lab Results   Component Value Date    WBC 10.0 02/23/2019    HGB 14.2 04/15/2019    HCT 46.1 04/15/2019    MCV 94.5 02/23/2019     02/23/2019       Chemistry        Component Value Date/Time     04/15/2019 1202    K 4.4 04/15/2019 1202     04/15/2019 1202    CO2 28 04/15/2019 1202    BUN 17 04/15/2019 1202    CREATININE 0.66 (L) 04/15/2019 1202        Component Value Date/Time    CALCIUM 8.5 (L) 02/23/2019 0613    ALKPHOS 80 02/16/2016 1405    AST 11 02/16/2016 1405    ALT 13 05/18/2018 1300    BILITOT 0.79 02/16/2016 1405        PET/CT scan 5/4/15:  Impression:      Favorable interval response to radiation/chemotherapy. There is only    minimal thickening of right posterior glossal tissue with significant    reduction in glucose metabolic activity, however, SUV still in    intermediate range likely representing postradiation inflammation. Previously noted a small FDG avid right level II jugulodigastric lymph    node showing no abnormal glucose metabolic activity. No abnormal radiotracer activity within the abdominal viscera or    involving axial or appendicular skeletons to suggest metastatic disease    process. CT scan 6/13/16:  IMPRESSION:  No evidence of recurrent neoplasm at the level of the right tongue base.       New, possibly partially visualized, right upper lobe peribronchial thickening with subcentimeter nodularity that may represent mucous plugs is most likely infectious/inflammatory in etiology may be reassessed after treatment to ensure stability to    exclude less likely possibility of neoplastic nodularity. CT scan 12/27/16:     Impression   Redemonstration of posttreatment changes in soft tissue neck region as well   as in the tongue base.  No definite evidence for discrete tongue base   recurrent disease.  Continued follow-up is advised. CT scan 7/6/2017:  Impression   1. Stable appearance of postoperative changes at the tongue base . 2. No findings to suggest metastatic disease within the neck. 3. Ground-glass nodules in the right lung may represent subclinical   infectious/inflammatory etiologies, but other etiologies including metastatic   disease are not excluded.  Short-term follow-up dedicated chest CT is   recommended for further evaluation. 4. Improving sinus disease. 5. Other findings, as above. CT scan 1/2/18:     Impression   1. Postsurgical changes are noted from partial right glossectomy without   evidence of local tumor recurrence or metastatic disease. 2. Large left supraclavicular lipoma, benign, unchanged. 3. Atherosclerotic disease in bilateral carotid bulbs. PET/CT scan 3/19/18:  Impression   1. Hyperdense mass along the right anterolateral bladder wall compatible with   history of malignancy.  Diffuse physiologic bladder activity limits PET   assessment. 2. No evidence of metastatic disease. 3. Postsurgical changes of partial right glossectomy without evidence of   tumor recurrence. 4. Infrarenal abdominal aortic aneurysm measuring up to approximately 3.7 cm,   please see follow-up guidelines below. 5. Right inguinal hernia containing few non dilated bowel loops. 6. Diffusely enlarged prostate gland, please correlate for BPH. 7. Few additional incidental/chronic findings as above.                 TURBT 3/23/18 pathology:  Patient Name: Joyce Kilgore [facility] Rec: [id number]   Path Number: [id number]   Collected: 3/23/2018   Received: 3/23/2018   Reported: 3/26/2018 15:39     -- Diagnosis --   URINARY BLADDER, TURBT:   - PAPILLARY UROTHELIAL CARCINOMA, HIGH-GRADE, WITH FOCAL INVASION INTO   SUBEPITHELIAL CONNECTIVE TISSUE.   - NEGATIVE FOR MUSCULARIS PROPRIA SMOOTH MUSCLE INVASION. -- Diagnosis Comment --   FOR , THE SLIDES WERE REVIEWED BY A SECOND   PATHOLOGIST ([facility]). IMPRESSION:   Newly diagnosed urinary bladder high-grade urothelial carcinoma. Diagnosed February 2018. 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 In remission. Diagnosed late 2014. Chronic tobacco abuse, quit 1991. Anxiety. Controlled  Anorexia. Resolved  Weight loss. Stabilized  Arthritis especially of his small joints of the hands. PLAN:   Patient is doing well clinically. Last evaluation by urology with cystoscopy showed no evidence of malignancy.   He did not tolerate BCG treatment before. He will be seen again by urology May 1, 2019. He will have cystoscopy. If recurrence is confirmed, patient may be treated with the use of Gemzar bladder instillation. In regard to the patient's tongue cancer, overall stable. No evidence of recurrence. Last CT scan was reviewed and discussed with the patient. There is no evidence of recurrence. He will continue follow-up with ENT. Nutrition discussed. Was evaluated by ENT. Negative exam. Continue follow up with ENT. RV 6 months  Patient's questions were answered to the best of his satisfaction and he verbalized full understanding and agreement.

## 2019-05-01 ENCOUNTER — ANESTHESIA (OUTPATIENT)
Dept: OPERATING ROOM | Age: 84
End: 2019-05-01
Payer: MEDICARE

## 2019-05-01 ENCOUNTER — HOSPITAL ENCOUNTER (OUTPATIENT)
Age: 84
Setting detail: OUTPATIENT SURGERY
Discharge: HOME | End: 2019-05-01
Attending: UROLOGY | Admitting: UROLOGY
Payer: MEDICARE

## 2019-05-01 ENCOUNTER — ANESTHESIA EVENT (OUTPATIENT)
Dept: OPERATING ROOM | Age: 84
End: 2019-05-01
Payer: MEDICARE

## 2019-05-01 VITALS — SYSTOLIC BLOOD PRESSURE: 170 MMHG | OXYGEN SATURATION: 100 % | DIASTOLIC BLOOD PRESSURE: 74 MMHG | TEMPERATURE: 96.2 F

## 2019-05-01 VITALS
SYSTOLIC BLOOD PRESSURE: 178 MMHG | BODY MASS INDEX: 23.46 KG/M2 | TEMPERATURE: 97 F | DIASTOLIC BLOOD PRESSURE: 74 MMHG | WEIGHT: 177 LBS | HEIGHT: 73 IN | OXYGEN SATURATION: 100 % | HEART RATE: 70 BPM | RESPIRATION RATE: 14 BRPM

## 2019-05-01 DIAGNOSIS — G89.18 POST-OPERATIVE PAIN: Primary | ICD-10-CM

## 2019-05-01 LAB
GLUCOSE BLD-MCNC: 149 MG/DL (ref 75–110)
GLUCOSE BLD-MCNC: 151 MG/DL (ref 75–110)

## 2019-05-01 PROCEDURE — 2500000003 HC RX 250 WO HCPCS: Performed by: NURSE ANESTHETIST, CERTIFIED REGISTERED

## 2019-05-01 PROCEDURE — 82947 ASSAY GLUCOSE BLOOD QUANT: CPT

## 2019-05-01 PROCEDURE — 87086 URINE CULTURE/COLONY COUNT: CPT

## 2019-05-01 PROCEDURE — 7100000010 HC PHASE II RECOVERY - FIRST 15 MIN: Performed by: UROLOGY

## 2019-05-01 PROCEDURE — 7100000011 HC PHASE II RECOVERY - ADDTL 15 MIN: Performed by: UROLOGY

## 2019-05-01 PROCEDURE — 2709999900 HC NON-CHARGEABLE SUPPLY: Performed by: UROLOGY

## 2019-05-01 PROCEDURE — 6360000002 HC RX W HCPCS: Performed by: UROLOGY

## 2019-05-01 PROCEDURE — 3700000000 HC ANESTHESIA ATTENDED CARE: Performed by: UROLOGY

## 2019-05-01 PROCEDURE — 3700000001 HC ADD 15 MINUTES (ANESTHESIA): Performed by: UROLOGY

## 2019-05-01 PROCEDURE — 7100000000 HC PACU RECOVERY - FIRST 15 MIN: Performed by: UROLOGY

## 2019-05-01 PROCEDURE — 6360000002 HC RX W HCPCS: Performed by: NURSE ANESTHETIST, CERTIFIED REGISTERED

## 2019-05-01 PROCEDURE — 2580000003 HC RX 258: Performed by: ANESTHESIOLOGY

## 2019-05-01 PROCEDURE — 3600000014 HC SURGERY LEVEL 4 ADDTL 15MIN: Performed by: UROLOGY

## 2019-05-01 PROCEDURE — 2720000010 HC SURG SUPPLY STERILE: Performed by: UROLOGY

## 2019-05-01 PROCEDURE — 3600000004 HC SURGERY LEVEL 4 BASE: Performed by: UROLOGY

## 2019-05-01 PROCEDURE — 7100000001 HC PACU RECOVERY - ADDTL 15 MIN: Performed by: UROLOGY

## 2019-05-01 RX ORDER — PROPOFOL 10 MG/ML
INJECTION, EMULSION INTRAVENOUS PRN
Status: DISCONTINUED | OUTPATIENT
Start: 2019-05-01 | End: 2019-05-01 | Stop reason: SDUPTHER

## 2019-05-01 RX ORDER — FENTANYL CITRATE 50 UG/ML
INJECTION, SOLUTION INTRAMUSCULAR; INTRAVENOUS PRN
Status: DISCONTINUED | OUTPATIENT
Start: 2019-05-01 | End: 2019-05-01 | Stop reason: SDUPTHER

## 2019-05-01 RX ORDER — GLYCOPYRROLATE 1 MG/5 ML
SYRINGE (ML) INTRAVENOUS PRN
Status: DISCONTINUED | OUTPATIENT
Start: 2019-05-01 | End: 2019-05-01 | Stop reason: SDUPTHER

## 2019-05-01 RX ORDER — SODIUM CHLORIDE, SODIUM LACTATE, POTASSIUM CHLORIDE, CALCIUM CHLORIDE 600; 310; 30; 20 MG/100ML; MG/100ML; MG/100ML; MG/100ML
1000 INJECTION, SOLUTION INTRAVENOUS CONTINUOUS
Status: DISCONTINUED | OUTPATIENT
Start: 2019-05-01 | End: 2019-05-01 | Stop reason: HOSPADM

## 2019-05-01 RX ORDER — ONDANSETRON 2 MG/ML
INJECTION INTRAMUSCULAR; INTRAVENOUS PRN
Status: DISCONTINUED | OUTPATIENT
Start: 2019-05-01 | End: 2019-05-01 | Stop reason: SDUPTHER

## 2019-05-01 RX ORDER — LIDOCAINE HYDROCHLORIDE 10 MG/ML
INJECTION, SOLUTION EPIDURAL; INFILTRATION; INTRACAUDAL; PERINEURAL PRN
Status: DISCONTINUED | OUTPATIENT
Start: 2019-05-01 | End: 2019-05-01 | Stop reason: SDUPTHER

## 2019-05-01 RX ORDER — FENTANYL CITRATE 50 UG/ML
50 INJECTION, SOLUTION INTRAMUSCULAR; INTRAVENOUS EVERY 5 MIN PRN
Status: DISCONTINUED | OUTPATIENT
Start: 2019-05-01 | End: 2019-05-01 | Stop reason: HOSPADM

## 2019-05-01 RX ORDER — ONDANSETRON 2 MG/ML
4 INJECTION INTRAMUSCULAR; INTRAVENOUS
Status: DISCONTINUED | OUTPATIENT
Start: 2019-05-01 | End: 2019-05-01 | Stop reason: HOSPADM

## 2019-05-01 RX ORDER — HYDROCODONE BITARTRATE AND ACETAMINOPHEN 5; 325 MG/1; MG/1
1 TABLET ORAL EVERY 6 HOURS PRN
Qty: 20 TABLET | Refills: 0 | Status: ON HOLD | OUTPATIENT
Start: 2019-05-01 | End: 2019-05-06 | Stop reason: SDUPTHER

## 2019-05-01 RX ORDER — CIPROFLOXACIN 500 MG/1
500 TABLET, FILM COATED ORAL 2 TIMES DAILY
Qty: 6 TABLET | Refills: 0 | Status: ON HOLD | OUTPATIENT
Start: 2019-05-01 | End: 2019-05-04

## 2019-05-01 RX ORDER — DEXAMETHASONE SODIUM PHOSPHATE 10 MG/ML
INJECTION INTRAMUSCULAR; INTRAVENOUS PRN
Status: DISCONTINUED | OUTPATIENT
Start: 2019-05-01 | End: 2019-05-01 | Stop reason: SDUPTHER

## 2019-05-01 RX ORDER — FENTANYL CITRATE 50 UG/ML
25 INJECTION, SOLUTION INTRAMUSCULAR; INTRAVENOUS EVERY 5 MIN PRN
Status: DISCONTINUED | OUTPATIENT
Start: 2019-05-01 | End: 2019-05-01 | Stop reason: HOSPADM

## 2019-05-01 RX ADMIN — FENTANYL CITRATE 25 MCG: 50 INJECTION INTRAMUSCULAR; INTRAVENOUS at 08:45

## 2019-05-01 RX ADMIN — Medication 0.2 MG: at 08:14

## 2019-05-01 RX ADMIN — FENTANYL CITRATE 25 MCG: 50 INJECTION INTRAMUSCULAR; INTRAVENOUS at 08:47

## 2019-05-01 RX ADMIN — DEXAMETHASONE SODIUM PHOSPHATE 10 MG: 10 INJECTION INTRAMUSCULAR; INTRAVENOUS at 08:04

## 2019-05-01 RX ADMIN — Medication 2 G: at 08:12

## 2019-05-01 RX ADMIN — FENTANYL CITRATE 50 MCG: 50 INJECTION INTRAMUSCULAR; INTRAVENOUS at 08:23

## 2019-05-01 RX ADMIN — SODIUM CHLORIDE, POTASSIUM CHLORIDE, SODIUM LACTATE AND CALCIUM CHLORIDE 1000 ML: 600; 310; 30; 20 INJECTION, SOLUTION INTRAVENOUS at 07:00

## 2019-05-01 RX ADMIN — ONDANSETRON 4 MG: 2 INJECTION, SOLUTION INTRAMUSCULAR; INTRAVENOUS at 08:41

## 2019-05-01 RX ADMIN — PROPOFOL 100 MG: 10 INJECTION, EMULSION INTRAVENOUS at 08:00

## 2019-05-01 RX ADMIN — LIDOCAINE HYDROCHLORIDE 40 MG: 10 INJECTION, SOLUTION EPIDURAL; INFILTRATION; INTRACAUDAL; PERINEURAL at 08:00

## 2019-05-01 ASSESSMENT — PAIN SCALES - GENERAL
PAINLEVEL_OUTOF10: 2
PAINLEVEL_OUTOF10: 1
PAINLEVEL_OUTOF10: 2
PAINLEVEL_OUTOF10: 1

## 2019-05-01 ASSESSMENT — PAIN DESCRIPTION - LOCATION: LOCATION: PENIS

## 2019-05-01 ASSESSMENT — PULMONARY FUNCTION TESTS
PIF_VALUE: 4
PIF_VALUE: 2
PIF_VALUE: 1
PIF_VALUE: 2
PIF_VALUE: 1
PIF_VALUE: 2
PIF_VALUE: 3
PIF_VALUE: 2
PIF_VALUE: 3
PIF_VALUE: 2
PIF_VALUE: 1
PIF_VALUE: 2
PIF_VALUE: 1
PIF_VALUE: 2
PIF_VALUE: 1
PIF_VALUE: 2
PIF_VALUE: 1
PIF_VALUE: 2
PIF_VALUE: 2
PIF_VALUE: 0
PIF_VALUE: 2
PIF_VALUE: 24
PIF_VALUE: 2
PIF_VALUE: 2
PIF_VALUE: 1
PIF_VALUE: 2
PIF_VALUE: 2
PIF_VALUE: 1
PIF_VALUE: 2
PIF_VALUE: 1
PIF_VALUE: 2
PIF_VALUE: 1
PIF_VALUE: 2
PIF_VALUE: 1
PIF_VALUE: 3
PIF_VALUE: 3
PIF_VALUE: 0
PIF_VALUE: 2

## 2019-05-01 ASSESSMENT — PAIN - FUNCTIONAL ASSESSMENT: PAIN_FUNCTIONAL_ASSESSMENT: 0-10

## 2019-05-01 ASSESSMENT — ENCOUNTER SYMPTOMS
STRIDOR: 0
SHORTNESS OF BREATH: 0

## 2019-05-01 ASSESSMENT — PAIN DESCRIPTION - DESCRIPTORS: DESCRIPTORS: BURNING

## 2019-05-01 ASSESSMENT — PAIN DESCRIPTION - PAIN TYPE: TYPE: SURGICAL PAIN

## 2019-05-01 NOTE — OP NOTE
GreenLight photovaporization was initiated beginning with the median lobe. After the median lobe was taken down we then turned our attention to the patient's left lateral lobe. Beginning at the bladder neck and working distally GreenLight photovaporization was performed. The process was then repeated with the right lobe of the prostate. Finally we turned our attention to the apical tissue. While using the verumontanum for a marker for the external urinary sphincter, apical prostate tissue was resected using the GreenLight laser. Extensive photovaporization of the prostate was performed. After incising the bladder neck with our laser, the prostatic urethra appeared to be wide open and no longer obstructed. No significant bleeding was noted at the conclusion of the procedure. Both ureteral orifices were noted to be uninvolved in the resection. There was no scatter of laser fiber into the bladder. We did not go distal to the verumontanum. We worked at 80 finch of power at the bladder neck and near the apex of the prostate. The scope was removed and a 22-Japanese 3-way Gruber catheter was inserted and irrigated to confirm position. Continuous bladder irrigation with normal saline was then initiated and 3 L of normal saline were allowed to infuse before the catheter was clamped. The patient was awoken and transferred to PACU. All instruments and equipment were accounted for at the end of the procedure. Dr. Stanley was present and scrubbed for the entire procedure.     DISPOSITION:  The patient was transferred to PACU in good condition. He will be discharged home from the hospital per the PACU team. Appropriate follow up will be arranged.

## 2019-05-01 NOTE — PROGRESS NOTES
Spoke with Dr. Marco Edmond concerning elevated blood  Pressures. No new orders. Patient approved for phase 2 when ready.

## 2019-05-01 NOTE — PROGRESS NOTES
Discharge  Instructions including curtis catheter care and prescriptions (Norco and Ciprofloxacin) reviewed with patient and wife. Both acknowledged understanding.

## 2019-05-01 NOTE — ANESTHESIA POSTPROCEDURE EVALUATION
Department of Anesthesiology  Postprocedure Note    Patient: Paco Quan  MRN: [id number]  YOB: 1932  Date of evaluation: 5/1/2019  Time:  9:29 AM     Procedure Summary     Date:  05/01/19 Room / Location:  [facility] OR  / [facility] OR    Anesthesia Start:  8906 Anesthesia Stop:  0906    Procedure:  CYSTOSCOPY, TUR-PROSTATE GREENLIGHT XPS LASER (N/A Urethra) Diagnosis:  (BLADDER CANCER, GROSS HEMATURIA)    Surgeon:  Santi Rodríguez MD Responsible Provider:  Hugo Chahal MD    Anesthesia Type:  general ASA Status:  3          Anesthesia Type: general    Neena Phase I: Neena Score: 10    Neena Phase II:      Last vitals: Reviewed and per EMR flowsheets.        Anesthesia Post Evaluation    Patient location during evaluation: PACU  Patient participation: complete - patient participated  Level of consciousness: awake and alert  Pain score: 2  Airway patency: patent  Nausea & Vomiting: no nausea and no vomiting  Complications: no  Cardiovascular status: hemodynamically stable  Respiratory status: acceptable, nasal cannula and room air  Hydration status: stable

## 2019-05-01 NOTE — ANESTHESIA PRE PROCEDURE
Department of Anesthesiology  Preprocedure Note       Name:  Vee Mart   Age:  80 y.o.  :  1932                                          MRN:  [id number]         Date:  2019      Surgeon: Temitope Abernathy):  Josefina Funez MD    Procedure: Benedicto Mujica XPS LASER Kaela Bhavya #[id number] MOISES) ; POSSIBLE TUR-BLADDER TUMOR  GYRUS (N/A )    Medications prior to admission:   Prior to Admission medications    Medication Sig Start Date End Date Taking? Authorizing Provider   glimepiride (AMARYL) 2 MG tablet Take 1 tablet by mouth every morning (before breakfast) 19  Jenifer Rea MD   aspirin 81 MG tablet Take 81 mg by mouth daily Stopped for procedure on 19    Historical Provider, MD   Multiple Vitamins-Minerals (CENTRUM SILVER PO) Take 1 tablet by mouth daily    Historical Provider, MD   clotrimazole-betamethasone (LOTRISONE) 1-0.05 % cream Apply topically to head of penis (retract foreskin) once a day.  19   Beka Palacios MD   Ascorbic Acid (VITAMIN C) 500 MG CHEW Take 500 mg by mouth daily    Historical Provider, MD   simvastatin (ZOCOR) 20 MG tablet TAKE ONE TABLET BY MOUTH ONCE DAILY 10/2/18   Jenifer Rae MD   blood glucose test strips (TRUETEST TEST) strip 1 each by In Vitro route daily 18   Jenifer Rae MD   tamsulosin (FLOMAX) 0.4 MG capsule TAKE 1 CAPSULE BY MOUTH ONCE DAILY WITH  SUPPER 18   Josefina Funez MD   methotrexate (RHEUMATREX) 2.5 MG chemo tablet TK 6 TS PO ONCE A WEEK 18   Historical Provider, MD   folic acid (FOLVITE) 1 MG tablet Take 1 mg by mouth Daily with supper    Historical Provider, MD   lisinopril (PRINIVIL;ZESTRIL) 5 MG tablet Take 5 mg by mouth Daily with supper     Historical Provider, MD   Lancets MISC 1 each by Does not apply route daily 16   Francy Kamara MD   glucose monitoring kit (FREESTYLE) monitoring kit 1 kit by Does not apply route daily as needed 16   Francy Kamara MD   Insulin Pen Needle (PEN NEEDLES 31GX5/16\") 31G X 8 MM MISC 1 each by Does not apply route 2 times daily. Historical Provider, MD       Current medications:    No current facility-administered medications for this visit. No current outpatient medications on file.      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.30    Diabetic polyneuropathy ([facility] [address] E11.42    Disorder of arteries and arterioles (HCC) I77.9    Memory loss R41.3    Tongue cancer ([facility] [address] C02.9    Anorexia R63.0    Weight loss R63.4    Anxiety F41.9    Rash R21    Oral mucositis K12.30    Bradycardia R00.1    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    History of tobacco abuse Z87.891    Rheumatoid arteritis I00    Slow transit constipation K59.01    Orthostatic hypotension I95.1    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) C67.1    Right inguinal hernia K40.90    Bladder cancer ([facility] [address] C67.9       Past Medical History:        Diagnosis Date    Arthritis     Diabetic neuropathy ([address]     First degree AV block     Full dentures     Upper & Lower    H/O dizziness     [facility] neurology    Hematuria 03/2018    Hyperlipidemia     Hypertension     Malignant neoplasm of urinary bladder ([address] 03/2018    RBBB     Dr. Talat Luo Tongue cancer [facility]) 11/2014    mets to lymph nodes, CHEMO AND RADIATION    Type II or unspecified type diabetes mellitus without mention of complication, not stated as uncontrolled     on Insulin    Wears glasses        Past Surgical History:        Procedure Laterality Date    APPENDECTOMY  's    BRONCHOSCOPY      CHOLECYSTECTOMY  1981    CLAVICLE SURGERY Right 194    pins placed and removed    COLONOSCOPY      several polypectomy    CYSTOSCOPY  2018    CYSTOSCOPY  2019    turbt    CYSTOSCOPY N/A 2019    CYSTOSCOPY, TUR-BLADDER TUMOR, GYRUS , URETHRAL DILATATION performed by Santi Rodríguez MD at [facility], COLON, [address]  2014    with EGD;  removed    [facility] OFFICE[facility]OUTPT VISIT,PROCEDURE ONLY N/A 3/23/2018    CYSTOSCOPY, TUR BLADDER TUMOR WITH GYRUS performed by Santi Rodríguez MD at [address] [facility]  2014    chemo Tx.  TONSILLECTOMY      TUNNELED VENOUS PORT PLACEMENT Left 14    chemo port insertion; left chest    TURP      UMBILICAL HERNIA REPAIR      umbilical repair       Social History:    Social History     Tobacco Use    Smoking status: Former Smoker     Packs/day: 2.00     Types: Cigarettes     Start date:      Last attempt to quit: 1991     Years since quittin.4    Smokeless tobacco: Never Used   Substance Use Topics    Alcohol use: No     Alcohol/week: 0.0 oz                                Counseling given: Not Answered      Vital Signs (Current): There were no vitals filed for this visit.                                            BP Readings from Last 3 Encounters:   19 (!) 144/73   04/15/19 (!) 185/80   19 (!) 180/72       NPO Status:                                                                                 BMI:   Wt Readings from Last 3 Encounters:   19 177 lb (80.3 kg)   19 179 lb (81.2 kg)   04/15/19 177 lb (80.3 kg)     There is no height or weight on file to calculate BMI.    CBC:   Lab Results   Component Value Date    WBC 10.0 2019    RBC 3.81 2019    HGB 14.2 04/15/2019    HCT 46.1 04/15/2019    MCV 94.5 02/23/2019    RDW 16.8 02/23/2019     02/23/2019       CMP:   Lab Results   Component Value Date     04/15/2019    K 4.4 04/15/2019     04/15/2019    CO2 28 04/15/2019    BUN 17 04/15/2019    CREATININE 0.66 04/15/2019    GFRAA >60 04/15/2019    LABGLOM >60 04/15/2019    GLUCOSE 87 04/15/2019    PROT 6.6 02/16/2016    CALCIUM 8.5 02/23/2019    BILITOT 0.79 02/16/2016    ALKPHOS 80 02/16/2016    AST 11 02/16/2016    ALT 13 05/18/2018       POC Tests:   No results for input(s): POCGLU, POCNA, POCK, POCCL, POCBUN, POCHEMO, POCHCT in the last 72 hours. Coags:   Lab Results   Component Value Date    PROTIME 10.9 11/17/2014    INR 1.1 11/17/2014    APTT 25.7 11/17/2014       HCG (If Applicable): No results found for: PREGTESTUR, PREGSERUM, HCG, HCGQUANT     ABGs: No results found for: PHART, PO2ART, HQU9SLY, PMG8FMD, BEART, F5GVDXNC     Type & Screen (If Applicable):  No results found for: LABABO, [address]    Anesthesia Evaluation  Patient summary reviewed no history of anesthetic complications:   Airway: Mallampati: III     Neck ROM: full   Dental:    (+) upper dentures and lower dentures      Pulmonary:normal exam        (-) COPD, asthma, shortness of breath, sleep apnea and stridor                           Cardiovascular:    (+) hypertension:, CHF:, hyperlipidemia    (-) pacemaker, CABG/stent and  angina    ECG reviewed    Rate: normal  Echocardiogram reviewed         Beta Blocker:  Not on Beta Blocker         Neuro/Psych:   (+) neuromuscular disease:,    (-) seizures, TIA and CVA           GI/Hepatic/Renal:        (-) GERD, liver disease and no renal disease       Endo/Other:    (+) DiabetesType II DM, no interval change, , malignancy/cancer (bladder). (-) hypothyroidism, hyperthyroidism, blood dyscrasia        Pt had PAT visit. ROS comment: Bladder cancer.  Abdominal:           Vascular:                                   Narrative     Sinus rhythm with 1st degree A-V block with Premature atrial complexes with Aberrant conduction  Right bundle branch block  Abnormal ECG  No previous ECGs available   Lab and Collection     EKG 12 Lead on 2/12/2019     ----      Aortic Peak Velocity (range:  1.00 - 1.70) Value: 2.50 m/s  Aortic Peak Gradient Value: 10 mmHg  Aortic Valve Insufficiency: trace  Mitral Valve Insufficiency: mild  Tricuspid Valve Insufficiency: mild  Pulmonic Valve Insufficiency: trace  Tissue Doppler 6.0  E/A  Ratio    1.06    -----------------------------------------------------------------  ----   Jackson Sarai    Normal LV size:     no    Dilated Left Ventricle: yes    LV wall motion: abnormal    Left ventricular hypertrophy: yes    Left ventricular systolic function: mild       Left ventricular diastolic function: Mild impairment     LVEF:  Mildly reduced 40-50%    ATRIAL ENLARGEMENT:  bilateral    Mitral Valve: opening adequately                                             Aortic Valve:  Calcified    opening is mildly restricted     Aortic Stenosis:  None seen     Tech Comments: No previous for comparison. Structures and Valves  are adequately visualized. ADDITIONAL COMMENTS:       Specimen Collected: 10/20/15               Anesthesia Plan      general     ASA 3     (Previous LMA 5    )  Induction: intravenous. MIPS: Postoperative opioids intended. Anesthetic plan and risks discussed with patient (male and female ). Plan discussed with CRNA.                   Karl Weiss MD   5/1/2019

## 2019-05-02 ENCOUNTER — PROCEDURE VISIT (OUTPATIENT)
Dept: UROLOGY | Age: 84
End: 2019-05-02

## 2019-05-02 VITALS
WEIGHT: 177.03 LBS | HEART RATE: 69 BPM | DIASTOLIC BLOOD PRESSURE: 80 MMHG | OXYGEN SATURATION: 94 % | HEIGHT: 73 IN | BODY MASS INDEX: 23.46 KG/M2 | TEMPERATURE: 98.2 F | SYSTOLIC BLOOD PRESSURE: 120 MMHG

## 2019-05-02 DIAGNOSIS — Z98.890 POST-OPERATIVE STATE: Primary | ICD-10-CM

## 2019-05-02 LAB
CULTURE: NO GROWTH
Lab: NORMAL
SPECIMEN DESCRIPTION: NORMAL

## 2019-05-02 PROCEDURE — 99999 PR OFFICE/OUTPT VISIT,PROCEDURE ONLY: CPT | Performed by: NURSE PRACTITIONER

## 2019-05-03 ENCOUNTER — TELEPHONE (OUTPATIENT)
Dept: UROLOGY | Age: 84
End: 2019-05-03

## 2019-05-03 ENCOUNTER — HOSPITAL ENCOUNTER (INPATIENT)
Age: 84
LOS: 3 days | Discharge: NURSING, REHAB OR LONG-TERM CARE | DRG: 689 | End: 2019-05-06
Attending: EMERGENCY MEDICINE | Admitting: INTERNAL MEDICINE
Payer: MEDICARE

## 2019-05-03 DIAGNOSIS — G89.18 POST-OPERATIVE PAIN: ICD-10-CM

## 2019-05-03 DIAGNOSIS — A41.9 SEPTICEMIA (HCC): ICD-10-CM

## 2019-05-03 DIAGNOSIS — R31.0 GROSS HEMATURIA: ICD-10-CM

## 2019-05-03 DIAGNOSIS — R33.9 URINARY RETENTION: Primary | ICD-10-CM

## 2019-05-03 PROBLEM — N30.91 HEMATURIA DUE TO CYSTITIS: Status: ACTIVE | Noted: 2019-05-03

## 2019-05-03 LAB
-: ABNORMAL
ABO/RH: NORMAL
ABSOLUTE EOS #: 0 K/UL (ref 0–0.4)
ABSOLUTE IMMATURE GRANULOCYTE: ABNORMAL K/UL (ref 0–0.3)
ABSOLUTE LYMPH #: 0.31 K/UL (ref 1–4.8)
ABSOLUTE MONO #: 0.62 K/UL (ref 0.1–1.3)
ALBUMIN SERPL-MCNC: 4.1 G/DL (ref 3.5–5.2)
ALBUMIN/GLOBULIN RATIO: ABNORMAL (ref 1–2.5)
ALP BLD-CCNC: 67 U/L (ref 40–129)
ALT SERPL-CCNC: 15 U/L (ref 5–41)
AMORPHOUS: ABNORMAL
ANION GAP SERPL CALCULATED.3IONS-SCNC: 14 MMOL/L (ref 9–17)
ANTIBODY SCREEN: NEGATIVE
ARM BAND NUMBER: NORMAL
AST SERPL-CCNC: 23 U/L
BACTERIA: ABNORMAL
BASOPHILS # BLD: 0 % (ref 0–2)
BASOPHILS ABSOLUTE: 0 K/UL (ref 0–0.2)
BILIRUB SERPL-MCNC: 1.64 MG/DL (ref 0.3–1.2)
BILIRUBIN URINE: ABNORMAL
BUN BLDV-MCNC: 31 MG/DL (ref 8–23)
BUN/CREAT BLD: ABNORMAL (ref 9–20)
CALCIUM SERPL-MCNC: 8.8 MG/DL (ref 8.6–10.4)
CASTS UA: ABNORMAL /LPF
CHLORIDE BLD-SCNC: 98 MMOL/L (ref 98–107)
CO2: 26 MMOL/L (ref 20–31)
COLOR: ABNORMAL
COMMENT UA: ABNORMAL
CREAT SERPL-MCNC: 0.83 MG/DL (ref 0.7–1.2)
CRYSTALS, UA: ABNORMAL /HPF
DIFFERENTIAL TYPE: ABNORMAL
EOSINOPHILS RELATIVE PERCENT: 0 % (ref 0–4)
EPITHELIAL CELLS UA: ABNORMAL /HPF
EXPIRATION DATE: NORMAL
GFR AFRICAN AMERICAN: >60 ML/MIN
GFR NON-AFRICAN AMERICAN: >60 ML/MIN
GFR SERPL CREATININE-BSD FRML MDRD: ABNORMAL ML/MIN/{1.73_M2}
GFR SERPL CREATININE-BSD FRML MDRD: ABNORMAL ML/MIN/{1.73_M2}
GLUCOSE BLD-MCNC: 252 MG/DL (ref 75–110)
GLUCOSE BLD-MCNC: 306 MG/DL (ref 70–99)
GLUCOSE URINE: ABNORMAL
HCT VFR BLD CALC: 39 % (ref 41–53)
HEMOGLOBIN: 12.6 G/DL (ref 13.5–17.5)
IMMATURE GRANULOCYTES: ABNORMAL %
INR BLD: 1
KETONES, URINE: ABNORMAL
LACTIC ACID: 1.6 MMOL/L (ref 0.5–2.2)
LEUKOCYTE ESTERASE, URINE: ABNORMAL
LYMPHOCYTES # BLD: 2 % (ref 24–44)
MCH RBC QN AUTO: 29.7 PG (ref 26–34)
MCHC RBC AUTO-ENTMCNC: 32.3 G/DL (ref 31–37)
MCV RBC AUTO: 91.9 FL (ref 80–100)
MONOCYTES # BLD: 4 % (ref 1–7)
MORPHOLOGY: ABNORMAL
MUCUS: ABNORMAL
NITRITE, URINE: NEGATIVE
NRBC AUTOMATED: ABNORMAL PER 100 WBC
OTHER OBSERVATIONS UA: ABNORMAL
PDW BLD-RTO: 16.3 % (ref 11.5–14.9)
PH UA: 6 (ref 5–8)
PLATELET # BLD: 248 K/UL (ref 150–450)
PLATELET ESTIMATE: ABNORMAL
PMV BLD AUTO: 11.2 FL (ref 6–12)
POTASSIUM SERPL-SCNC: 4.8 MMOL/L (ref 3.7–5.3)
PROTEIN UA: ABNORMAL
PROTHROMBIN TIME: 13.6 SEC (ref 11.8–14.6)
RBC # BLD: 4.25 M/UL (ref 4.5–5.9)
RBC # BLD: ABNORMAL 10*6/UL
RBC UA: ABNORMAL /HPF
RENAL EPITHELIAL, UA: ABNORMAL /HPF
SEG NEUTROPHILS: 94 % (ref 36–66)
SEGMENTED NEUTROPHILS ABSOLUTE COUNT: 14.57 K/UL (ref 1.3–9.1)
SODIUM BLD-SCNC: 138 MMOL/L (ref 135–144)
SPECIFIC GRAVITY UA: 1.02 (ref 1–1.03)
TOTAL PROTEIN: 6.5 G/DL (ref 6.4–8.3)
TRICHOMONAS: ABNORMAL
TURBIDITY: ABNORMAL
URINE HGB: ABNORMAL
UROBILINOGEN, URINE: NORMAL
WBC # BLD: 15.5 K/UL (ref 3.5–11)
WBC # BLD: ABNORMAL 10*3/UL
WBC UA: ABNORMAL /HPF
YEAST: ABNORMAL

## 2019-05-03 PROCEDURE — 2580000003 HC RX 258: Performed by: EMERGENCY MEDICINE

## 2019-05-03 PROCEDURE — 82947 ASSAY GLUCOSE BLOOD QUANT: CPT

## 2019-05-03 PROCEDURE — 85018 HEMOGLOBIN: CPT

## 2019-05-03 PROCEDURE — 80053 COMPREHEN METABOLIC PANEL: CPT

## 2019-05-03 PROCEDURE — 36415 COLL VENOUS BLD VENIPUNCTURE: CPT

## 2019-05-03 PROCEDURE — 85610 PROTHROMBIN TIME: CPT

## 2019-05-03 PROCEDURE — 6370000000 HC RX 637 (ALT 250 FOR IP): Performed by: INTERNAL MEDICINE

## 2019-05-03 PROCEDURE — 83605 ASSAY OF LACTIC ACID: CPT

## 2019-05-03 PROCEDURE — 96365 THER/PROPH/DIAG IV INF INIT: CPT

## 2019-05-03 PROCEDURE — 86900 BLOOD TYPING SEROLOGIC ABO: CPT

## 2019-05-03 PROCEDURE — 6360000002 HC RX W HCPCS: Performed by: EMERGENCY MEDICINE

## 2019-05-03 PROCEDURE — 87086 URINE CULTURE/COLONY COUNT: CPT

## 2019-05-03 PROCEDURE — 99284 EMERGENCY DEPT VISIT MOD MDM: CPT

## 2019-05-03 PROCEDURE — 86901 BLOOD TYPING SEROLOGIC RH(D): CPT

## 2019-05-03 PROCEDURE — 51702 INSERT TEMP BLADDER CATH: CPT

## 2019-05-03 PROCEDURE — 85025 COMPLETE CBC W/AUTO DIFF WBC: CPT

## 2019-05-03 PROCEDURE — 1200000000 HC SEMI PRIVATE

## 2019-05-03 PROCEDURE — 86850 RBC ANTIBODY SCREEN: CPT

## 2019-05-03 PROCEDURE — 85014 HEMATOCRIT: CPT

## 2019-05-03 PROCEDURE — 81001 URINALYSIS AUTO W/SCOPE: CPT

## 2019-05-03 PROCEDURE — 2580000003 HC RX 258: Performed by: INTERNAL MEDICINE

## 2019-05-03 PROCEDURE — 87040 BLOOD CULTURE FOR BACTERIA: CPT

## 2019-05-03 RX ORDER — SODIUM CHLORIDE 0.9 % (FLUSH) 0.9 %
10 SYRINGE (ML) INJECTION PRN
Status: DISCONTINUED | OUTPATIENT
Start: 2019-05-03 | End: 2019-05-06 | Stop reason: HOSPADM

## 2019-05-03 RX ORDER — ACETAMINOPHEN 325 MG/1
650 TABLET ORAL EVERY 4 HOURS PRN
Status: DISCONTINUED | OUTPATIENT
Start: 2019-05-03 | End: 2019-05-06 | Stop reason: HOSPADM

## 2019-05-03 RX ORDER — LIDOCAINE HYDROCHLORIDE 20 MG/ML
JELLY TOPICAL
Status: DISPENSED
Start: 2019-05-03 | End: 2019-05-04

## 2019-05-03 RX ORDER — LISINOPRIL 5 MG/1
5 TABLET ORAL
Status: DISCONTINUED | OUTPATIENT
Start: 2019-05-03 | End: 2019-05-06 | Stop reason: HOSPADM

## 2019-05-03 RX ORDER — CLOTRIMAZOLE AND BETAMETHASONE DIPROPIONATE 10; .64 MG/G; MG/G
CREAM TOPICAL 2 TIMES DAILY PRN
Status: DISCONTINUED | OUTPATIENT
Start: 2019-05-03 | End: 2019-05-06 | Stop reason: HOSPADM

## 2019-05-03 RX ORDER — SODIUM CHLORIDE 9 MG/ML
INJECTION, SOLUTION INTRAVENOUS CONTINUOUS
Status: DISCONTINUED | OUTPATIENT
Start: 2019-05-03 | End: 2019-05-06 | Stop reason: HOSPADM

## 2019-05-03 RX ORDER — SIMVASTATIN 20 MG
20 TABLET ORAL NIGHTLY
Status: DISCONTINUED | OUTPATIENT
Start: 2019-05-03 | End: 2019-05-06 | Stop reason: HOSPADM

## 2019-05-03 RX ORDER — TAMSULOSIN HYDROCHLORIDE 0.4 MG/1
0.4 CAPSULE ORAL DAILY
Status: DISCONTINUED | OUTPATIENT
Start: 2019-05-04 | End: 2019-05-06 | Stop reason: HOSPADM

## 2019-05-03 RX ORDER — SODIUM CHLORIDE 0.9 % (FLUSH) 0.9 %
10 SYRINGE (ML) INJECTION EVERY 12 HOURS SCHEDULED
Status: DISCONTINUED | OUTPATIENT
Start: 2019-05-03 | End: 2019-05-06 | Stop reason: HOSPADM

## 2019-05-03 RX ORDER — FOLIC ACID 1 MG/1
1 TABLET ORAL
Status: DISCONTINUED | OUTPATIENT
Start: 2019-05-03 | End: 2019-05-06 | Stop reason: HOSPADM

## 2019-05-03 RX ORDER — LIDOCAINE HYDROCHLORIDE 20 MG/ML
JELLY TOPICAL ONCE
Status: DISCONTINUED | OUTPATIENT
Start: 2019-05-03 | End: 2019-05-03

## 2019-05-03 RX ADMIN — SODIUM CHLORIDE: 9 INJECTION, SOLUTION INTRAVENOUS at 19:34

## 2019-05-03 RX ADMIN — SODIUM CHLORIDE 125 ML/HR: 9 INJECTION, SOLUTION INTRAVENOUS at 16:29

## 2019-05-03 RX ADMIN — CEFTRIAXONE SODIUM 1 G: 1 INJECTION, POWDER, FOR SOLUTION INTRAMUSCULAR; INTRAVENOUS at 16:30

## 2019-05-03 RX ADMIN — LISINOPRIL 5 MG: 5 TABLET ORAL at 22:04

## 2019-05-03 RX ADMIN — SIMVASTATIN 20 MG: 20 TABLET, FILM COATED ORAL at 22:03

## 2019-05-03 RX ADMIN — INSULIN LISPRO 2 UNITS: 100 INJECTION, SOLUTION INTRAVENOUS; SUBCUTANEOUS at 22:03

## 2019-05-03 RX ADMIN — FOLIC ACID 1 MG: 1 TABLET ORAL at 22:04

## 2019-05-03 ASSESSMENT — PAIN DESCRIPTION - PAIN TYPE: TYPE: CHRONIC PAIN;ACUTE PAIN

## 2019-05-03 ASSESSMENT — ENCOUNTER SYMPTOMS: ABDOMINAL PAIN: 1

## 2019-05-03 ASSESSMENT — PAIN DESCRIPTION - DESCRIPTORS: DESCRIPTORS: BURNING;CONSTANT

## 2019-05-03 ASSESSMENT — PAIN DESCRIPTION - LOCATION: LOCATION: ABDOMEN;PENIS;PELVIS

## 2019-05-03 ASSESSMENT — PAIN SCALES - GENERAL: PAINLEVEL_OUTOF10: 9

## 2019-05-03 NOTE — TELEPHONE ENCOUNTER
Pt's wife called stating that he has urinated very little since catheter was removed in the office yesterday s/p betzy on 5/1/19. Writer advised her to take him to the ER since there is not a provider in the office this afternoon. She verbalized understanding.

## 2019-05-03 NOTE — PROGRESS NOTES
Patient arrived via stretcher to room 2066 with belongings. Patient alert and oriented, white board updated, vitals stable, patient reports no pain. Irrigation to curtis running with urine in curtis bag a light pink. Will continue to monitor.

## 2019-05-03 NOTE — ED NOTES
Report given to , RN from [address] [place] [address] Ln phone   The following was reviewed with receiving RN:   Current vital signs:  BP (!) 162/78   Pulse 76   Temp 98 °F (36.7 °C) (Oral)   Resp 16   Ht 6' 0.5\" (1.842 m)   Wt 177 lb (80.3 kg)   SpO2 97%   BMI 23.68 kg/m²                MEWS Score: 1     Any medication or safety alerts were reviewed. Any pending diagnostics and notifications were also reviewed, as well as any safety concerns or issues, abnormal labs, abnormal imaging, and abnormal assessment findings. Questions were answered.           Zan Reis RN  05/03/19 7219       Zan Reis RN  05/03/19 1747

## 2019-05-04 PROBLEM — E43 SEVERE MALNUTRITION (HCC): Status: ACTIVE | Noted: 2019-05-04

## 2019-05-04 LAB
CULTURE: NO GROWTH
GLUCOSE BLD-MCNC: 138 MG/DL (ref 75–110)
GLUCOSE BLD-MCNC: 189 MG/DL (ref 75–110)
GLUCOSE BLD-MCNC: 209 MG/DL (ref 75–110)
GLUCOSE BLD-MCNC: 90 MG/DL (ref 75–110)
HCT VFR BLD CALC: 34.3 % (ref 41–53)
HCT VFR BLD CALC: 34.5 % (ref 41–53)
HCT VFR BLD CALC: 34.7 % (ref 41–53)
HCT VFR BLD CALC: 38.5 % (ref 41–53)
HEMOGLOBIN: 11.2 G/DL (ref 13.5–17.5)
HEMOGLOBIN: 11.4 G/DL (ref 13.5–17.5)
HEMOGLOBIN: 11.4 G/DL (ref 13.5–17.5)
HEMOGLOBIN: 12.7 G/DL (ref 13.5–17.5)
Lab: NORMAL
SPECIMEN DESCRIPTION: NORMAL

## 2019-05-04 PROCEDURE — 6370000000 HC RX 637 (ALT 250 FOR IP): Performed by: INTERNAL MEDICINE

## 2019-05-04 PROCEDURE — 82947 ASSAY GLUCOSE BLOOD QUANT: CPT

## 2019-05-04 PROCEDURE — 2580000003 HC RX 258: Performed by: INTERNAL MEDICINE

## 2019-05-04 PROCEDURE — 1200000000 HC SEMI PRIVATE

## 2019-05-04 PROCEDURE — 6370000000 HC RX 637 (ALT 250 FOR IP): Performed by: UROLOGY

## 2019-05-04 PROCEDURE — 6360000002 HC RX W HCPCS: Performed by: INTERNAL MEDICINE

## 2019-05-04 PROCEDURE — 99223 1ST HOSP IP/OBS HIGH 75: CPT | Performed by: INTERNAL MEDICINE

## 2019-05-04 PROCEDURE — 36415 COLL VENOUS BLD VENIPUNCTURE: CPT

## 2019-05-04 RX ORDER — GLIMEPIRIDE 2 MG/1
2 TABLET ORAL
Status: DISCONTINUED | OUTPATIENT
Start: 2019-05-05 | End: 2019-05-06 | Stop reason: HOSPADM

## 2019-05-04 RX ORDER — HYDROCODONE BITARTRATE AND ACETAMINOPHEN 5; 325 MG/1; MG/1
1 TABLET ORAL EVERY 6 HOURS PRN
Status: DISCONTINUED | OUTPATIENT
Start: 2019-05-04 | End: 2019-05-06 | Stop reason: HOSPADM

## 2019-05-04 RX ORDER — DEXTROSE MONOHYDRATE 25 G/50ML
12.5 INJECTION, SOLUTION INTRAVENOUS PRN
Status: DISCONTINUED | OUTPATIENT
Start: 2019-05-04 | End: 2019-05-06 | Stop reason: HOSPADM

## 2019-05-04 RX ORDER — DEXTROSE MONOHYDRATE 50 MG/ML
100 INJECTION, SOLUTION INTRAVENOUS PRN
Status: DISCONTINUED | OUTPATIENT
Start: 2019-05-04 | End: 2019-05-06 | Stop reason: HOSPADM

## 2019-05-04 RX ORDER — DOCUSATE SODIUM 100 MG/1
200 CAPSULE, LIQUID FILLED ORAL 2 TIMES DAILY
Status: DISCONTINUED | OUTPATIENT
Start: 2019-05-04 | End: 2019-05-06 | Stop reason: HOSPADM

## 2019-05-04 RX ORDER — NICOTINE POLACRILEX 4 MG
15 LOZENGE BUCCAL PRN
Status: DISCONTINUED | OUTPATIENT
Start: 2019-05-04 | End: 2019-05-06 | Stop reason: HOSPADM

## 2019-05-04 RX ORDER — ASCORBIC ACID 500 MG
500 TABLET ORAL DAILY
Status: DISCONTINUED | OUTPATIENT
Start: 2019-05-04 | End: 2019-05-06 | Stop reason: HOSPADM

## 2019-05-04 RX ADMIN — METHOTREXATE 15 MG: 2.5 TABLET ORAL at 16:40

## 2019-05-04 RX ADMIN — SIMVASTATIN 20 MG: 20 TABLET, FILM COATED ORAL at 21:38

## 2019-05-04 RX ADMIN — INSULIN LISPRO 2 UNITS: 100 INJECTION, SOLUTION INTRAVENOUS; SUBCUTANEOUS at 16:56

## 2019-05-04 RX ADMIN — OXYCODONE HYDROCHLORIDE AND ACETAMINOPHEN 500 MG: 500 TABLET ORAL at 16:39

## 2019-05-04 RX ADMIN — DOCUSATE SODIUM 200 MG: 100 CAPSULE, LIQUID FILLED ORAL at 12:04

## 2019-05-04 RX ADMIN — INSULIN LISPRO 1 UNITS: 100 INJECTION, SOLUTION INTRAVENOUS; SUBCUTANEOUS at 12:04

## 2019-05-04 RX ADMIN — SODIUM CHLORIDE: 9 INJECTION, SOLUTION INTRAVENOUS at 08:45

## 2019-05-04 RX ADMIN — LISINOPRIL 5 MG: 5 TABLET ORAL at 16:56

## 2019-05-04 RX ADMIN — CEFTRIAXONE SODIUM 1 G: 1 INJECTION, POWDER, FOR SOLUTION INTRAMUSCULAR; INTRAVENOUS at 16:39

## 2019-05-04 RX ADMIN — TAMSULOSIN HYDROCHLORIDE 0.4 MG: 0.4 CAPSULE ORAL at 07:48

## 2019-05-04 RX ADMIN — FOLIC ACID 1 MG: 1 TABLET ORAL at 16:56

## 2019-05-04 ASSESSMENT — PAIN SCALES - GENERAL: PAINLEVEL_OUTOF10: 0

## 2019-05-04 NOTE — H&P
History and Physical Service  [facility] Internal Medicine    HISTORY AND PHYSICAL EXAMINATION            Date:   5/4/2019  Patient name:  Vicente Menendez  MRN:   [id number]  Account:  [de-identified]  YOB: 1932  PCP:    Magalie Blas MD  Code Status:    Full Code    Chief Complaint:      hematuria    History Obtained From:     patient    History of Present Illness: The patient is a 80 y.o.   Non-/non  male who presents patient is a 80year-old with history of bladder cancer uncontrolled diabetes mellitus had a cystoscopy done a few days ago and agree laser for prostate hypertrophy was discharged home where he noted that he had gross hematuria he denied any flank pain and fever no aggravating relieving factors         Past Medical History:     Past Medical History:   Diagnosis Date    Arthritis     Diabetic neuropathy ([facility]     First degree AV block     Full dentures     Upper & Lower    H/O dizziness     being [facility] neurology    Hematuria 03/2018    Hyperlipidemia     Hypertension     Malignant neoplasm of urinary bladder ([facility] 03/2018    RBBB     Dr. 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    mets to lymph nodes, CHEMO AND RADIATION    Type II or unspecified type diabetes mellitus without mention of complication, not stated as uncontrolled     on Insulin    Wears glasses         Past Surgical History:     Past Surgical History:   Procedure Laterality Date    APPENDECTOMY  '70's    [address]    CLAVICLE SURGERY Right 1949    pins placed and removed    COLONOSCOPY      several polypectomy    CYSTOSCOPY  03/23/2018    CYSTOSCOPY  02/22/2019    turbt    CYSTOSCOPY N/A 2/22/2019    CYSTOSCOPY, TUR-BLADDER TUMOR, GYRUS , URETHRAL DILATATION performed by Beto House MD at [facility], COLON, [address]  11/25/2014    with EGD;  removed    DC OFFICE/OUTPT VISIT,PROCEDURE ONLY N/A 3/23/2018    CYSTOSCOPY, TUR BLADDER TUMOR WITH GYRUS performed by Dai Flores MD at [address] [facility]  11/2014    chemo Tx.  TONSILLECTOMY      TUNNELED VENOUS PORT PLACEMENT Left 11/17/14    chemo port insertion; left chest    TURP  03/23/2018    TURP  05/01/2019    cystoscopy    TURP N/A 5/1/2019    CYSTOSCOPY, TUR-PROSTATE GREENLIGHT XPS LASER performed by Dai Flores MD at [address] [facility]      umbilical repair        Medications Prior to Admission:     Prior to Admission medications    Medication Sig Start Date End Date Taking? Authorizing Provider   ciprofloxacin (CIPRO) 500 MG tablet Take 1 tablet by mouth 2 times daily for 3 days 5/1/19 5/4/19 Yes Zamzam Sandhu PA-C   glimepiride (AMARYL) 2 MG tablet Take 1 tablet by mouth every morning (before breakfast) 4/24/19 5/24/19 Yes Vinod Kolb MD   Multiple Vitamins-Minerals (CENTRUM SILVER PO) Take 1 tablet by mouth daily   Yes Historical Provider, MD   clotrimazole-betamethasone (LOTRISONE) 1-0.05 % cream Apply topically to head of penis (retract foreskin) once a day. 2/22/19  Yes Jyoti Olmstead MD   simvastatin (ZOCOR) 20 MG tablet TAKE ONE TABLET BY MOUTH ONCE DAILY 10/2/18  Yes Vinod Kolb MD   tamsulosin (FLOMAX) 0.4 MG capsule TAKE 1 CAPSULE BY MOUTH ONCE DAILY WITH  SUPPER 8/20/18  Yes Dai Flores MD   methotrexate (RHEUMATREX) 2.5 MG chemo tablet TK 6 TS PO ONCE A WEEK 4/14/18  Yes Historical Provider, MD   folic acid (FOLVITE) 1 MG tablet Take 1 mg by mouth Daily with supper   Yes Historical Provider, MD   lisinopril (PRINIVIL;ZESTRIL) 5 MG tablet Take 5 mg by mouth Daily with supper    Yes Historical Provider, MD   HYDROcodone-acetaminophen (NORCO) 5-325 MG per tablet Take 1 tablet by mouth every 6 hours as needed for Pain for up to 5 days.  May take 2 tablets if needed 5/1/19 5/6/19  Zamzam Sandhu PA-C   Ascorbic Acid (VITAMIN C) 500 MG CHEW Take 500 mg by mouth daily    Historical Provider, MD   blood glucose test strips (TRUETEST TEST) strip 1 each by In Vitro route daily 8/21/18   Dionisio Yuen MD   Lancets MISC 1 each by Does not apply route daily 1/26/16   Mack Marrero MD   glucose monitoring kit (FREESTYLE) monitoring kit 1 kit by Does not apply route daily as needed 1/26/16   Mack Marrero MD   Insulin Pen Needle (PEN NEEDLES 31GX5/16\") 31G X 8 MM MISC 1 each by Does not apply route 2 times daily. Historical Provider, MD        Allergies:     Patient has no known allergies. Social History:     Tobacco:    reports that he quit smoking about 27 years ago. His smoking use included cigarettes. He started smoking about 71 years ago. He smoked 2.00 packs per day. He has never used smokeless tobacco.  Alcohol:      reports that he does not drink alcohol. Drug Use:  reports that he does not use drugs. Family History:     Family History   Problem Relation Age of Onset    Heart Failure Father     Arthritis Father     No Known Problems Maternal Grandmother     No Known Problems Maternal Grandfather     No Known Problems Paternal Grandmother     No Known Problems Paternal Grandfather        Review of Systems:     Positive and Negative as described in HPI. CONSTITUTIONAL:  negative for fevers, chills, sweats, fatigue, weight loss  HEENT:  negative for vision, hearing changes, runny nose, throat pain  RESPIRATORY:  negative for shortness of breath, cough, congestion, wheezing. CARDIOVASCULAR:  negative for chest pain, palpitations.   GASTROINTESTINAL:  negative for nausea, vomiting, diarrhea, constipation, change in bowel habits, abdominal pain   GENITOURINARY: Admitted with across hematuria no pain no fever INTEGUMENT:  negative for rash, skin lesions, easy bruising   HEMATOLOGIC/LYMPHATIC:  negative for swelling/edema   ALLERGIC/IMMUNOLOGIC:  negative for urticaria , itching  ENDOCRINE:  negative increase in drinking, increase in urination, hot or cold intolerance  MUSCULOSKELETAL:  negative joint pains, muscle aches, swelling of joints  NEUROLOGICAL:  negative for headaches, dizziness, lightheadedness, numbness, pain, tingling extremities  BEHAVIOR/PSYCH:  negative for depression, anxiety    Physical Exam:   BP (!) 171/63   Pulse 59   Temp 97.9 °F (36.6 °C) (Oral)   Resp 16   Ht 6' 0.5\" (1.842 m)   Wt 177 lb (80.3 kg)   SpO2 96%   BMI 23.68 kg/m²   Recent Labs     05/03/19 2024 05/04/19 0651 05/04/19  1131   POCGLU 252* 90 189*       General Appearance:  alert, well appearing, and in no acute distress  Mental status: oriented to person, place, and time with normal affect  Head:  normocephalic, atraumatic. Eye: no icterus, redness, pupils equal and reactive, extraocular eye movements intact, conjunctiva clear  Ear: normal external ear, no discharge, hearing intact  Nose:  no drainage noted  Mouth: mucous membranes moist  Neck: supple, no carotid bruits, thyroid not palpable  Lungs: Bilateral equal air entry, clear to ausculation, no wheezing, rales or rhonchi, normal effort  Cardiovascular: normal rate, regular rhythm, no murmur, gallop, rub.   Abdomen: Soft, nontender, nondistended, normal bowel sounds, no hepatomegaly or splenomegaly  Gruber in place with the gross hematuria  Neurologic: There are no new focal motor or sensory deficits, normal muscle tone and bulk, no abnormal sensation, normal speech, cranial nerves II through XII grossly intact  Skin: No gross lesions, rashes, bruising or bleeding on exposed skin area  Extremities:  peripheral pulses palpable, no pedal edema or calf pain with palpation  Psych: normal affect     Investigations:      Laboratory Testing:  Recent Results (from the past 24 hour(s))   CBC Auto Differential    Collection Time: 05/03/19  3:55 PM   Result Value Ref Range    WBC 15.5 (H) 3.5 - 11.0 k/uL    RBC 4.25 (L) 4.5 - 5.9 m/uL    Hemoglobin 12.6 (L) 13.5 - 17.5 g/dL    Hematocrit 39.0 (L) 41 - 53 %    MCV 91.9 80 - 100 fL    MCH 29.7 26 - 34 pg    MCHC 32.3 31 - 37 g/dL    RDW 16.3 (H) 11.5 - 14.9 %    Platelets 478 591 - 189 k/uL    MPV 11.2 6.0 - 12.0 fL    NRBC Automated NOT REPORTED per 100 WBC    Differential Type NOT REPORTED     Immature Granulocytes NOT REPORTED 0 %    Absolute Immature Granulocyte NOT REPORTED 0.00 - 0.30 k/uL    WBC Morphology NOT REPORTED     RBC Morphology NOT REPORTED     Platelet Estimate NOT REPORTED     Seg Neutrophils 94 (H) 36 - 66 %    Lymphocytes 2 (L) 24 - 44 %    Monocytes 4 1 - 7 %    Eosinophils % 0 0 - 4 %    Basophils 0 0 - 2 %    Segs Absolute 14.57 (H) 1.3 - 9.1 k/uL    Absolute Lymph # 0.31 (L) 1.0 - 4.8 k/uL    Absolute Mono # 0.62 0.1 - 1.3 k/uL    Absolute Eos # 0.00 0.0 - 0.4 k/uL    Basophils # 0.00 0.0 - 0.2 k/uL    Morphology ANISOCYTOSIS PRESENT    Comprehensive Metabolic Panel    Collection Time: 05/03/19  3:55 PM   Result Value Ref Range    Glucose 306 (H) 70 - 99 mg/dL    BUN 31 (H) 8 - 23 mg/dL    CREATININE 0.83 0.70 - 1.20 mg/dL    Bun/Cre Ratio NOT REPORTED 9 - 20    Calcium 8.8 8.6 - 10.4 mg/dL    Sodium 138 135 - 144 mmol/L    Potassium 4.8 3.7 - 5.3 mmol/L    Chloride 98 98 - 107 mmol/L    CO2 26 20 - 31 mmol/L    Anion Gap 14 9 - 17 mmol/L    Alkaline Phosphatase 67 40 - 129 U/L    ALT 15 5 - 41 U/L    AST 23 <40 U/L    Total Bilirubin 1.64 (H) 0.3 - 1.2 mg/dL    Total Protein 6.5 6.4 - 8.3 g/dL    Alb 4.1 3.5 - 5.2 g/dL    Albumin/Globulin Ratio NOT REPORTED 1.0 - 2.5    GFR Non-African American >60 >60 mL/min    GFR African American >60 >60 mL/min    GFR Comment          GFR Staging NOT REPORTED    Protime-INR    Collection Time: 05/03/19  3:55 PM   Result Value Ref Range    Protime 13.6 11.8 - 14.6 sec    INR 1.0    TYPE AND SCREEN    Collection Time: 05/03/19  4:05 PM   Result Value Ref Range    Expiration Date 05/06/2019     Arm Band Number [id number]     ABO/Rh A POSITIVE     Antibody Screen NEGATIVE    Culture Blood #1    Collection Time: 05/03/19  4:15 PM   Result Value Ref Range    Specimen Description . BLOOD LEFT ARM, 9 ML LAV, 1 ML RED     Special Requests NOT REPORTED     Culture NO GROWTH 15 HOURS    Culture Blood #1    Collection Time: 05/03/19  4:20 PM   Result Value Ref Range    Specimen Description . BLOOD RIGHT ARM, 10 ML LAV, 10 ML RED     Special Requests NOT REPORTED     Culture NO GROWTH 15 HOURS    Urinalysis    Collection Time: 05/03/19  4:22 PM   Result Value Ref Range    Color, UA RED (A) YELLOW    Turbidity UA TURBID (A) CLEAR    Glucose, Ur 1+ (A) NEGATIVE    Bilirubin Urine (A) NEGATIVE     Presumptive positive. Unable to confirm due to unavailability of reagent. Ketones, Urine TRACE (A) NEGATIVE    Specific [place], UA 1.021 1.000 - 1.030    Urine Hgb LARGE (A) NEGATIVE    pH, UA 6.0 5.0 - 8.0    Protein, UA 4+ (A) NEGATIVE    Urobilinogen, Urine Normal Normal    Nitrite, Urine NEGATIVE NEGATIVE    Leukocyte Esterase, Urine MOD (A) NEGATIVE    Urinalysis Comments NOT REPORTED    Microscopic Urinalysis    Collection Time: 05/03/19  4:22 PM   Result Value Ref Range    -          WBC, UA 50  /HPF    RBC, UA TOO NUMEROUS TO COUNT /HPF    Casts UA NOT REPORTED /LPF    Crystals UA NOT REPORTED None /HPF    Epithelial Cells UA 0 TO 2 /HPF    Renal Epithelial, Urine NOT REPORTED 0 /HPF    Bacteria, UA FEW (A) None    Mucus, UA 2+ (A) None    Trichomonas, UA NOT REPORTED None    Amorphous, UA NOT REPORTED None    Other Observations UA (A) NOT REQ. INTERPRET WITH CAUTION DUE TO INTENSE COLOR OF URINE.     Yeast, UA NOT REPORTED None   Lactic Acid    Collection Time: 05/03/19  4:26 PM   Result Value Ref Range    Lactic Acid 1.6 0.5 - 2.2 mmol/L   POC Glucose Fingerstick    Collection Time: 05/03/19  8:24 PM   Result Value Ref Range    POC Glucose 252 (H) 75 - 110 mg/dL   Hgb/Hct    Collection Time: 05/03/19 11:58 PM   Result Value Ref Range    Hemoglobin 11.2 (L) 13.5 - 17.5 g/dL    Hematocrit 34.5 (L) 41 - 53 %   POC Glucose Fingerstick Collection Time: 05/04/19  6:51 AM   Result Value Ref Range    POC Glucose 90 75 - 110 mg/dL   Hgb/Hct    Collection Time: 05/04/19  7:50 AM   Result Value Ref Range    Hemoglobin 12.7 (L) 13.5 - 17.5 g/dL    Hematocrit 38.5 (L) 41 - 53 %   POC Glucose Fingerstick    Collection Time: 05/04/19 11:31 AM   Result Value Ref Range    POC Glucose 189 (H) 75 - 110 mg/dL       Recent Labs     05/04/19  0750 05/03/19  1555   HGB 12.7*   < > 12.6*   HCT 38.5*   < > 39.0*   WBC  --   --  15.5*   MCV  --   --  91.9   NA  --   --  138   K  --   --  4.8   CL  --   --  98   CO2  --   --  26   BUN  --   --  31*   CREATININE  --   --  0.83   GLUCOSE  --   --  306*   INR  --   --  1.0   PROTIME  --   --  13.6   AST  --   --  23   ALT  --   --  15   LABALBU  --   --  4.1    < > = values in this interval not displayed. Hematology:  Recent Labs     05/03/19  1555 05/03/19 2358 05/04/19  0750   WBC 15.5*  --   --    RBC 4.25*  --   --    HGB 12.6* 11.2* 12.7*   HCT 39.0* 34.5* 38.5*   MCV 91.9  --   --    MCH 29.7  --   --    MCHC 32.3  --   --    RDW 16.3*  --   --      --   --    MPV 11.2  --   --    INR 1.0  --   --      Chemistry:  Recent Labs     05/03/19  1555      K 4.8   CL 98   CO2 26   GLUCOSE 306*   BUN 31*   CREATININE 0.83   ANIONGAP 14   LABGLOM >60   GFRAA >60   CALCIUM 8.8     Recent Labs     05/03/19  1555 05/03/19 2024 05/04/19  0651 05/04/19  1131   PROT 6.5  --   --   --    LABALBU 4.1  --   --   --    AST 23  --   --   --    ALT 15  --   --   --    ALKPHOS 67  --   --   --    BILITOT 1.64*  --   --   --    POCGLU  --  252* 90 189*       Imaging/Diagnostics:       No results found. Impressions :      1. Active Problems:    Hematuria due to cystitis  Resolved Problems:    * No resolved hospital problems.  *        2.  has a past medical history of Arthritis, Diabetic neuropathy ([facility] First degree AV block, Full dentures, H/O dizziness, Hematuria (03/2018), Hyperlipidemia, Hypertension, Malignant neoplasm of [facility] bladder ([facility] (03/2018), RBBB, Tongue cancer ([facility] (11/2014), Type II or unspecified type diabetes mellitus without mention of complication, not stated as uncontrolled, and Wears glasses.      Plans:     Patient with a history of a bladder cancer status post greenlight laser for prostate admitted for a gross hematuria  Three-way catheter with irrigation  Urology input noted  Diabetes mellitus uncontrolled meds reviewed and adjusted    Current Facility-Administered Medications   Medication Dose Route Frequency Provider Last Rate Last Dose    docusate sodium (COLACE) capsule 200 mg  200 mg Oral BID Bebeto Escobar MD        0.9 % sodium chloride infusion   Intravenous Continuous Carline Luu  mL/hr at 05/03/19 1629 125 mL/hr at 05/03/19 1629    sodium chloride flush 0.9 % injection 10 mL  10 mL Intravenous 2 times per day Benjamin Quan MD        sodium chloride flush 0.9 % injection 10 mL  10 mL Intravenous PRN Benjamin Quan MD        acetaminophen (TYLENOL) tablet 650 mg  650 mg Oral Q4H PRN Benjamin Quan MD        0.9 % sodium chloride infusion   Intravenous Continuous Benjamin Quan  mL/hr at 05/04/19 0845      clotrimazole-betamethasone (LOTRISONE) cream   Topical BID PRN Naheed Caraballo MD        folic acid (FOLVITE) tablet 1 mg  1 mg Oral Dinner Naheed Caraballo MD   1 mg at 05/03/19 2204    lisinopril (PRINIVIL;ZESTRIL) tablet 5 mg  5 mg Oral Dinner Naheed Caraballo MD   5 mg at 05/03/19 2204    simvastatin (ZOCOR) tablet 20 mg  20 mg Oral Nightly Naheed Caraballo MD   20 mg at 05/03/19 2203    tamsulosin (FLOMAX) capsule 0.4 mg  0.4 mg Oral Daily Naheed Caraballo MD   0.4 mg at 05/04/19 0748    cefTRIAXone (ROCEPHIN) 1 g IVPB in 50 mL D5W minibag  1 g Intravenous Q24H Naheed Caraballo MD        insulin lispro (HUMALOG) injection vial 0-6 Units  0-6 Units Subcutaneous TID  Naheed Caraballo MD        insulin lispro (HUMALOG) injection vial 0-3 Units 0-3 Units Subcutaneous Nightly Luis A Prajapati MD   2 Units at 05/03/19 6 Franke Mateo Camejo MD  5/4/2019  11:42 AM

## 2019-05-04 NOTE — CONSULTS
insertion; left chest    TURP  03/23/2018    TURP  05/01/2019    cystoscopy    TURP N/A 5/1/2019    CYSTOSCOPY, TUR-PROSTATE GREENLIGHT XPS LASER performed by Qasim Espinoza MD at [facility]      umbilical repair       Medications:      Current Facility-Administered Medications:     0.9 % sodium chloride infusion, , Intravenous, Continuous, Americo Bustamante MD, Last Rate: 125 mL/hr at 05/03/19 1629, 125 mL/hr at 05/03/19 1629    sodium chloride flush 0.9 % injection 10 mL, 10 mL, Intravenous, 2 times per day, Alex Riggins MD    sodium chloride flush 0.9 % injection 10 mL, 10 mL, Intravenous, PRN, Alex Riggins MD    acetaminophen (TYLENOL) tablet 650 mg, 650 mg, Oral, Q4H PRN, Alex Riggins MD    0.9 % sodium chloride infusion, , Intravenous, Continuous, Alex Riggins MD, Last Rate: 125 mL/hr at 05/04/19 0845    clotrimazole-betamethasone (LOTRISONE) cream, , Topical, BID PRN, Calvin Caldera MD    folic acid (FOLVITE) tablet 1 mg, 1 mg, Oral, Dinner, Calvin Caldera MD, 1 mg at 05/03/19 2204    lisinopril (PRINIVIL;ZESTRIL) tablet 5 mg, 5 mg, Oral, Dinner, Calvin Caldera MD, 5 mg at 05/03/19 2204    simvastatin (ZOCOR) tablet 20 mg, 20 mg, Oral, Nightly, Calvin Caldera MD, 20 mg at 05/03/19 2203    tamsulosin (FLOMAX) capsule 0.4 mg, 0.4 mg, Oral, Daily, Calvin Caldera MD, 0.4 mg at 05/04/19 0748    cefTRIAXone (ROCEPHIN) 1 g IVPB in 50 mL D5W minibag, 1 g, Intravenous, Q24H, Calvin Caldera MD    insulin lispro (HUMALOG) injection vial 0-6 Units, 0-6 Units, Subcutaneous, TID WC, Calvin Caldera MD    insulin lispro (HUMALOG) injection vial 0-3 Units, 0-3 Units, Subcutaneous, Nightly, Calvin Caldera MD, 2 Units at 05/03/19 2203    Allergies:  No Known Allergies    Social History:   Social History     Socioeconomic History    Marital status:      Spouse name: Briseida Estes Number of children: 3    Years of education: Not on file   [employer] education level: Not on file   Occupational History     Employer: [employer]   Social Needs    Financial resource strain: Not on file    Food insecurity:     Worry: Not on file     Inability: Not on file    Transportation needs:     Medical: Not on file     Non-medical: Not on file   Tobacco Use    Smoking status: Former Smoker     Packs/day: 2.00     Types: Cigarettes     Start date:      Last attempt to quit: 1991     Years since quittin.4    Smokeless tobacco: Never Used   Substance and Sexual Activity    Alcohol use: No     Alcohol/week: 0.0 oz    Drug use: No    Sexual activity: Yes     Partners: Female   Lifestyle    Physical activity:     Days per week: Not on file     Minutes per session: Not on file    Stress: Not on file   Relationships    Social connections:     Talks on phone: Not on file     Gets together: Not on file     Attends [employer] service: Not on file     Active member of club or organization: Not on file     Attends meetings of clubs or organizations: Not on file     Relationship status: Not on file    Intimate partner violence:     Fear of current or ex partner: Not on file     Emotionally abused: Not on file     Physically abused: Not on file     Forced sexual activity: Not on file   Other Topics Concern    Not on file   Social History Narrative    Not on file       Family History:    Family History   Problem Relation Age of Onset    Heart Failure Father     Arthritis Father     No Known Problems Maternal Grandmother     No Known Problems Maternal Grandfather     No Known Problems Paternal Grandmother     No Known Problems Paternal Grandfather        REVIEW OF SYSTEMS:  A comprehensive 14 point review of systems was obtained. Constitutional: No fatigue  Eyes: No blurry vision  Ears, nose, mouth, throat, face: No ringing in the ears; no facial droop. Respiratory: No cough or cold.   Cardiovascular: No palpitations  Gastrointestinal: No diarrhea or Type 2 diabetes mellitus with neurologic complication (HCC)    Diabetic polyneuropathy (HCC)    Disorder of arteries and arterioles (HCC)    Memory loss    Tongue cancer (HCC)    Anorexia    Weight loss    Anxiety    Rash    Oral mucositis    Bradycardia    Chronic combined systolic and diastolic congestive heart failure (HCC)    History of tobacco abuse    Rheumatoid arteritis    Slow transit constipation    Orthostatic hypotension    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)    Right inguinal hernia    Bladder cancer (HCC)    Hematuria due to cystitis           Plan:     Continue irrigation and wean off; once clear can clamp irrigation  Will likely leave curtis in upon discharge. Void trial next week in office  Resume any blood thinners in 5 days    Call with any questions      Thank you for involving us in the care of [facility]. Should you have any questions, please do not hesitate to contact us at any time.     Moncho Malin M.D  9:24 AM 5/4/2019

## 2019-05-04 NOTE — PLAN OF CARE
Problem: Falls - Risk of:  Goal: Will remain free from falls  Description  Will remain free from falls  Outcome: Ongoing     Problem: ABCDS Injury Assessment  Goal: Absence of physical injury  Outcome: Ongoing

## 2019-05-04 NOTE — PROGRESS NOTES
Difficulty swallowing meat  · Current Nutrition Therapies:  · Oral Diet Orders: General   · Oral Diet intake: 51-75%, 26-50%  · Anthropometric Measures:  · Ht: 6' 0.5\" (184.2 cm)   · Current Body Wt: 177 lb (80.3 kg)  · Admission Body Wt: 177 lb (80.3 kg)  · Usual Body Wt: 186 lb (84.4 kg)  · % Weight Change:  ,  4.8% loss in 5 months  · Ideal Body Wt: 181 lb (82.1 kg), % Ideal Body 98%  · BMI Classification: BMI 18.5 - 24.9 Normal Weight    Nutrition Interventions:   Continue current diet, Start ONS  Continued Inpatient Monitoring    Nutrition Evaluation:   · Evaluation: Goals set   · Goals: PO intakes are % at meals    · Monitoring: Meal Intake, Supplement Intake, Weight, Pertinent Labs, Monitor Bowel Function, Diet Tolerance, Skin Integrity, I&O      Nutrition Problem: Increased nutrient needs  Intervention: Food and/or Nutrient Delivery: Continue current diet, Start ONS  Nutritional Goals: PO intakes are % at meals

## 2019-05-04 NOTE — CARE COORDINATION
CASE MANAGEMENT NOTE:    Admission Date:  5/3/2019 Teo Dela Cruz is a 80 y.o.  male    Admitted for : Hematuria due to cystitis [N30.91]    Met with:  Patient    PCP:  Juan Ramsey                                Insurance:  Medicare / [facility]      Current Residence/ Living Arrangements:  independently at home             Current Services PTA:  No    Is patient agreeable to VNS: Will consider. Freedom of choice provided: Yes    List of [address] provided: No    VNS chosen:  [address] if patient decides yes. DME:  none    Home Oxygen: No    Nebulizer: No    CPAP/BIPAP: No    Supplier: N/A    Potential Assistance Needed: No    SNF needed: No    Pharmacy:  [address] [facility]       Is the Patient an Xockets with Readmission Risk Score greater than 14%? Yes  If yes, pt needs a follow up appointment made within 7 days. Does Patient want to use MEDS to BEDS? No    Family Members/Caregivers that pt would like involved in their care:    Yes    If yes, list name here: Wife Marisol Ross and son Chata    Transportation Provider:  Patient             Is patient in Isolation/One on One/Altered Mental Status? No  If yes, skip next question. If no, would they like an I-Pad to  use? No  If yes, call [phone]. Discharge Plan:  5/4/19: Kizzy Vilchis / [facility] - independent from home and drives. DME: none. Hematuria. On IV rocephin. Will consider using [facility]. Orange hdr 15%.  F/u Jorge 5/7 @ 2:45pm.                 Electronically signed by: Thai Siddiqi RN on 5/4/2019 at 11:28 AM

## 2019-05-04 NOTE — PLAN OF CARE
Nutrition Problem: Increased nutrient needs  Intervention: Food and/or Nutrient Delivery: Continue current diet, Start ONS  Nutritional Goals: PO intakes are % at meals

## 2019-05-05 LAB
ESTIMATED AVERAGE GLUCOSE: 154 MG/DL
GLUCOSE BLD-MCNC: 162 MG/DL (ref 75–110)
GLUCOSE BLD-MCNC: 198 MG/DL (ref 75–110)
GLUCOSE BLD-MCNC: 220 MG/DL (ref 75–110)
GLUCOSE BLD-MCNC: 241 MG/DL (ref 75–110)
HBA1C MFR BLD: 7 % (ref 4–6)
HCT VFR BLD CALC: 32.2 % (ref 41–53)
HCT VFR BLD CALC: 36.5 % (ref 41–53)
HEMOGLOBIN: 10.7 G/DL (ref 13.5–17.5)
HEMOGLOBIN: 11.6 G/DL (ref 13.5–17.5)

## 2019-05-05 PROCEDURE — 85018 HEMOGLOBIN: CPT

## 2019-05-05 PROCEDURE — 83036 HEMOGLOBIN GLYCOSYLATED A1C: CPT

## 2019-05-05 PROCEDURE — 6370000000 HC RX 637 (ALT 250 FOR IP): Performed by: INTERNAL MEDICINE

## 2019-05-05 PROCEDURE — 82947 ASSAY GLUCOSE BLOOD QUANT: CPT

## 2019-05-05 PROCEDURE — 2580000003 HC RX 258: Performed by: INTERNAL MEDICINE

## 2019-05-05 PROCEDURE — 6370000000 HC RX 637 (ALT 250 FOR IP): Performed by: UROLOGY

## 2019-05-05 PROCEDURE — 85014 HEMATOCRIT: CPT

## 2019-05-05 PROCEDURE — 6360000002 HC RX W HCPCS: Performed by: INTERNAL MEDICINE

## 2019-05-05 PROCEDURE — 36415 COLL VENOUS BLD VENIPUNCTURE: CPT

## 2019-05-05 PROCEDURE — 1200000000 HC SEMI PRIVATE

## 2019-05-05 PROCEDURE — 99232 SBSQ HOSP IP/OBS MODERATE 35: CPT | Performed by: INTERNAL MEDICINE

## 2019-05-05 RX ADMIN — Medication 10 ML: at 08:37

## 2019-05-05 RX ADMIN — OXYCODONE HYDROCHLORIDE AND ACETAMINOPHEN 500 MG: 500 TABLET ORAL at 08:38

## 2019-05-05 RX ADMIN — DOCUSATE SODIUM 200 MG: 100 CAPSULE, LIQUID FILLED ORAL at 08:38

## 2019-05-05 RX ADMIN — GLIMEPIRIDE 2 MG: 2 TABLET ORAL at 06:18

## 2019-05-05 RX ADMIN — TAMSULOSIN HYDROCHLORIDE 0.4 MG: 0.4 CAPSULE ORAL at 08:38

## 2019-05-05 RX ADMIN — SODIUM CHLORIDE: 9 INJECTION, SOLUTION INTRAVENOUS at 23:29

## 2019-05-05 RX ADMIN — DOCUSATE SODIUM 200 MG: 100 CAPSULE, LIQUID FILLED ORAL at 21:02

## 2019-05-05 RX ADMIN — SODIUM CHLORIDE: 9 INJECTION, SOLUTION INTRAVENOUS at 00:30

## 2019-05-05 RX ADMIN — INSULIN LISPRO 2 UNITS: 100 INJECTION, SOLUTION INTRAVENOUS; SUBCUTANEOUS at 11:32

## 2019-05-05 RX ADMIN — FOLIC ACID 1 MG: 1 TABLET ORAL at 17:31

## 2019-05-05 RX ADMIN — INSULIN LISPRO 1 UNITS: 100 INJECTION, SOLUTION INTRAVENOUS; SUBCUTANEOUS at 21:02

## 2019-05-05 RX ADMIN — SODIUM CHLORIDE: 9 INJECTION, SOLUTION INTRAVENOUS at 15:30

## 2019-05-05 RX ADMIN — LISINOPRIL 5 MG: 5 TABLET ORAL at 17:31

## 2019-05-05 RX ADMIN — CEFTRIAXONE SODIUM 1 G: 1 INJECTION, POWDER, FOR SOLUTION INTRAMUSCULAR; INTRAVENOUS at 16:35

## 2019-05-05 RX ADMIN — SIMVASTATIN 20 MG: 20 TABLET, FILM COATED ORAL at 21:02

## 2019-05-05 RX ADMIN — INSULIN LISPRO 1 UNITS: 100 INJECTION, SOLUTION INTRAVENOUS; SUBCUTANEOUS at 17:31

## 2019-05-05 RX ADMIN — SODIUM CHLORIDE: 9 INJECTION, SOLUTION INTRAVENOUS at 06:49

## 2019-05-05 NOTE — PROGRESS NOTES
Urine clear and no clots present. Bladder irrigation clamped at this time. Will continue to monitor.

## 2019-05-05 NOTE — PROGRESS NOTES
Notified Dr. Guanaco Gaines and Dr. Sheryl Drake regarding patient's low urine output of 30 mL/hour today. No new orders at this time. Will continue to monitor.

## 2019-05-05 NOTE — PROGRESS NOTES
History and Physical Service  [facility] Internal Medicine  Progress note              Date:   5/5/2019  Patient name:  Bree Broderick  MRN:   [id number]  Account:  [de-identified]  YOB: 1932  PCP:    Geovanna Short MD  Code Status:    Full Code    Chief Complaint:      hematuria    History Obtained From:     patient    History of Present Illness: The patient is a 80 y.o.   Non-/non  male who presents patient is a 80year-old with history of bladder cancer uncontrolled diabetes mellitus had a cystoscopy done a few days ago and agree laser for prostate hypertrophy was discharged home where he noted that he had gross hematuria he denied any flank pain and fever no aggravating relieving factors         Past Medical History:     Past Medical History:   Diagnosis Date    Arthritis     Di[facility] neuropathy [facility]     First degree AV block     Full dentures     Upper & Lower    H/O dizziness     being worked up by [facility] neurology    Hematuria 03/2018    Hyperlipidemia     Hypertension     Malignant neoplasm of urinary bladder ([facility] 03/2018    RBBB     Dr. 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    mets to lymph nodes, CHEMO AND RADIATION    Type II or unspecified type diabetes mellitus without mention of complication, not stated as uncontrolled     on Insulin    Wears glasses         Past Surgical History:     Past Surgical History:   Procedure Laterality Date    APPENDECTOMY  '70's    [address]    CLAVICLE SURGERY Right 1949    pins placed and removed    COLONOSCOPY      several polypectomy    CYSTOSCOPY  03/23/2018    CYSTOSCOPY  02/22/2019    turbt    CYSTOSCOPY N/A 2/22/2019    CYSTOSCOPY, TUR-BLADDER TUMOR, GYRUS , URETHRAL DILATATION performed by Lev Brewer MD at [address], COLON, [address]  11/25/2014    with EGD;  removed    [place] OFFICE/OUTPT VISIT,PROCEDURE ONLY (FREESTYLE) monitoring kit 1 kit by Does not apply route daily as needed 1/26/16   Ronny Granda MD   Insulin Pen Needle (PEN NEEDLES 31GX5/16\") 31G X 8 MM MISC 1 each by Does not apply route 2 times daily. Historical Provider, MD        Allergies:     Patient has no known allergies. Social History:     Tobacco:    reports that he quit smoking about 27 years ago. His smoking use included cigarettes. He started smoking about 71 years ago. He smoked 2.00 packs per day. He has never used smokeless tobacco.  Alcohol:      reports that he does not drink alcohol. Drug Use:  reports that he does not use drugs. Family History:     Family History   Problem Relation Age of Onset    Heart Failure Father     Arthritis Father     No Known Problems Maternal Grandmother     No Known Problems Maternal Grandfather     No Known Problems Paternal Grandmother     No Known Problems Paternal Grandfather        Review of Systems:     Positive and Negative as described in HPI. CONSTITUTIONAL:  negative for fevers, chills, sweats, fatigue, weight loss  HEENT:  negative for vision, hearing changes, runny nose, throat pain  RESPIRATORY:  negative for shortness of breath, cough, congestion, wheezing. CARDIOVASCULAR:  negative for chest pain, palpitations.   GASTROINTESTINAL:  negative for nausea, vomiting, diarrhea, constipation, change in bowel habits, abdominal pain   GENITOURINARY: Admitted with across hematuria no pain no fever INTEGUMENT:  negative for rash, skin lesions, easy bruising   HEMATOLOGIC/LYMPHATIC:  negative for swelling/edema   ALLERGIC/IMMUNOLOGIC:  negative for urticaria , itching  ENDOCRINE:  negative increase in drinking, increase in urination, hot or cold intolerance  MUSCULOSKELETAL:  negative joint pains, muscle aches, swelling of joints  NEUROLOGICAL:  negative for headaches, dizziness, lightheadedness, numbness, pain, tingling extremities  BEHAVIOR/PSYCH:  negative for depression, --    LABALBU 4.1  --   --   --   --   --   --   --   --    LABA1C  --   --   --   --   --   --   --  7.0*  --    AST 23  --   --   --   --   --   --   --   --    ALT 15  --   --   --   --   --   --   --   --    ALKPHOS 67  --   --   --   --   --   --   --   --    BILITOT 1.64*  --   --   --   --   --   --   --   --    POCGLU  --    < > 90 189* 209* 138* 162*  --  241*    < > = values in this interval not displayed. Imaging/Diagnostics:       No results found. Impressions :      1. Active Problems:    Hematuria due to cystitis    Severe malnutrition (HCC)  Resolved Problems:    * No resolved hospital problems. *        2.  has a past medical history of Arthritis, Diabetic neuropathy ([facility] First degree AV block, Full dentures, H/O dizziness, Hematuria (03/2018), Hyperlipidemia, Hypertension, Malignant neoplasm of urinary bladder ([facility] (03/2018), RBBB, Tongue cancer ([facility] (11/2014), Type II or unspecified type diabetes mellitus without mention of complication, not stated as uncontrolled, and Wears glasses.      Plans:     Patient with a history of a bladder cancer status post greenlight laser for prostate admitted for a gross hematuria  Three-way catheter with irrigation  Urology input noted  Diabetes mellitus uncontrolled meds reviewed and adjusted  May 5  Hematuria resolved irrigation has been stopped urine output is low 30 ML's an hour patient feels very fatigued and wants to urine output hydrate OT and PT reevaluated tomorrow for discharge    Current Facility-Administered Medications   Medication Dose Route Frequency Provider Last Rate Last Dose    docusate sodium (COLACE) capsule 200 mg  200 mg Oral BID Bebeto Escobar MD   200 mg at 05/05/19 0838    vitamin C (ASCORBIC ACID) tablet 500 mg  500 mg Oral Daily Aishwarya Curry MD   500 mg at 05/05/19 0838    HYDROcodone-acetaminophen (NORCO) 5-325 MG per tablet 1 tablet  1 tablet Oral Q6H PRN Aishwarya Curry MD        methotrexate ([address]) chemo tablet 15 mg  15 mg Oral Once per day on Sat Rex Downs MD   15 mg at 05/04/19 1640    glimepiride (AMARYL) tablet 2 mg  2 mg Oral QAM AC Rex Downs MD   2 mg at 05/05/19 0618    glucose (GLUTOSE) 40 % oral gel 15 g  15 g Oral PRN Loretta Watts MD        dextrose 50 % solution 12.5 g  12.5 g Intravenous PRN Loretta Watts MD        glucagon (rDNA) injection 1 mg  1 mg Intramuscular PRN Loretta Watts MD        dextrose 5 % solution  100 mL/hr Intravenous PRN Loretta Watts MD        0.9 % sodium chloride infusion   Intravenous Continuous Clarissa Garber  mL/hr at 05/03/19 1629 125 mL/hr at 05/03/19 1629    sodium chloride flush 0.9 % injection 10 mL  10 mL Intravenous 2 times per day Rex Downs MD   10 mL at 05/05/19 0837    sodium chloride flush 0.9 % injection 10 mL  10 mL Intravenous PRN Rex Downs MD        acetaminophen (TYLENOL) tablet 650 mg  650 mg Oral Q4H PRN Rex Downs MD        0.9 % sodium chloride infusion   Intravenous Continuous Rex Downs  mL/hr at 05/05/19 8008      clotrimazole-betamethasone (LOTRISONE) cream   Topical BID PRN Loretta Watts MD        folic acid (FOLVITE) tablet 1 mg  1 mg Oral Dinner Loretta Watts MD   1 mg at 05/04/19 1656    lisinopril (PRINIVIL;ZESTRIL) tablet 5 mg  5 mg Oral Dinner Loretta Watts MD   5 mg at 05/04/19 1656    simvastatin (ZOCOR) tablet 20 mg  20 mg Oral Nightly Loretta Watts MD   20 mg at 05/04/19 2138    tamsulosin (FLOMAX) capsule 0.4 mg  0.4 mg Oral Daily Loretta Watts MD   0.4 mg at 05/05/19 0838    cefTRIAXone (ROCEPHIN) 1 g IVPB in 50 mL D5W minibag  1 g Intravenous Q24H Loretta Watts MD   Stopped at 05/04/19 1709    insulin lispro (HUMALOG) injection vial 0-6 Units  0-6 Units Subcutaneous TID  Loretta Watts MD   2 Units at 05/05/19 1132    insulin lispro (HUMALOG) injection vial 0-3 Units  0-3 Units Subcutaneous Nightly Loretta Watts MD   2 Units at 05/03/19 8099 Adama Perdomo MD  5/5/2019  1:00 PM

## 2019-05-05 NOTE — PROGRESS NOTES
Light pink urine and clots present. Bladder irrigation restarted at slow rate.  Will continue to monitor

## 2019-05-05 NOTE — PROGRESS NOTES
Patient has history of tongue and throat cancer and was having some difficulty swallowing pills. Gave morning pills in applesauce. Patient reports that this was easier for him.

## 2019-05-05 NOTE — FLOWSHEET NOTE
SC visit with patient and his wife; [facility] and talked about his [place] [place] [address] ([place]); patient shared details of his medical issues and circumstances; patient is anxious and wife is disappointed to be missing a Buddhist gathering today; listening presence and support;     05/05/19 1516   Encounter Summary   Services provided to: Patient and family together   Referral/Consult From: Rounding   Support System Spouse   Continue Visiting   (5/5/19)   Complexity of Encounter Moderate   Length of Encounter 15 minutes   Spiritual Assessment Completed Yes   Routine   Type Initial   Grief and Life Adjustment   Type Adjustment to illness   Assessment Approachable; Anxious; Hopeful;Coping   Intervention Active listening;Explored feelings, thoughts, concerns;Prayer;Sustaining presence/ Ministry of presence; Discussed illness/injury and it's impact   Outcome Comfort;Expressed gratitude;Engaged in conversation;Expressed feelings/needs/concerns;Coping; Hopeful;Receptive

## 2019-05-05 NOTE — PROGRESS NOTES
Patient's urine continues to be clear with no clots at this time. Bladder irrigation remains clamped. Will continue to monitor.

## 2019-05-06 VITALS
WEIGHT: 177 LBS | TEMPERATURE: 98.3 F | RESPIRATION RATE: 16 BRPM | HEIGHT: 73 IN | OXYGEN SATURATION: 96 % | SYSTOLIC BLOOD PRESSURE: 160 MMHG | BODY MASS INDEX: 23.46 KG/M2 | HEART RATE: 55 BPM | DIASTOLIC BLOOD PRESSURE: 57 MMHG

## 2019-05-06 LAB
GLUCOSE BLD-MCNC: 180 MG/DL (ref 75–110)
GLUCOSE BLD-MCNC: 268 MG/DL (ref 75–110)
HCT VFR BLD CALC: 31.2 % (ref 41–53)
HCT VFR BLD CALC: 36 % (ref 41–53)
HEMOGLOBIN: 10.1 G/DL (ref 13.5–17.5)
HEMOGLOBIN: 11.9 G/DL (ref 13.5–17.5)

## 2019-05-06 PROCEDURE — 82947 ASSAY GLUCOSE BLOOD QUANT: CPT

## 2019-05-06 PROCEDURE — 85018 HEMOGLOBIN: CPT

## 2019-05-06 PROCEDURE — 6370000000 HC RX 637 (ALT 250 FOR IP): Performed by: INTERNAL MEDICINE

## 2019-05-06 PROCEDURE — 97116 GAIT TRAINING THERAPY: CPT

## 2019-05-06 PROCEDURE — 85014 HEMATOCRIT: CPT

## 2019-05-06 PROCEDURE — 6370000000 HC RX 637 (ALT 250 FOR IP): Performed by: UROLOGY

## 2019-05-06 PROCEDURE — 2580000003 HC RX 258: Performed by: INTERNAL MEDICINE

## 2019-05-06 PROCEDURE — 97162 PT EVAL MOD COMPLEX 30 MIN: CPT

## 2019-05-06 PROCEDURE — 99239 HOSP IP/OBS DSCHRG MGMT >30: CPT | Performed by: INTERNAL MEDICINE

## 2019-05-06 PROCEDURE — 36415 COLL VENOUS BLD VENIPUNCTURE: CPT

## 2019-05-06 PROCEDURE — 97166 OT EVAL MOD COMPLEX 45 MIN: CPT

## 2019-05-06 PROCEDURE — 97530 THERAPEUTIC ACTIVITIES: CPT

## 2019-05-06 RX ORDER — HYDROCODONE BITARTRATE AND ACETAMINOPHEN 5; 325 MG/1; MG/1
1 TABLET ORAL EVERY 6 HOURS PRN
Qty: 8 TABLET | Refills: 0 | Status: SHIPPED | OUTPATIENT
Start: 2019-05-06 | End: 2019-05-06 | Stop reason: SDUPTHER

## 2019-05-06 RX ORDER — HYDROCODONE BITARTRATE AND ACETAMINOPHEN 5; 325 MG/1; MG/1
1 TABLET ORAL EVERY 6 HOURS PRN
Qty: 8 TABLET | Refills: 0 | Status: SHIPPED | OUTPATIENT
Start: 2019-05-06 | End: 2019-05-08

## 2019-05-06 RX ORDER — PSEUDOEPHEDRINE HCL 30 MG
200 TABLET ORAL 2 TIMES DAILY
Qty: 120 CAPSULE | Refills: 0 | Status: SHIPPED | OUTPATIENT
Start: 2019-05-06 | End: 2019-08-20

## 2019-05-06 RX ORDER — CEFUROXIME AXETIL 500 MG/1
500 TABLET ORAL 2 TIMES DAILY
Qty: 14 TABLET | Refills: 0 | Status: SHIPPED | OUTPATIENT
Start: 2019-05-06 | End: 2019-05-13

## 2019-05-06 RX ADMIN — TAMSULOSIN HYDROCHLORIDE 0.4 MG: 0.4 CAPSULE ORAL at 07:26

## 2019-05-06 RX ADMIN — OXYCODONE HYDROCHLORIDE AND ACETAMINOPHEN 500 MG: 500 TABLET ORAL at 07:26

## 2019-05-06 RX ADMIN — INSULIN LISPRO 3 UNITS: 100 INJECTION, SOLUTION INTRAVENOUS; SUBCUTANEOUS at 11:16

## 2019-05-06 RX ADMIN — GLIMEPIRIDE 2 MG: 2 TABLET ORAL at 06:29

## 2019-05-06 RX ADMIN — SODIUM CHLORIDE: 9 INJECTION, SOLUTION INTRAVENOUS at 07:32

## 2019-05-06 RX ADMIN — DOCUSATE SODIUM 200 MG: 100 CAPSULE, LIQUID FILLED ORAL at 07:26

## 2019-05-06 ASSESSMENT — PAIN DESCRIPTION - DESCRIPTORS
DESCRIPTORS: SORE
DESCRIPTORS: SORE

## 2019-05-06 ASSESSMENT — PAIN DESCRIPTION - FREQUENCY
FREQUENCY: INTERMITTENT
FREQUENCY: INTERMITTENT

## 2019-05-06 ASSESSMENT — PAIN SCALES - GENERAL
PAINLEVEL_OUTOF10: 8
PAINLEVEL_OUTOF10: 8

## 2019-05-06 ASSESSMENT — PAIN DESCRIPTION - ORIENTATION
ORIENTATION: RIGHT
ORIENTATION: RIGHT

## 2019-05-06 ASSESSMENT — PAIN DESCRIPTION - PROGRESSION
CLINICAL_PROGRESSION: GRADUALLY WORSENING
CLINICAL_PROGRESSION: GRADUALLY WORSENING

## 2019-05-06 ASSESSMENT — PAIN DESCRIPTION - PAIN TYPE
TYPE: CHRONIC PAIN
TYPE: CHRONIC PAIN

## 2019-05-06 ASSESSMENT — PAIN DESCRIPTION - LOCATION
LOCATION: KNEE
LOCATION: KNEE

## 2019-05-06 NOTE — CARE COORDINATION
ONGOING DISCHARGE PLAN:    Spoke with patient regarding discharge plan and patient confirms that plan is still home with spouse and declined VNS. Urology consulted. Active order for IV rocephin. Will continue to follow for additional discharge needs.     Electronically signed by Tejas Dela Cruz RN on 5/6/2019 at 9:09 AM

## 2019-05-06 NOTE — PROGRESS NOTES
regarding mobility. OT provided skilled d/c rec of SNF due to Pt's fear of falling. Vision  Vision: Impaired  Vision Exceptions: Wears glasses for reading  Hearing  Hearing: Exceptions to [facility]  Hearing Exceptions: Hard of hearing/hearing concerns, No hearing aid(R ear [place])  Social/Functional History  Lives With: Spouse  Type of Home: House  Home Layout: Performs ADL's on one level, Able to Live on Main level with bedroom/bathroom(1.5 stories)  Home Access: Stairs to enter without rails  Entrance Stairs - Number of Steps: 2 + 2 R HR  Bathroom Shower/Tub: Tub/Shower unit, Curtain  Bathroom Toilet: Handicap height  Bathroom Equipment: (no DME)  Home Equipment: Cane, Rolling walker  Receives Help From: Family  ADL Assistance: Independent  Homemaking Assistance: (spouse is primary)  Ambulation Assistance: Independent  Transfer Assistance: Independent  Active : Yes  Mode of Transportation: Truck  Occupation: Retired  Type of 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 Pain Assessment  Pain Assessment: 0-10  Pain Level: 8  Pain Type: Chronic pain  Pain Location: Knee  Pain Orientation: Right  Pain Descriptors: Sore  Pain Frequency: Intermittent(w/ movement)  Clinical Progression: Gradually worsening    Objective  Vision - Basic Assessment  Prior Vision: Wears glasses only for reading  Visual History: No significant visual history  Patient Visual Report: No visual complaint reported.    Cognition  Overall Cognitive Status: WFL   Perception  Overall Perceptual Status: WFL  Sensation  Overall Sensation Status: WFL(denies)   ADL  Feeding: Modified independent   Grooming: Setup  UE Bathing: Supervision, Setup  LE Bathing: Minimal assistance, Setup  UE Dressing: Setup  LE Dressing: Minimal assistance, Setup  Toileting: Minimal assistance, Setup  Additional Comments: Pt donned/doffed B socks with SUP while seated in a chair this date. Pt to require Min A for all standing balance tasks including LB ADLs this date. UE Function           LUE Strength  Gross LUE Strength: WFL  L Hand Grasp: 4/5  LUE Strength Comment: Shoulder 4/5, elbow 4/5     LUE Tone: Normotonic     LUE AROM (degrees)  LUE AROM : WFL     Left Hand AROM (degrees)  Left Hand AROM: WFL  RUE Strength  Gross RUE Strength: WFL  R Hand Grasp: 4/5  RUE Strength Comment: Shoulder 4/5, elbow 4/5      RUE Tone: Normotonic     RUE AROM (degrees)  RUE AROM : WFL     Right Hand AROM (degrees)  Right Hand AROM: WFL    Fine Motor Skills  Coordination  Movements Are Fluid And Coordinated: Yes                           Mobility          Balance  Standing Balance: Minimal assistance  Standing Balance  Time: 2-3 minutes  Activity: mobility & standing therapeutic rest breaks  Functional Mobility  Functional - Mobility Device: Rolling Walker  Activity: Other(in room and hallway)  Assist Level: Minimal assistance  Functional Mobility Comments: VCs for safety  Bed mobility  Comment: Pt seated in chair at start and end of OT session     Transfers  Sit to stand: Moderate assistance  Stand to sit: Minimal assistance  Transfer Comments: VCs for hand placement and safety  Functional Activity Tolerance  Functional Activity Tolerance: Tolerates 30 min exercise with multiple rests   Assessment  Performance deficits / Impairments: Decreased functional mobility , Decreased ADL status, Decreased strength, Decreased safe awareness, Decreased endurance, Decreased balance, Decreased high-level IADLs  Treatment Diagnosis: Impaired self-care status.   Prognosis: Good  Decision Making: Medium Complexity  History: moderate chart review  Exam: 7 performance deficits  Assistance / Modification: Min/moderate assist  Patient Education: OT POC, d/c rec of SNF, d/c rec of 24/7 SUP & HH OT & use of RW if refuses SNF  REQUIRES OT FOLLOW UP: Yes  Discharge Recommendations: Subacute/Skilled Nursing Facility(24/7 Needed: (TBD)  OT Individual Minutes  Time In: 3908  Time Out: [address]  Minutes: 33    Electronically signed by Gigi Camp OT on 5/6/19 at 2:09 PM

## 2019-05-06 NOTE — PROGRESS NOTES
Physical Therapy    Facility/Department: [facility] MED SURG  Initial Assessment    NAME: Giovanni Hernandez  : 1932  MRN: [id number]    Date of Service: 2019    Discharge Recommendations:  Subacute/Skilled Nursing Facility(If chooses to go home recommend  assist, Home PT and use RW for mobility.)        Assessment   Body structures, Functions, Activity limitations: Decreased functional mobility ; Decreased ROM; Decreased strength;Decreased balance; Increased Pain  Assessment: Pt needs assist for mobility, R knee pain limiting activity. Pt not safe to perform functional mobility without assist. Pt is fall risk. Treatment Diagnosis: Impaired mobility  Prognosis: Good  Decision Making: Medium Complexity  Patient Education: PT POC/GOAL, benefits of SNF to improve mobility, decrease fall risk . REQUIRES PT FOLLOW UP: Yes  Activity Tolerance  Activity Tolerance: Patient limited by pain; Patient limited by fatigue;Patient limited by endurance       Patient Diagnosis(es): The primary encounter diagnosis was Urinary retention. Diagnoses of Gross hematuria and Septicemia ([address] were also pertinent to this visit. has a past medical history of Arthritis, Diabetic neuropathy ([address] First degree AV block, Full dentures, H/O dizziness, Hematuria, Hyperlipidemia, Hypertension, Malignant neoplasm of urinary bladder ([address] RBBB, Tongue cancer ([address] Type II or unspecified type diabetes mellitus without mention of complication, not stated as uncontrolled, and Wears glasses. has a past surgical history that includes Appendectomy (s); Cholecystectomy (); Tongue Biopsy (2014); bronchoscopy; Colonoscopy; Clavicle surgery (Right, 194); Tonsillectomy; Gastrostomy tube placement (2014); Umbilical hernia repair; Tunneled venous port placement (Left, 14); Cystocopy (2018); TURP (2018); pr office/outpt visit,procedure only (N/A, 3/23/2018);  Endoscopy, colon, diagnostic; Cystocopy (2019); perfrom bed mobility at [facility]   Short term goal 2:  Pt able to perfrm sit tostnad at [facility] term goal 3:  Pt able to ambulate with RW dist of 80 ft x 2, [facility]   Short term goal 4:  Pt able to ambulate without RW, dist of 20 ft x 2 , min A   Short term goal 5: Decrease R knee pain to < 5/10 to improve fucntional mobility. Patient Goals   Patient goals : \"R knee to work better to walk better\".        Therapy Time   Individual Concurrent Group Co-treatment   Time In 7265         Time Out 1307         Minutes 33         Timed Code Treatment Minutes: [facility]       Albert Siddiqui, PT

## 2019-05-06 NOTE — PROGRESS NOTES
[facility] will accept pt. Wife will take pt to facility. She will also bring methotrexate to facility. This worker completed . Await completed orders.

## 2019-05-06 NOTE — DISCHARGE INSTR - COC
Continuity of Care Form    Patient Name: Martha Martinez   :  1932  MRN:  [id number]    Admit date:  5/3/2019  Discharge date:  ***    Code Status Order: Full Code   Advance Directives:   Advance Care Flowsheet Documentation     Date/Time Healthcare Directive Type of Healthcare Directive Copy in [address] Agent's Name Healthcare Agent's Phone Number    19  --  --  --  --  [phone]  Americo Renaeas    19  Yes, patient has an advance directive for healthcare treatment  Durable power of  for health care;Living will  Yes, copy in chart  Healthcare power of   --  --          Admitting Physician:  eMlani Hillman MD  PCP: Melani Hillman MD    Discharging Nurse: [facility] Unit/Room#: 2066/2066-01  Discharging Unit Phone Number: ***    Emergency Contact:   Extended Emergency Contact Information  Primary Emergency Contact: Hailey Beckett  Address: [address] [place] [address], [address] [place] [address] Phone: [phone]  Work Phone: [phone]  Mobile Phone: [phone]  Relation: Spouse  Secondary Emergency Contact: [address] [place] [address], [address] [place] [address] Phone: [phone]  Work Phone: [phone]  Mobile Phone: [phone]  Relation: Child    Past Surgical History:  Past Surgical History:   Procedure Laterality Date    APPENDECTOMY  '70's    [address] Right 1949    pins placed and removed    COLONOSCOPY      several polypectomy    CYSTOSCOPY  2018    CYSTOSCOPY  2019    turbt    CYSTOSCOPY N/A 2019    CYSTOSCOPY, TUR-BLADDER TUMOR, GYRUS , URETHRAL DILATATION performed by Yuliana Stephens MD at [facility], COLON, [address]  2014    with EGD;  removed    [place] OFFICE/OUTPT VISIT,PROCEDURE ONLY N/A 3/23/2018    CYSTOSCOPY, TUR BLADDER TUMOR WITH GYRUS performed by Yuliana Stephens MD at [place] [address]  TONGUE BIOPSY  11/2014    chemo Tx.     TONSILLECTOMY      TUNNELED VENOUS PORT PLACEMENT Left 11/17/14    chemo port insertion; left chest    TURP  03/23/2018    TURP  05/01/2019    cystoscopy    TURP N/A 5/1/2019    CYSTOSCOPY, TUR-PROSTATE GREENLIGHT XPS LASER performed by Beto House MD at [facility]      umbilical repair       Immunization History:   Immunization History   Administered Date(s) Administered    Pneumococcal Conjugate 7-valent 12/02/1999       Active Problems:  Patient Active Problem List   Diagnosis Code    Type 2 diabetes mellitus with neurologic complication ([facility]) P58.12    Diabetic polyneuropathy ([facility] E11.42    Disorder of arteries and arterioles ([facility]) I77.9    Memory loss R41.3    Tongue cancer ([facility] C02.9    Anorexia R63.0    Weight loss R63.4    Anxiety F41.9    Rash R21    Oral mucositis K12.30    Bradycardia R00.1    Chronic combined systolic and diastolic congestive heart failure ([facility]) I50.42    History of tobacco abuse Z87.891    Rheumatoid arteritis I00    Slow transit constipation K59.01    Orthostatic hypotension I95.1    Malignant neoplasm of dome of urinary bladder ([facility]) C67.1    Right inguinal hernia K40.90    Bladder cancer ([facility]) C67.9    Hematuria due to cystitis N30.91    Severe malnutrition ([facility]) E43       Isolation/Infection:   Isolation          No Isolation            Nurse Assessment:  Last Vital Signs: BP (!) 160/57   Pulse 55   Temp 98.3 °F (36.8 °C) (Oral)   Resp 16   Ht 6' 0.5\" (1.842 m)   Wt 177 lb (80.3 kg)   SpO2 96%   BMI 23.68 kg/m²     Last documented pain score (0-10 scale): Pain Level: 8  Last Weight:   Wt Readings from Last 1 Encounters:   05/03/19 177 lb (80.3 kg)     Mental Status:  oriented and alert    IV Access:  - None    Nursing Mobility/ADLs:  Walking   Assisted  Transfer  Assisted  Bathing  Assisted  Dressing  Assisted  Toileting  Assisted  Feeding  Independent  Med Admin Chintan Paynejohn  · [address]  · Phone: [phone]  · Fax: [phone]    Dialysis Facility (if applicable)   · Name:  · Address:  · Dialysis Schedule:  · Phone:  · Fax:    / signature: Electronically signed by CATHY Carlisle on 5/6/19 at 3:46 PM    PHYSICIAN SECTION    Prognosis: Good    Condition at Discharge: Stable    Rehab Potential (if transferring to Rehab): Good    Recommended Labs or Other Treatments After Discharge: na    Physician Certification: I certify the above information and transfer of Latosha Uamna  is necessary for the continuing treatment of the diagnosis listed and that he requires [facility] for less 30 days.      Update Admission H&P: No change in H&P    PHYSICIAN SIGNATURE:  Electronically signed by Arnie Gongroa MD on 5/6/19 at 3:35 PM

## 2019-05-06 NOTE — PROGRESS NOTES
SW spoke to pt and wife. Couple decided on short stay at [facility]. SW sent referral and started 7000 in [address]. (wife wants pt home for their 40th anniversary party).

## 2019-05-06 NOTE — CARE COORDINATION
[address] Transitions Interview     2019    Patient: Merline Hazy Patient : 1932   MRN: [id number]  Reason for Admission: hematuria  RARS: Readmission Risk Score: 16         Spoke with: magali and his wife  Writer explained role of CTC provided contact information, plan is to go to [facility] when discharged, will follow//JU      Readmission Risk  Patient Active Problem List   Diagnosis    Type 2 diabetes mellitus with [facility]ic complication [facility]    Diabetic polyneuropathy ([facility]    Disorder of arteries and arterioles ([address]    Memory loss    Tongue cancer ([facility]    Anorexia    Weight loss    Anxiety    Rash    Oral mucositis    Bradycardia    Chronic combined systolic and diastolic congestive heart failure (HCC)    History of tobacco abuse    Rheumatoid arteritis    Slow transit constipation    Orthostatic hypotension    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)    Right inguinal hernia    Bladder cancer (HCC)    Hematuria due to cystitis    Severe malnutrition [facility])       Inpatient Assessment  Care Transitions Summary    Care Transitions Inpatient Review  Medication Review  Do you have all of your prescriptions and are they filled?:  Yes   Housing Review  Social Support  Durable Medical Equipment  Functional Review  Hearing and Vision  Care Transitions Interventions         Follow Up  Future Appointments   Date Time Provider Feng Thomas   6/10/2019 10:40 AM MD Nissa Wolfe [id number]   10/28/2019 11:40 AM Ava Sanchez MD [facility]  There are no preventive care reminders to display for this patient.     Kash Chatman RN

## 2019-05-06 NOTE — PROGRESS NOTES
Take 5 mg by mouth Daily with supper    Yes Historical Provider, MD   HYDROcodone-acetaminophen (NORCO) 5-325 MG per tablet Take 1 tablet by mouth every 6 hours as needed for Pain for up to 5 days. May take 2 tablets if needed 5/1/19 5/6/19  Lucy Calles PA-C   Ascorbic Acid (VITAMIN C) 500 MG CHEW Take 500 mg by mouth daily    Historical Provider, MD   Lancets MISC 1 each by Does not apply route daily 1/26/16   Lieutenant Bebeto MD   glucose monitoring kit (FREESTYLE) monitoring kit 1 kit by Does not apply route daily as needed 1/26/16   Lieutenant Bebeto MD   Insulin Pen Needle (PEN NEEDLES 31GX5/16\") 31G X 8 MM MISC 1 each by Does not apply route 2 times daily. Historical Provider, MD        Allergies:     Patient has no known allergies. Social History:     Tobacco:    reports that he quit smoking about 27 years ago. His smoking use included cigarettes. He started smoking about 71 years ago. He smoked 2.00 packs per day. He has never used smokeless tobacco.  Alcohol:      reports that he does not drink alcohol. Drug Use:  reports that he does not use drugs. Family History:     Family History   Problem Relation Age of Onset    Heart Failure Father     Arthritis Father     No Known Problems Maternal Grandmother     No Known Problems Maternal Grandfather     No Known Problems Paternal Grandmother     No Known Problems Paternal Grandfather        Review of Systems:     Positive and Negative as described in HPI. CONSTITUTIONAL:  negative for fevers, chills, sweats, fatigue, weight loss  HEENT:  negative for vision, hearing changes, runny nose, throat pain  RESPIRATORY:  negative for shortness of breath, cough, congestion, wheezing. CARDIOVASCULAR:  negative for chest pain, palpitations.   GASTROINTESTINAL:  negative for nausea, vomiting, diarrhea, constipation, change in bowel habits, abdominal pain   GENITOURINARY: Admitted with across hematuria no pain no fever INTEGUMENT:  negative for Testing:  Recent Results (from the past 24 hour(s))   Hgb/Hct    Collection Time: 05/05/19  4:02 PM   Result Value Ref Range    Hemoglobin 10.7 (L) 13.5 - 17.5 g/dL    Hematocrit 32.2 (L) 41 - 53 %   POC Glucose Fingerstick    Collection Time: 05/05/19  5:14 PM   Result Value Ref Range    POC Glucose 198 (H) 75 - 110 mg/dL   POC Glucose Fingerstick    Collection Time: 05/05/19  8:58 PM   Result Value Ref Range    POC Glucose 220 (H) 75 - 110 mg/dL   Hgb/Hct    Collection Time: 05/05/19 11:32 PM   Result Value Ref Range    Hemoglobin 10.1 (L) 13.5 - 17.5 g/dL    Hematocrit 31.2 (L) 41 - 53 %   POC Glucose Fingerstick    Collection Time: 05/06/19  8:11 AM   Result Value Ref Range    POC Glucose 180 (H) 75 - 110 mg/dL   Hgb/Hct    Collection Time: 05/06/19  8:23 AM   Result Value Ref Range    Hemoglobin 11.9 (L) 13.5 - 17.5 g/dL    Hematocrit 36.0 (L) 41 - 53 %   POC Glucose Fingerstick    Collection Time: 05/06/19 11:08 AM   Result Value Ref Range    POC Glucose 268 (H) 75 - 110 mg/dL       Recent Labs     05/06/19  0823 05/03/19  1555   HGB 11.9*   < > 12.6*   HCT 36.0*   < > 39.0*   WBC  --   --  15.5*   MCV  --   --  91.9   NA  --   --  138   K  --   --  4.8   CL  --   --  98   CO2  --   --  26   BUN  --   --  31*   CREATININE  --   --  0.83   GLUCOSE  --   --  306*   INR  --   --  1.0   PROTIME  --   --  13.6   AST  --   --  23   ALT  --   --  15   LABALBU  --   --  4.1    < > = values in this interval not displayed. Hematology:  Recent Labs     05/03/19  1555  05/05/19  1602 05/05/19  2332 05/06/19  0823   WBC 15.5*  --   --   --   --    RBC 4.25*  --   --   --   --    HGB 12.6*   < > 10.7* 10.1* 11.9*   HCT 39.0*   < > 32.2* 31.2* 36.0*   MCV 91.9  --   --   --   --    MCH 29.7  --   --   --   --    MCHC 32.3  --   --   --   --    RDW 16.3*  --   --   --   --      --   --   --   --    MPV 11.2  --   --   --   --    INR 1.0  --   --   --   --     < > = values in this interval not displayed. Chemistry:  Recent Labs     05/03/19  1555      K 4.8   CL 98   CO2 26   GLUCOSE 306*   BUN 31*   CREATININE 0.83   ANIONGAP 14   LABGLOM >60   GFRAA >60   CALCIUM 8.8     Recent Labs     05/03/19  1555  05/05/19  0701 05/05/19  0850 05/05/19  1053 05/05/19  1714 05/05/19  2058 05/06/19  0811 05/06/19  1108   PROT 6.5  --   --   --   --   --   --   --   --    LABALBU 4.1  --   --   --   --   --   --   --   --    LABA1C  --   --   --  7.0*  --   --   --   --   --    AST 23  --   --   --   --   --   --   --   --    ALT 15  --   --   --   --   --   --   --   --    ALKPHOS 67  --   --   --   --   --   --   --   --    BILITOT 1.64*  --   --   --   --   --   --   --   --    POCGLU  --    < > 162*  --  241* 198* 220* 180* 268*    < > = values in this interval not displayed. Imaging/Diagnostics:       No results found. Impressions :      1. Active Problems:    Hematuria due to cystitis    Severe malnutrition (HCC)  Resolved Problems:    * No resolved hospital problems. *    Bladder irrigation in progress        2.  has a past medical history of Arthritis, Diabetic neuropathy ([facility] [address] First degree AV block, Full dentures, H/O dizziness, Hematuria (03/2018), Hyperlipidemia, Hypertension, Malignant neoplasm of urinary bladder ([facility] [address] (03/2018), RBBB, [facility] (11/2014), Type II or unspecified type diabetes mellitus without mention of complication, not stated as uncontrolled, and Wears glasses.      Plans:     Patient with a history of a bladder cancer status post greenlight laser for prostate admitted for a gross hematuria  Three-way catheter with irrigation  Urology input noted  Diabetes mellitus uncontrolled meds reviewed and adjusted  May 5  Hematuria resolved irrigation has been stopped urine output is low 30 ML's an hour patient feels very fatigued and wants to urine output hydrate OT and PT reevaluated tomorrow for discharge    Current Facility-Administered Medications   Medication Dose Route Frequency Provider Last Rate Last Dose    docusate sodium (COLACE) capsule 200 mg  200 mg Oral BID Noam Shin MD   200 mg at 05/06/19 0726    vitamin C (ASCORBIC ACID) tablet 500 mg  500 mg Oral Daily Jalen Romero MD   500 mg at 05/06/19 0726    HYDROcodone-acetaminophen (NORCO) 5-325 MG per tablet 1 tablet  1 tablet Oral Q6H PRN Jalen Romero MD        methotrexate (RHEUMATREX) chemo tablet 15 mg  15 mg Oral Once per day on Sat Jalen Romero MD   15 mg at 05/04/19 1640    glimepiride (AMARYL) tablet 2 mg  2 mg Oral QAM AC Jalen Romero MD   2 mg at 05/06/19 0629    glucose (GLUTOSE) 40 % oral gel 15 g  15 g Oral PRN Ana Daniel MD        dextrose 50 % solution 12.5 g  12.5 g Intravenous PRN Ana Daniel MD        glucagon (rDNA) injection 1 mg  1 mg Intramuscular PRN Ana Daniel MD        dextrose 5 % solution  100 mL/hr Intravenous SURYAN Ana Daniel MD        0.9 % sodium chloride infusion   Intravenous Continuous Sonido Jimenez  mL/hr at 05/03/19 1629 125 mL/hr at 05/03/19 1629    sodium chloride flush 0.9 % injection 10 mL  10 mL Intravenous 2 times per day Jalen Romero MD   10 mL at 05/05/19 0837    sodium chloride flush 0.9 % injection 10 mL  10 mL Intravenous PRN Jalen Romero MD        acetaminophen (TYLENOL) tablet 650 mg  650 mg Oral Q4H PRN Jalen Romero MD        0.9 % sodium chloride infusion   Intravenous Continuous Jalen Romero  mL/hr at 05/06/19 0732      clotrimazole-betamethasone (LOTRISONE) cream   Topical BID PRN Ana Daniel MD        folic acid (FOLVITE) tablet 1 mg  1 mg Oral Travis Daniel MD   1 mg at 05/05/19 1731    lisinopril (PRINIVIL;ZESTRIL) tablet 5 mg  5 mg Oral Dinner Ana Daniel MD   5 mg at 05/05/19 1731    simvastatin (ZOCOR) tablet 20 mg  20 mg Oral Nightly Ana Daniel MD   20 mg at 05/05/19 2102    tamsulosin (FLOMAX) capsule 0.4 mg  0.4 mg Oral Daily Ana Daniel MD   0.4 mg at 05/06/19 0726    cefTRIAXone (ROCEPHIN) 1 g IVPB in 50 mL D5W minibag  1 g Intravenous Q24H Artemus Epley, MD   Stopped at 05/05/19 1705    insulin lispro (HUMALOG) injection vial 0-6 Units  0-6 Units Subcutaneous TID WC Artemus Epley, MD   3 Units at 05/06/19 1116    insulin lispro (HUMALOG) injection vial 0-3 Units  0-3 Units Subcutaneous Nightly Artemus Epley, MD   1 Units at 05/05/19 2102          Chandler Kirby MD  5/6/2019  1:30 PM

## 2019-05-06 NOTE — PROGRESS NOTES
Writer attempted to call report, but was placed on call as they were unaware of the room number. Writer waited for five minutes on hold. Will attempt later.

## 2019-05-07 ENCOUNTER — HOSPITAL ENCOUNTER (OUTPATIENT)
Age: 84
Setting detail: SPECIMEN
Discharge: HOME | End: 2019-05-07
Payer: MEDICARE

## 2019-05-07 LAB
ABSOLUTE EOS #: 0.26 K/UL (ref 0–0.44)
ABSOLUTE IMMATURE GRANULOCYTE: 0.03 K/UL (ref 0–0.3)
ABSOLUTE LYMPH #: 0.82 K/UL (ref 1.1–3.7)
ABSOLUTE MONO #: 0.45 K/UL (ref 0.1–1.2)
BASOPHILS # BLD: 1 % (ref 0–2)
BASOPHILS ABSOLUTE: 0.06 K/UL (ref 0–0.2)
DIFFERENTIAL TYPE: ABNORMAL
EOSINOPHILS RELATIVE PERCENT: 3 % (ref 1–4)
HCT VFR BLD CALC: 33.1 % (ref 40.7–50.3)
HEMOGLOBIN: 10.6 G/DL (ref 13–17)
IMMATURE GRANULOCYTES: 0 %
LYMPHOCYTES # BLD: 10 % (ref 24–43)
MCH RBC QN AUTO: 29.9 PG (ref 25.2–33.5)
MCHC RBC AUTO-ENTMCNC: 32 G/DL (ref 28.4–34.8)
MCV RBC AUTO: 93.5 FL (ref 82.6–102.9)
MONOCYTES # BLD: 5 % (ref 3–12)
NRBC AUTOMATED: 0 PER 100 WBC
PDW BLD-RTO: 15 % (ref 11.8–14.4)
PLATELET # BLD: ABNORMAL K/UL (ref 138–453)
PLATELET ESTIMATE: ABNORMAL
PLATELET, FLUORESCENCE: 193 K/UL (ref 138–453)
PLATELET, IMMATURE FRACTION: 10.9 % (ref 1.1–10.3)
PMV BLD AUTO: ABNORMAL FL (ref 8.1–13.5)
RBC # BLD: 3.54 M/UL (ref 4.21–5.77)
RBC # BLD: ABNORMAL 10*6/UL
SEG NEUTROPHILS: 81 % (ref 36–65)
SEGMENTED NEUTROPHILS ABSOLUTE COUNT: 7.04 K/UL (ref 1.5–8.1)
WBC # BLD: 8.7 K/UL (ref 3.5–11.3)
WBC # BLD: ABNORMAL 10*3/UL

## 2019-05-07 PROCEDURE — 36415 COLL VENOUS BLD VENIPUNCTURE: CPT

## 2019-05-07 PROCEDURE — P9603 ONE-WAY ALLOW PRORATED MILES: HCPCS

## 2019-05-07 PROCEDURE — 85055 RETICULATED PLATELET ASSAY: CPT

## 2019-05-07 PROCEDURE — 85025 COMPLETE CBC W/AUTO DIFF WBC: CPT

## 2019-05-07 NOTE — DISCHARGE SUMMARY
Significant Diagnostic Studies:   Labs / Micro:       Results for orders placed or performed during the hospital encounter of 05/03/19   Urine culture clean catch   Result Value Ref Range    Specimen Description . CATHETERIZED URINE     Special Requests NOT REPORTED     Culture NO GROWTH    Culture Blood #1   Result Value Ref Range    Specimen Description . BLOOD RIGHT ARM, 10 ML LAV, 10 ML RED     Special Requests NOT REPORTED     Culture NO GROWTH 3 DAYS    Culture Blood #1   Result Value Ref Range    Specimen Description . BLOOD LEFT ARM, 9 ML LAV, 1 ML RED     Special Requests NOT REPORTED     Culture NO GROWTH 3 DAYS    Urinalysis   Result Value Ref Range    Color, UA RED (A) YELLOW    Turbidity UA TURBID (A) CLEAR    Glucose, Ur 1+ (A) NEGATIVE    Bilirubin Urine (A) NEGATIVE     Presumptive positive. Unable to confirm due to unavailability of reagent.     Ketones, Urine TRACE (A) NEGATIVE    Specific [place], UA 1.021 1.000 - 1.030    Urine Hgb LARGE (A) NEGATIVE    pH, UA 6.0 5.0 - 8.0    Protein, UA 4+ (A) NEGATIVE    Urobilinogen, Urine Normal Normal    Nitrite, Urine NEGATIVE NEGATIVE    Leukocyte Esterase, Urine MOD (A) NEGATIVE    Urinalysis Comments NOT REPORTED    CBC Auto Differential   Result Value Ref Range    WBC 15.5 (H) 3.5 - 11.0 k/uL    RBC 4.25 (L) 4.5 - 5.9 m/uL    Hemoglobin 12.6 (L) 13.5 - 17.5 g/dL    Hematocrit 39.0 (L) 41 - 53 %    MCV 91.9 80 - 100 fL    MCH 29.7 26 - 34 pg    MCHC 32.3 31 - 37 g/dL    RDW 16.3 (H) 11.5 - 14.9 %    Platelets 275 450 - 731 k/uL    MPV 11.2 6.0 - 12.0 fL    NRBC Automated NOT REPORTED per 100 WBC    Differential Type NOT REPORTED     Immature Granulocytes NOT REPORTED 0 %    Absolute Immature Granulocyte NOT REPORTED 0.00 - 0.30 k/uL    WBC Morphology NOT REPORTED     RBC Morphology NOT REPORTED     Platelet Estimate NOT REPORTED     Seg Neutrophils 94 (H) 36 - 66 %    Lymphocytes 2 (L) 24 - 44 %    Monocytes 4 1 - 7 %    Eosinophils % 0 0 - 4 % Basophils 0 0 - 2 %    Segs Absolute 14.57 (H) 1.3 - 9.1 k/uL    Absolute Lymph # 0.31 (L) 1.0 - 4.8 k/uL    Absolute Mono # 0.62 0.1 - 1.3 k/uL    Absolute Eos # 0.00 0.0 - 0.4 k/uL    Basophils # 0.00 0.0 - 0.2 k/uL    Morphology ANISOCYTOSIS PRESENT    Comprehensive Metabolic Panel   Result Value Ref Range    Glucose 306 (H) 70 - 99 mg/dL    BUN 31 (H) 8 - 23 mg/dL    CREATININE 0.83 0.70 - 1.20 mg/dL    Bun/Cre Ratio NOT REPORTED 9 - 20    Calcium 8.8 8.6 - 10.4 mg/dL    Sodium 138 135 - 144 mmol/L    Potassium 4.8 3.7 - 5.3 mmol/L    Chloride 98 98 - 107 mmol/L    CO2 26 20 - 31 mmol/L    Anion Gap 14 9 - 17 mmol/L    Alkaline Phosphatase 67 40 - 129 U/L    ALT 15 5 - 41 U/L    AST 23 <40 U/L    Total Bilirubin 1.64 (H) 0.3 - 1.2 mg/dL    Total Protein 6.5 6.4 - 8.3 g/dL    Alb 4.1 3.5 - 5.2 g/dL    Albumin/Globulin Ratio NOT REPORTED 1.0 - 2.5    GFR Non-African American >60 >60 mL/min    GFR African American >60 >60 mL/min    GFR Comment          GFR Staging NOT REPORTED    Protime-INR   Result Value Ref Range    Protime 13.6 11.8 - 14.6 sec    INR 1.0    Lactic Acid   Result Value Ref Range    Lactic Acid 1.6 0.5 - 2.2 mmol/L   Microscopic Urinalysis   Result Value Ref Range    -          WBC, UA 50  /HPF    RBC, UA TOO NUMEROUS TO COUNT /HPF    Casts UA NOT REPORTED /LPF    Crystals UA NOT REPORTED None /HPF    Epithelial Cells UA 0 TO 2 /HPF    Renal Epithelial, Urine NOT REPORTED 0 /HPF    Bacteria, UA FEW (A) None    Mucus, UA 2+ (A) None    Trichomonas, UA NOT REPORTED None    Amorphous, UA NOT REPORTED None    Other Observations UA (A) NOT REQ. INTERPRET WITH CAUTION DUE TO INTENSE COLOR OF URINE.     Yeast, UA NOT REPORTED None   Hgb/Hct   Result Value Ref Range    Hemoglobin 11.2 (L) 13.5 - 17.5 g/dL    Hematocrit 34.5 (L) 41 - 53 %   Hgb/Hct   Result Value Ref Range    Hemoglobin 12.7 (L) 13.5 - 17.5 g/dL    Hematocrit 38.5 (L) 41 - 53 %   Hgb/Hct   Result Value Ref Range    Hemoglobin 11.4 (L) 13.5 - 17.5 g/dL    Hematocrit 34.7 (L) 41 - 53 %   Hgb/Hct   Result Value Ref Range    Hemoglobin 11.4 (L) 13.5 - 17.5 g/dL    Hematocrit 34.3 (L) 41 - 53 %   Hgb/Hct   Result Value Ref Range    Hemoglobin 11.6 (L) 13.5 - 17.5 g/dL    Hematocrit 36.5 (L) 41 - 53 %   Hemoglobin A1C   Result Value Ref Range    Hemoglobin A1C 7.0 (H) 4.0 - 6.0 %    Estimated Avg Glucose 154 mg/dL   Hgb/Hct   Result Value Ref Range    Hemoglobin 10.7 (L) 13.5 - 17.5 g/dL    Hematocrit 32.2 (L) 41 - 53 %   Hgb/Hct   Result Value Ref Range    Hemoglobin 10.1 (L) 13.5 - 17.5 g/dL    Hematocrit 31.2 (L) 41 - 53 %   Hgb/Hct   Result Value Ref Range    Hemoglobin 11.9 (L) 13.5 - 17.5 g/dL    Hematocrit 36.0 (L) 41 - 53 %   POC Glucose Fingerstick   Result Value Ref Range    POC Glucose 252 (H) 75 - 110 mg/dL   POC Glucose Fingerstick   Result Value Ref Range    POC Glucose 90 75 - 110 mg/dL   POC Glucose Fingerstick   Result Value Ref Range    POC Glucose 189 (H) 75 - 110 mg/dL   POC Glucose Fingerstick   Result Value Ref Range    POC Glucose 209 (H) 75 - 110 mg/dL   POC Glucose Fingerstick   Result Value Ref Range    POC Glucose 138 (H) 75 - 110 mg/dL   POC Glucose Fingerstick   Result Value Ref Range    POC Glucose 162 (H) 75 - 110 mg/dL   POC Glucose Fingerstick   Result Value Ref Range    POC Glucose 241 (H) 75 - 110 mg/dL   POC Glucose Fingerstick   Result Value Ref Range    POC Glucose 198 (H) 75 - 110 mg/dL   POC Glucose Fingerstick   Result Value Ref Range    POC Glucose 220 (H) 75 - 110 mg/dL   POC Glucose Fingerstick   Result Value Ref Range    POC Glucose 180 (H) 75 - 110 mg/dL   POC Glucose Fingerstick   Result Value Ref Range    POC Glucose 268 (H) 75 - 110 mg/dL   TYPE AND SCREEN   Result Value Ref Range    Expiration Date 05/06/2019     Arm Band Number [id number]     ABO/Rh A POSITIVE     Antibody Screen NEGATIVE         {Radiology:    No results found.      Consultations:    Consults:     Final Specialist Recommendations/Findings:   IP CONSULT TO INTERNAL MEDICINE  IP CONSULT TO UROLOGY      The patient was seen and examined on day of discharge and this discharge summary is in conjunction with any daily progress note from day of discharge. Discharge plan:     Disposition: Skilled Facility    Physician Follow Up: With PCP and as specified     Requiring Further Evaluation/Follow Up POST HOSPITALIZATION/Incidental Findings:    Diet: regular     Activity: As tolerated    I  Discharge Medications:      Medication List      START taking these medications    cefUROXime 500 MG tablet  Commonly known as:  CEFTIN  Take 1 tablet by mouth 2 times daily for 7 days     docusate 100 MG Caps  Commonly known as:  COLACE, DULCOLAX  Take 200 mg by mouth 2 times daily     HYDROcodone-acetaminophen 5-325 MG per tablet  Commonly known as:  NORCO  Take 1 tablet by mouth every 6 hours as needed for Pain for up to 2 days. CONTINUE taking these medications    CENTRUM SILVER PO     clotrimazole-betamethasone 1-0.05 % cream  Commonly known as:  LOTRISONE  Apply topically to head of penis (retract foreskin) once a day.      folic acid 1 MG tablet  Commonly known as:  FOLVITE     glimepiride 2 MG tablet  Commonly known as:  AMARYL  Take 1 tablet by mouth every morning (before breakfast)     glucose monitoring kit monitoring kit  1 kit by Does not apply route daily as needed     Lancets Misc  1 each by Does not apply route daily     lisinopril 5 MG tablet  Commonly known as:  PRINIVIL;ZESTRIL     methotrexate 2.5 MG chemo tablet  Commonly known as:  RHEUMATREX     PEN NEEDLES 31GX5/16\" 31G X 8 MM Misc     simvastatin 20 MG tablet  Commonly known as:  ZOCOR  TAKE ONE TABLET BY MOUTH ONCE DAILY     tamsulosin 0.4 MG capsule  Commonly known as:  FLOMAX  TAKE 1 CAPSULE BY MOUTH ONCE DAILY WITH  SUPPER     Vitamin C 500 MG Chew        STOP taking these medications    blood glucose test strips strip  Commonly known as:  TRUETEST TEST

## 2019-05-09 LAB
CULTURE: NORMAL
CULTURE: NORMAL
Lab: NORMAL
Lab: NORMAL
SPECIMEN DESCRIPTION: NORMAL
SPECIMEN DESCRIPTION: NORMAL

## 2019-05-15 ENCOUNTER — OFFICE VISIT (OUTPATIENT)
Dept: UROLOGY | Age: 84
End: 2019-05-15
Payer: MEDICARE

## 2019-05-15 VITALS
SYSTOLIC BLOOD PRESSURE: 186 MMHG | DIASTOLIC BLOOD PRESSURE: 68 MMHG | HEIGHT: 74 IN | WEIGHT: 172.2 LBS | TEMPERATURE: 98 F | BODY MASS INDEX: 22.1 KG/M2

## 2019-05-15 DIAGNOSIS — R33.9 INCOMPLETE BLADDER EMPTYING: Primary | ICD-10-CM

## 2019-05-15 PROCEDURE — 99024 POSTOP FOLLOW-UP VISIT: CPT | Performed by: NURSE PRACTITIONER

## 2019-05-15 PROCEDURE — 51798 US URINE CAPACITY MEASURE: CPT | Performed by: NURSE PRACTITIONER

## 2019-05-15 PROCEDURE — 51700 IRRIGATION OF BLADDER: CPT | Performed by: NURSE PRACTITIONER

## 2019-05-15 ASSESSMENT — ENCOUNTER SYMPTOMS
EYE REDNESS: 0
COLOR CHANGE: 0
COUGH: 0
BACK PAIN: 0
WHEEZING: 0
NAUSEA: 0
ABDOMINAL PAIN: 0
SHORTNESS OF BREATH: 0
EYE PAIN: 0
VOMITING: 0

## 2019-05-15 NOTE — PROGRESS NOTES
Fill and pull procedure  Risks and Benefits discussed with patient prior to procedure. Procedure Date: 5/15/19    Verbal consent obtained from patient prior to procedure. Patient arrived with old 22F urethral catheter in place for fill and pull. Bladder installation of 550 ml of sterile water per gravity until patient felt the sensation of bladder capacity fullness. Curtis ballon deflated and curtis removed without complication. Attempted to void with return of 70ml . Patient tolerated procedure well and without complications. Encouraged to stay well hydrated. Verbalized understanding. Florencio Blakely
nodes, CHEMO AND RADIATION    Type II or unspecified type diabetes mellitus without mention of complication, not stated as uncontrolled     on Insulin    Wears glasses      Past Surgical History:   Procedure Laterality Date    APPENDECTOMY  '70's    BRONCHOSCOPY      CHOLECYSTECTOMY  1981    CLAVICLE SURGERY Right 1949    pins placed and removed    COLONOSCOPY      several polypectomy    CYSTOSCOPY  03/23/2018    CYSTOSCOPY  02/22/2019    turbt    CYSTOSCOPY N/A 2/22/2019    CYSTOSCOPY, TUR-BLADDER TUMOR, GYRUS , URETHRAL DILATATION performed by Alverto Hong MD at [facility], COLON, [address]  11/25/2014    with EGD;  removed    DC OFFICE/OUTPT VISIT,PROCEDURE ONLY N/A 3/23/2018    CYSTOSCOPY, TUR BLADDER TUMOR WITH GYRUS performed by Alverto Hong MD at [address] [facility]  11/2014    chemo Tx.     TONSILLECTOMY      TUNNELED VENOUS PORT PLACEMENT Left 11/17/14    chemo port insertion; left chest    TURP  03/23/2018    TURP  05/01/2019    cystoscopy    TURP N/A 5/1/2019    CYSTOSCOPY, TUR-PROSTATE GREENLIGHT XPS LASER performed by Alverto Hong MD at [address]      umbilical repair     Family History   Problem Relation Age of Onset    Heart Failure Father     Arthritis Father     No Known Problems Maternal Grandmother     No Known Problems Maternal Grandfather     No Known Problems Paternal Grandmother     No Known Problems Paternal Grandfather      Outpatient Medications Marked as Taking for the 5/15/19 encounter (Office Visit) with BEBO Gaines - CNP   Medication Sig Dispense Refill    docusate sodium (COLACE, DULCOLAX) 100 MG CAPS Take 200 mg by mouth 2 times daily 120 capsule 0    glimepiride (AMARYL) 2 MG tablet Take 1 tablet by mouth every morning (before breakfast) 90 tablet 1    Multiple Vitamins-Minerals (CENTRUM SILVER PO) Take 1 tablet by mouth daily      clotrimazole-betamethasone

## 2019-05-20 ENCOUNTER — OFFICE VISIT (OUTPATIENT)
Dept: UROLOGY | Age: 84
End: 2019-05-20
Payer: MEDICARE

## 2019-05-20 ENCOUNTER — HOSPITAL ENCOUNTER (OUTPATIENT)
Age: 84
Setting detail: SPECIMEN
Discharge: HOME | End: 2019-05-20
Payer: MEDICARE

## 2019-05-20 VITALS
TEMPERATURE: 98 F | HEART RATE: 82 BPM | BODY MASS INDEX: 22.8 KG/M2 | SYSTOLIC BLOOD PRESSURE: 120 MMHG | WEIGHT: 172 LBS | DIASTOLIC BLOOD PRESSURE: 74 MMHG | HEIGHT: 73 IN

## 2019-05-20 DIAGNOSIS — N13.8 HYPERTROPHY OF PROSTATE WITH URINARY OBSTRUCTION: ICD-10-CM

## 2019-05-20 DIAGNOSIS — N40.1 HYPERTROPHY OF PROSTATE WITH URINARY OBSTRUCTION: ICD-10-CM

## 2019-05-20 DIAGNOSIS — R30.0 DYSURIA: ICD-10-CM

## 2019-05-20 DIAGNOSIS — R30.0 DYSURIA: Primary | ICD-10-CM

## 2019-05-20 LAB
APPEARANCE FLUID: ABNORMAL
BILIRUBIN, POC: ABNORMAL
BLOOD URINE, POC: ABNORMAL
CLARITY, POC: ABNORMAL
COLOR, POC: ABNORMAL
GLUCOSE URINE, POC: ABNORMAL
KETONES, POC: ABNORMAL
LEUKOCYTE EST, POC: ABNORMAL
NITRITE, POC: ABNORMAL
PH, POC: ABNORMAL
PROTEIN, POC: ABNORMAL
SPECIFIC GRAVITY, POC: ABNORMAL
UROBILINOGEN, POC: ABNORMAL

## 2019-05-20 PROCEDURE — 51798 US URINE CAPACITY MEASURE: CPT | Performed by: UROLOGY

## 2019-05-20 PROCEDURE — 99024 POSTOP FOLLOW-UP VISIT: CPT | Performed by: UROLOGY

## 2019-05-20 PROCEDURE — 81002 URINALYSIS NONAUTO W/O SCOPE: CPT | Performed by: UROLOGY

## 2019-05-20 ASSESSMENT — ENCOUNTER SYMPTOMS
EYE REDNESS: 0
NAUSEA: 0
COLOR CHANGE: 0
EYE PAIN: 0
COUGH: 0
BACK PAIN: 0
WHEEZING: 0
ABDOMINAL PAIN: 0
SHORTNESS OF BREATH: 0
VOMITING: 0

## 2019-05-20 NOTE — PROGRESS NOTES
Review of Systems   Constitutional: Negative for appetite change, chills and fever. Eyes: Negative for pain, redness and visual disturbance. Respiratory: Negative for cough, shortness of breath and wheezing. Cardiovascular: Negative for chest pain and leg swelling. Gastrointestinal: Negative for abdominal pain, nausea and vomiting. Genitourinary: Positive for difficulty urinating, frequency and hematuria. Negative for dysuria, flank pain, testicular pain and urgency. Musculoskeletal: Negative for back pain, joint swelling and myalgias. Skin: Negative for color change, rash and wound. Neurological: Positive for tremors. Negative for dizziness, weakness, numbness and headaches. Hematological: Negative for adenopathy. Does not bruise/bleed easily.

## 2019-05-20 NOTE — PROGRESS NOTES
[facility]  [facility] UROLOGY SPECIALISTS - [place]  [address]  [address]  [address] [facility] [address]  Dept: 92 Sandra Dwyer [facility] Urology Office Note - Established    Patient:  Sue Nam  YOB: 1932  Date: 5/20/2019    The patient is a 80 y.o. male who presents todayfor evaluation of the following problems:   Chief Complaint   Patient presents with    Follow-up     PVR       HPI  Sp pvp. Was in clot retention.   Now voiding much better    Summary of old records: N/A    Additional History: N/A    Procedures Today: N/A    Urinalysis today:  Results for POC orders placed in visit on 05/20/19   POCT Urine Dipstick   Result Value Ref Range    Color, UA      Clarity, UA      Glucose, UA POC 1000+++     Bilirubin, UA      Ketones, UA      Spec Grav, UA      Blood, UA POC +++     pH, UA      Protein, UA POC ++     Urobilinogen, UA      Leukocytes, UA 71+     Nitrite, UA +     Appearance, Fluid  Clear, Slightly Cloudy     Last several PSA's:  Lab Results   Component Value Date    PSA 2.96 04/24/2013     Last total testosterone:  No results found for: TESTOSTERONE    AUA Symptom Score (5/20/2019):  INCOMPLETE EMPTYING: How often have you had the sensation of not emptying your bladder?: Not at all  FREQUENCY: How often do you have to urinate less than every two hours?: Less than 1 to 5 times  INTERMITTENCY: How often have you found you stopped and started again several times when you urinated?: Not at all  URGENCY: How often have you found it difficult to postpone urination?: Not at all  WEAK STREAM: How often have you had a weak urinary stream?: Less than Half the time  STRAINING: How often have you had to strain to start  urination?: Not at all  NOCTURIA: How many times did you typically get up at night to uriniate?: 4 Times  TOTAL I-PSS SCORE[de-identified] 7  How would you feel if you were to spend the rest of your life with your urinary condition?: Mixe    Last BUN and creatinine:  Lab Results Component Value Date    BUN 31 (H) 05/03/2019     Lab Results   Component Value Date    CREATININE 0.83 05/03/2019       Additional Lab/Culture results: none    Imaging Reviewed during this Office Visit: none  (results were independently reviewed by physician and radiology report verified)    PAST MEDICAL, FAMILY AND SOCIAL HISTORY UPDATE:  Past Medical History:   Diagnosis Date    Arthritis     Diabetic neuropathy ([facility] [address]     First degree AV block     Full dentures     Upper & Lower    H/O dizziness     being [facility]    Hematuria 03/2018    Hyperlipidemia     Hypertension     Malignant neoplasm of urinary bladder ([facility] [address] 03/2018    RBBB     Dr. 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, CHEMO AND RADIATION    Type II or unspecified type diabetes mellitus without mention of complication, not stated as uncontrolled     on Insulin    Wears glasses      Past Surgical History:   Procedure Laterality Date    APPENDECTOMY  '70's   [address]    CLAVICLE SURGERY Right 1949    pins placed and removed    COLONOSCOPY      several polypectomy    CYSTOSCOPY  03/23/2018    CYSTOSCOPY  02/22/2019    turbt    CYSTOSCOPY N/A 2/22/2019    CYSTOSCOPY, TUR-BLADDER TUMOR, GYRUS , URETHRAL DILATATION performed by Hemanth Franklin MD at [address] [place] [address], [address]  11/25/2014    with EGD;  removed    CT OFFICE/OUTPT VISIT,PROCEDURE ONLY N/A 3/23/2018    CYSTOSCOPY, TUR BLADDER TUMOR WITH GYRUS performed by Hemanth Franklin MD at [address] [facility]  11/2014    chemo Tx.     TONSILLECTOMY      TUNNELED VENOUS PORT PLACEMENT Left 11/17/14    chemo port insertion; left chest    TURP  03/23/2018    TURP  05/01/2019    cystoscopy    TURP N/A 5/1/2019    CYSTOSCOPY, TUR-PROSTATE GREENLIGHT XPS LASER performed by Hemanth Fraknlin MD at [address] [facility] Family History   Problem Relation Age of Onset    Heart Failure Father     Arthritis Father     No Known Problems Maternal Grandmother     No Known Problems Maternal Grandfather     No Known Problems Paternal Grandmother     No Known Problems Paternal Grandfather      Outpatient Medications Marked as Taking for the 19 encounter (Office Visit) with Eddie Guardado MD   Medication Sig Dispense Refill    docusate sodium (COLACE, DULCOLAX) 100 MG CAPS Take 200 mg by mouth 2 times daily 120 capsule 0    glimepiride (AMARYL) 2 MG tablet Take 1 tablet by mouth every morning (before breakfast) 90 tablet 1    Multiple Vitamins-Minerals (CENTRUM SILVER PO) Take 1 tablet by mouth daily      clotrimazole-betamethasone (LOTRISONE) 1-0.05 % cream Apply topically to head of penis (retract foreskin) once a day. 1 Tube 0    Ascorbic Acid (VITAMIN C) 500 MG CHEW Take 500 mg by mouth daily      simvastatin (ZOCOR) 20 MG tablet TAKE ONE TABLET BY MOUTH ONCE DAILY 90 tablet 3    tamsulosin (FLOMAX) 0.4 MG capsule TAKE 1 CAPSULE BY MOUTH ONCE DAILY WITH  SUPPER 90 capsule 3    methotrexate (RHEUMATREX) 2.5 MG chemo tablet TK 6 TS PO ONCE A WEEK  2    folic acid (FOLVITE) 1 MG tablet Take 1 mg by mouth Daily with supper      lisinopril (PRINIVIL;ZESTRIL) 5 MG tablet Take 5 mg by mouth Daily with supper       Lancets MISC 1 each by Does not apply route daily 100 each 3    glucose monitoring kit (FREESTYLE) monitoring kit 1 kit by Does not apply route daily as needed 1 kit 0    Insulin Pen Needle (PEN NEEDLES 31GX5/16\") 31G X 8 MM MISC 1 each by Does not apply route 2 times daily. Patient has no known allergies.   Social History     Tobacco Use   Smoking Status Former Smoker    Packs/day: 2.00    Types: Cigarettes    Start date: 56    Last attempt to quit: 1991    Years since quittin.5   Smokeless Tobacco Never Used     (Ifpatient a smoker, smoking cessation counseling offered)    Social History     Substance and Sexual Activity   Alcohol Use No    Alcohol/week: 0.0 oz       REVIEW OF SYSTEMS:  Review of Systems    Physical Exam:      Vitals:    05/20/19 1125   BP: 120/74   Pulse: 82   Temp: 98 °F (36.7 °C)     Body mass index is 22.69 kg/m². Patient is a 80 y.o. male in no acute distress and alert and oriented to person, place and time. Physical Exam  Constitutional: Patient in no acute distress. Neuro: Alert and oriented to person, place and time. Psych: Mood normal, affect normal  Skin: No rash noted  HEENT: Head: Normocephalic andatraumatic  Conjunctivae and EOM are normal. Pupils are equal, round  Nose:Normal  Right External Ear: Normal; Left External Ear: Normal  Mouth: Mucosa Moist  Neck: Supple  Lungs: Respiratory effort is normal  Cardiovascular: Warm & Pink  Abdomen: Soft, non-tender, non-distended with no CVA,  No flank tenderness,  Or hepatosplenomegaly   Lymphatics: No palpablelymphadenopathy. Bladder non-tender and not distended. Assessment and Plan      1. Dysuria    2. Hypertrophy of prostate with urinary obstruction           Plan:   F/u 3 mo for cysto      Return in about 3 months (around 8/20/2019) for Cystoscopy. Prescriptions Ordered:  No orders of the defined types were placed in this encounter. Orders Placed:  Orders Placed This Encounter   Procedures   Vini Valentino MD    Agree with the ROS entered by the MA.

## 2019-05-21 LAB
-: ABNORMAL
AMORPHOUS: ABNORMAL
BACTERIA: ABNORMAL
BILIRUBIN URINE: NEGATIVE
CASTS UA: ABNORMAL /LPF (ref 0–8)
COLOR: YELLOW
COMMENT UA: ABNORMAL
CRYSTALS, UA: ABNORMAL /HPF
EPITHELIAL CELLS UA: ABNORMAL /HPF (ref 0–5)
GLUCOSE URINE: ABNORMAL
KETONES, URINE: NEGATIVE
LEUKOCYTE ESTERASE, URINE: ABNORMAL
MUCUS: ABNORMAL
NITRITE, URINE: NEGATIVE
OTHER OBSERVATIONS UA: ABNORMAL
PH UA: 7.5 (ref 5–8)
PROTEIN UA: ABNORMAL
RBC UA: ABNORMAL /HPF (ref 0–4)
RENAL EPITHELIAL, UA: ABNORMAL /HPF
SPECIFIC GRAVITY UA: 1.02 (ref 1–1.03)
TRICHOMONAS: ABNORMAL
TURBIDITY: ABNORMAL
URINE HGB: ABNORMAL
UROBILINOGEN, URINE: NORMAL
WBC UA: ABNORMAL /HPF (ref 0–5)
YEAST: ABNORMAL

## 2019-05-22 ENCOUNTER — OFFICE VISIT (OUTPATIENT)
Dept: INTERNAL MEDICINE CLINIC | Age: 84
End: 2019-05-22
Payer: MEDICARE

## 2019-05-22 VITALS
WEIGHT: 169 LBS | HEIGHT: 73 IN | DIASTOLIC BLOOD PRESSURE: 70 MMHG | SYSTOLIC BLOOD PRESSURE: 138 MMHG | BODY MASS INDEX: 22.4 KG/M2

## 2019-05-22 DIAGNOSIS — R26.81 UNSTEADY GAIT: ICD-10-CM

## 2019-05-22 DIAGNOSIS — E11.40 TYPE 2 DIABETES MELLITUS WITH DIABETIC NEUROPATHY, UNSPECIFIED WHETHER LONG TERM INSULIN USE (HCC): ICD-10-CM

## 2019-05-22 DIAGNOSIS — I77.9 DISORDER OF ARTERIES AND ARTERIOLES (HCC): ICD-10-CM

## 2019-05-22 DIAGNOSIS — I50.42 CHRONIC COMBINED SYSTOLIC AND DIASTOLIC CONGESTIVE HEART FAILURE (HCC): ICD-10-CM

## 2019-05-22 DIAGNOSIS — E08.42 DIABETIC POLYNEUROPATHY ASSOCIATED WITH DIABETES MELLITUS DUE TO UNDERLYING CONDITION (HCC): ICD-10-CM

## 2019-05-22 DIAGNOSIS — K59.00 CONSTIPATION, UNSPECIFIED CONSTIPATION TYPE: Primary | ICD-10-CM

## 2019-05-22 DIAGNOSIS — R42 DIZZINESS: ICD-10-CM

## 2019-05-22 DIAGNOSIS — E43 SEVERE MALNUTRITION (HCC): ICD-10-CM

## 2019-05-22 LAB
CULTURE: ABNORMAL
Lab: ABNORMAL
SPECIMEN DESCRIPTION: ABNORMAL

## 2019-05-22 PROCEDURE — 1036F TOBACCO NON-USER: CPT | Performed by: INTERNAL MEDICINE

## 2019-05-22 PROCEDURE — 1123F ACP DISCUSS/DSCN MKR DOCD: CPT | Performed by: INTERNAL MEDICINE

## 2019-05-22 PROCEDURE — G8427 DOCREV CUR MEDS BY ELIG CLIN: HCPCS | Performed by: INTERNAL MEDICINE

## 2019-05-22 PROCEDURE — 1111F DSCHRG MED/CURRENT MED MERGE: CPT | Performed by: INTERNAL MEDICINE

## 2019-05-22 PROCEDURE — 99214 OFFICE O/P EST MOD 30 MIN: CPT | Performed by: INTERNAL MEDICINE

## 2019-05-22 PROCEDURE — G8420 CALC BMI NORM PARAMETERS: HCPCS | Performed by: INTERNAL MEDICINE

## 2019-05-22 PROCEDURE — 4040F PNEUMOC VAC/ADMIN/RCVD: CPT | Performed by: INTERNAL MEDICINE

## 2019-05-22 ASSESSMENT — ENCOUNTER SYMPTOMS
TROUBLE SWALLOWING: 0
DIARRHEA: 0
COLOR CHANGE: 0
COUGH: 0
BLOOD IN STOOL: 0
EYE PAIN: 0
SHORTNESS OF BREATH: 0
EYE DISCHARGE: 0
ABDOMINAL DISTENTION: 0
WHEEZING: 0

## 2019-05-22 ASSESSMENT — PATIENT HEALTH QUESTIONNAIRE - PHQ9
SUM OF ALL RESPONSES TO PHQ9 QUESTIONS 1 & 2: 0
SUM OF ALL RESPONSES TO PHQ QUESTIONS 1-9: 0
SUM OF ALL RESPONSES TO PHQ QUESTIONS 1-9: 0
2. FEELING DOWN, DEPRESSED OR HOPELESS: 0
1. LITTLE INTEREST OR PLEASURE IN DOING THINGS: 0

## 2019-05-22 NOTE — PROGRESS NOTES
Subjective:      Visit Information    Have you changed or started any medications since your last visit including any over-the-counter medicines, vitamins, or herbal medicines? no   Have you stopped taking any of your medications? Is so, why? -  no  Are you having any side effects from any of your medications? - no    Have you seen any other physician or provider since your last visit? yes - SNF, neuro   Have you had any other diagnostic tests since your last visit? yes - MRI   Have you been seen in the emergency room and/or had an admission in a hospital since we last saw you?  yes - [facility]   Have you had your routine dental cleaning in the past 6 months?  no     Do you have an active MyChart account? If no, what is the barrier? Yes    Patient Care Team:  Val Nicole MD as PCP - General (Internal Medicine)  Val Nicole MD as PCP - S Attributed Provider  Natasha Lakhani MD as Consulting Physician (Hematology and Oncology)  Latrell Villareal MD as Physician (Radiation Oncology)  Peggy Guaman MD as Consulting Physician (Otolaryngology)  Skye Gaffney MD as Surgeon (Cardiology)  Sabino Bates LPN as Care Transition    Medical History Review  Past Medical, Family, and Social History reviewed and does not contribute to the patient presenting condition    Health Maintenance   Topic Date Due    DTaP/Tdap/Td vaccine (1 - Tdap) 12/11/1951    Shingles Vaccine (1 of 2) 12/11/1982    Pneumococcal 65+ years Vaccine (1 of 2 - PCV13) 12/11/1997    Flu vaccine (Season Ended) 09/01/2019    Potassium monitoring  05/03/2020    Creatinine monitoring  05/03/2020             Patient ID: Sue Nam is a 80 y.o. male. Unsteady gaet  No falls  dizzien on standing        Review of Systems   Constitutional: Negative for appetite change, diaphoresis and fatigue. HENT: Negative for ear discharge and trouble swallowing. Eyes: Negative for pain and discharge.    Respiratory: Negative for cough, shortness of breath and wheezing. Cardiovascular: Negative for chest pain and palpitations. Gastrointestinal: Negative for abdominal distention, blood in stool and diarrhea. Endocrine: Negative for polydipsia and polyphagia. Genitourinary: Negative for difficulty urinating and frequency. Musculoskeletal: Negative for gait problem, myalgias and neck pain. Skin: Negative for color change and rash. Allergic/Immunologic: Negative for environmental allergies and food allergies. Neurological: Positive for dizziness. Negative for headaches. Unsteady gate   Hematological: Negative for adenopathy. Does not bruise/bleed easily. Psychiatric/Behavioral: Negative for behavioral problems and sleep disturbance. Objective:   Physical Exam   Constitutional: He is oriented to person, place, and time. He appears well-developed and well-nourished. HENT:   Head: Normocephalic and atraumatic. Eyes: Conjunctivae and EOM are normal. Right eye exhibits no discharge. Left eye exhibits no discharge. Right conjunctiva is not injected. Left conjunctiva is not injected. Right eye exhibits normal extraocular motion. Left eye exhibits normal extraocular motion. Neck: Normal range of motion. Neck supple. No JVD present. No edema and no erythema present. No thyroid mass and no thyromegaly present. Cardiovascular: Normal rate and regular rhythm. Exam reveals no friction rub. No murmur heard. Pulmonary/Chest: Effort normal and breath sounds normal. No accessory muscle usage. No tachypnea and no bradypnea. No respiratory distress. He has no wheezes. He has no rales. Abdominal: Soft. Bowel sounds are normal. He exhibits no distension. There is no tenderness. There is no rebound. Musculoskeletal: Normal range of motion. He exhibits no edema or tenderness. Lymphadenopathy:        Head (right side): No submental and no submandibular adenopathy present.         Head (left side): No submental and no Hematuria 03/2018    Hyperlipidemia     Hypertension     Malignant neoplasm of urinary bladder ([facility] [address] 03/2018    RBBB     Dr. 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, CHEMO AND RADIATION    Type II or unspecified type diabetes mellitus without mention of complication, not stated as uncontrolled     on Insulin    Wears glasses       Past Surgical History:   Procedure Laterality Date    APPENDECTOMY  '70's    BRONCHOSCOPY      CHOLECYSTECTOMY  1981    CLAVICLE SURGERY Right 1949    pins placed and removed    COLONOSCOPY      several polypectomy    CYSTOSCOPY  03/23/2018    CYSTOSCOPY  02/22/2019    turbt    CYSTOSCOPY N/A 2/22/2019    CYSTOSCOPY, TUR-BLADDER TUMOR, GYRUS , URETHRAL DILATATION performed by Jessica Carter MD at [address], [place], [address]  11/25/2014    with EGD;  removed    ID OFFICE/OUTPT VISIT,PROCEDURE ONLY N/A 3/23/2018    CYSTOSCOPY, TUR BLADDER TUMOR WITH GYRUS performed by Jessica Carter MD at [address] [facility]  11/2014    chemo Tx.     TONSILLECTOMY      TUNNELED VENOUS PORT PLACEMENT Left 11/17/14    chemo port insertion; left chest    TURP  03/23/2018    TURP  05/01/2019    cystoscopy    TURP N/A 5/1/2019    CYSTOSCOPY, TUR-PROSTATE GREENLIGHT XPS LASER performed by Jessica Carter MD at [address] [facility]      umbilical repair     Family History   Problem Relation Age of Onset    Heart Failure Father     Arthritis Father     No Known Problems Maternal Grandmother     No Known Problems Maternal Grandfather     No Known Problems Paternal Grandmother     No Known Problems Paternal Grandfather      Current Outpatient Medications   Medication Sig Dispense Refill    linaclotide (LINZESS) 145 MCG capsule Take 1 capsule by mouth every morning (before breakfast) 30 capsule 1    docusate sodium (COLACE, DULCOLAX) 100 MG CAPS Take 200 mg by mouth 2 times daily 120 capsule 0    glimepiride (AMARYL) 2 MG tablet Take 1 tablet by mouth every morning (before breakfast) 90 tablet 1    Multiple Vitamins-Minerals (CENTRUM SILVER PO) Take 1 tablet by mouth daily      clotrimazole-betamethasone (LOTRISONE) 1-0.05 % cream Apply topically to head of penis (retract foreskin) once a day. 1 Tube 0    Ascorbic Acid (VITAMIN C) 500 MG CHEW Take 500 mg by mouth daily      simvastatin (ZOCOR) 20 MG tablet TAKE ONE TABLET BY MOUTH ONCE DAILY 90 tablet 3    tamsulosin (FLOMAX) 0.4 MG capsule TAKE 1 CAPSULE BY MOUTH ONCE DAILY WITH  SUPPER 90 capsule 3    methotrexate (RHEUMATREX) 2.5 MG chemo tablet TK 6 TS PO ONCE A WEEK  2    folic acid (FOLVITE) 1 MG tablet Take 1 mg by mouth Daily with supper      lisinopril (PRINIVIL;ZESTRIL) 5 MG tablet Take 5 mg by mouth Daily with supper       Lancets MISC 1 each by Does not apply route daily 100 each 3    glucose monitoring kit (FREESTYLE) monitoring kit 1 kit by Does not apply route daily as needed 1 kit 0    Insulin Pen Needle (PEN NEEDLES 31GX5/16\") 31G X 8 MM MISC 1 each by Does not apply route 2 times daily. No current facility-administered medications for this visit. ALLERGIES:  No Known Allergies    Social History     Tobacco Use    Smoking status: Former Smoker     Packs/day: 2.00     Types: Cigarettes     Start date:      Last attempt to quit: 1991     Years since quittin.5    Smokeless tobacco: Never Used   Substance Use Topics    Alcohol use: No     Alcohol/week: 0.0 oz      Body mass index is 22.3 kg/m².   /70   Ht 6' 0.99\" (1.854 m)   Wt 169 lb (76.7 kg)   BMI 22.30 kg/m²     Lab Results   Component Value Date     2019    K 4.8 2019    CL 98 2019    CO2 26 2019    BUN 31 2019    CREATININE 0.83 2019    GLUCOSE 306 2019    CALCIUM 8.8 2019    PROT 6.5 2019    LABALBU 4.1 2019    BILITOT 1.64 2019    ALKPHOS 67 2019    AST 23 05/03/2019    ALT 15 05/03/2019       Lab Results   Component Value Date    WBC 8.7 05/07/2019    RBC 3.54 05/07/2019    HGB 10.6 05/07/2019    HCT 33.1 05/07/2019    MCV 93.5 05/07/2019    MCH 29.9 05/07/2019    MCHC 32.0 05/07/2019    RDW 15.0 05/07/2019    PLT See Reflexed IPF Result 05/07/2019    MPV NOT REPORTED 05/07/2019       Lab Results   Component Value Date    LABA1C 7.0 05/05/2019       Lab Results   Component Value Date    HDL 37 11/26/2013    LDLCHOLESTEROL 68 11/26/2013       Assessment / Plan:      Diagnosis Orders   1. Constipation, unspecified constipation type     2. Disorder of arteries and arterioles ([facility] [address]     3. Diabetic polyneuropathy associated with diabetes mellitus due to underlying condition ([facility] [address]     4. Type 2 diabetes mellitus with diabetic neuropathy, unspecified whether long term insulin use ([facility] [address]     5. Severe malnutrition ([facility] [address]     6. Chronic combined systolic and diastolic congestive heart failure (HCC)       No follow-ups on file. No orders of the defined types were placed in this encounter.     Orders Placed This Encounter   Medications    linaclotide (LINZESS) 145 MCG capsule     Sig: Take 1 capsule by mouth every morning (before breakfast)     Dispense:  30 capsule     Refill:  1        Poor gait  Wide based  No falls  Pt hs order for mri neck  Has seen dr Fantasma Rivers for this before  Advised to get mri neck  tsh noted  No change in meds at this time

## 2019-05-23 ENCOUNTER — CARE COORDINATION (OUTPATIENT)
Dept: CASE MANAGEMENT | Age: 84
End: 2019-05-23

## 2019-05-23 DIAGNOSIS — N39.0 URINARY TRACT INFECTION WITHOUT HEMATURIA, SITE UNSPECIFIED: Primary | ICD-10-CM

## 2019-05-23 RX ORDER — NITROFURANTOIN 25; 75 MG/1; MG/1
100 CAPSULE ORAL 2 TIMES DAILY
Qty: 14 CAPSULE | Refills: 0 | Status: SHIPPED | OUTPATIENT
Start: 2019-05-23 | End: 2019-05-30

## 2019-05-23 NOTE — CARE COORDINATION
Care Transition Discharge from [facility] Follow Up     Call within 2 business days of discharge: No      Spoke with Cindi Palmer, patient  Discharged From: [facility]  Date of discharge: 5/17/19    Spoke with patient for [facility] follow up. He states he is doing fine but still very weak. Denies having any abdominal pain, burning sensation odor. He states he has a UTI and was started on antibiotic which he will start today. He reports that he has urinary urgency and continues to have retention. Patient receiving home health services. PT and OT will be starting tomorrow. Continues to use a cane now and then. Appetite is good. He continues to eat three meals a day. Reviewed medication list.  1111f completed. Patient aware to contact MD with changes or concerns. No questions or concerns at this time. 1. How have you been feeling since you were discharged from the post acute facility? He states he is doing fine but still very weak. Any intervention needed? None at this time. 2. Do you have all of your medications? Yes    3. Do you have any questions about your medicatons? No    4. Have you scheduled your follow up appointment? Yes, had follow up on 5/20/19 with urology and 5/22/19 with PCP. How are you going to get there? Wife    5. Is Home Health involved: Yes   [address]: [place]      Has someone from home health been in contact with you? Yes        Who else is helping you at home? Wife        Does anyone in your home depend on you for help? No    6. Do you feel like you have everything you need to keep you well at home? Yes         DME?  cane      Follow up appointments:    Future Appointments   Date Time Provider Feng Thomas   5/31/2019 10:45 AM [facility] MRI  [facility]   6/26/2019 10:45 AM MD Yosi Shin [facility]   8/26/2019 10:45 AM MD Yosi Contreras [facility]ro [facility]   10/28/2019 11:40 AM Niecy Bain MD [address] [place] [address],

## 2019-05-30 ENCOUNTER — HOSPITAL ENCOUNTER (OUTPATIENT)
Age: 84
Setting detail: SPECIMEN
Discharge: HOME | End: 2019-05-30
Payer: MEDICARE

## 2019-05-30 ENCOUNTER — OFFICE VISIT (OUTPATIENT)
Dept: UROLOGY | Age: 84
End: 2019-05-30
Payer: MEDICARE

## 2019-05-30 VITALS
HEIGHT: 76 IN | DIASTOLIC BLOOD PRESSURE: 86 MMHG | BODY MASS INDEX: 20.9 KG/M2 | TEMPERATURE: 98.3 F | HEART RATE: 70 BPM | SYSTOLIC BLOOD PRESSURE: 134 MMHG | WEIGHT: 171.6 LBS

## 2019-05-30 DIAGNOSIS — N39.0 URINARY TRACT INFECTION WITHOUT HEMATURIA, SITE UNSPECIFIED: ICD-10-CM

## 2019-05-30 DIAGNOSIS — N40.1 BENIGN PROSTATIC HYPERPLASIA WITH NOCTURIA: ICD-10-CM

## 2019-05-30 DIAGNOSIS — R35.1 NOCTURIA: ICD-10-CM

## 2019-05-30 DIAGNOSIS — R35.0 FREQUENCY OF URINATION: Primary | ICD-10-CM

## 2019-05-30 DIAGNOSIS — R35.1 BENIGN PROSTATIC HYPERPLASIA WITH NOCTURIA: ICD-10-CM

## 2019-05-30 DIAGNOSIS — R39.15 URINARY URGENCY: ICD-10-CM

## 2019-05-30 LAB
-: ABNORMAL
AMORPHOUS: ABNORMAL
BACTERIA: ABNORMAL
BILIRUBIN URINE: NEGATIVE
BILIRUBIN, POC: ABNORMAL
BLOOD URINE, POC: ABNORMAL
CASTS UA: ABNORMAL /LPF (ref 0–2)
CLARITY, POC: ABNORMAL
COLOR, POC: ABNORMAL
COLOR: YELLOW
COMMENT UA: ABNORMAL
CRYSTALS, UA: ABNORMAL /HPF
CRYSTALS, UA: ABNORMAL /HPF
EPITHELIAL CELLS UA: ABNORMAL /HPF (ref 0–5)
GLUCOSE URINE, POC: ABNORMAL
GLUCOSE URINE: ABNORMAL
KETONES, POC: ABNORMAL
KETONES, URINE: NEGATIVE
LEUKOCYTE EST, POC: ABNORMAL
LEUKOCYTE ESTERASE, URINE: ABNORMAL
MUCUS: ABNORMAL
NITRITE, POC: ABNORMAL
NITRITE, URINE: NEGATIVE
OTHER OBSERVATIONS UA: ABNORMAL
PH UA: 5.5 (ref 5–8)
PH, POC: ABNORMAL
PROTEIN UA: ABNORMAL
PROTEIN, POC: ABNORMAL
RBC UA: ABNORMAL /HPF (ref 0–2)
RENAL EPITHELIAL, UA: ABNORMAL /HPF
SPECIFIC GRAVITY UA: 1.02 (ref 1–1.03)
SPECIFIC GRAVITY, POC: ABNORMAL
TRICHOMONAS: ABNORMAL
TURBIDITY: ABNORMAL
URINE HGB: ABNORMAL
UROBILINOGEN, POC: ABNORMAL
UROBILINOGEN, URINE: NORMAL
WBC UA: ABNORMAL /HPF (ref 0–5)
YEAST: ABNORMAL

## 2019-05-30 PROCEDURE — 81002 URINALYSIS NONAUTO W/O SCOPE: CPT | Performed by: NURSE PRACTITIONER

## 2019-05-30 PROCEDURE — 99024 POSTOP FOLLOW-UP VISIT: CPT | Performed by: NURSE PRACTITIONER

## 2019-05-30 ASSESSMENT — ENCOUNTER SYMPTOMS
ABDOMINAL PAIN: 0
CONSTIPATION: 1
VOMITING: 0
EYE REDNESS: 0
SHORTNESS OF BREATH: 0
NAUSEA: 0
WHEEZING: 0
EYE PAIN: 0
DIARRHEA: 0
COUGH: 0
BACK PAIN: 1

## 2019-05-30 NOTE — LETTER
[facility]  [facility]OLOGY SPECIALISTS - [place]  [address]  [address]  [address] [facility] [address]  Dept: [phone]  Dept Fax: [phone]        5/30/19    Patient: Jesi Brown  YOB: 1932    Dear Minoo Torrez MD,    I had the pleasure of seeing one of your patients, Ketan Bellamy today in the office today. Below are the relevant portions of my assessment and plan of care. IMPRESSION:     1. Frequency of urination    2. Urinary urgency    3. Nocturia    4. Urinary tract infection without hematuria, site unspecified    5. Benign prostatic hyperplasia with nocturia        PLAN:   stay hydrated    Decrease fluids 2 hours before bed    Timed urination every 2 hours while awake    Routine post op follow up    Call or questions or concerns,     No follow-ups on file. Prescriptions Ordered:  No orders of the defined types were placed in this encounter. Orders Placed:  Orders Placed This Encounter   Procedures    Urinalysis Reflex to Culture     Order Specific Question:   SPECIFY(EX-CATH,MIDSTREAM,CYSTO,ETC)? Answer:   midstream    POCT Urinalysis no Micro        Thank you for allowing me to participate in the care of this patient. I will keep you updated on this patient's follow up and I look forward to serving you and your patients again in the future.     Dock Hamman, APRN - CNP

## 2019-05-30 NOTE — PROGRESS NOTES
Review of Systems   Constitutional: Negative for appetite change, chills and fatigue. Eyes: Negative for pain, redness and visual disturbance. Respiratory: Negative for cough, shortness of breath and wheezing. Cardiovascular: Negative for chest pain and leg swelling. Gastrointestinal: Positive for constipation (5-6 days). Negative for abdominal pain, diarrhea, nausea and vomiting. Genitourinary: Positive for difficulty urinating, dysuria (sharp pain ) and urgency. Negative for flank pain, frequency and hematuria. Musculoskeletal: Positive for back pain. Negative for joint swelling and myalgias. Skin: Negative for rash and wound. Neurological: Positive for dizziness and weakness. Negative for numbness. Hematological: Bruises/bleeds easily.

## 2019-05-30 NOTE — PATIENT INSTRUCTIONS
stay hydrated    Decrease fluids 2 hours before bed    Timed urination every 2 hours while awake    Routine post op follow up    Call or questions or concerns,

## 2019-05-30 NOTE — PROGRESS NOTES
[facility]  [facility] UROLOGY SPECIALISTS - [place]  [address]  [address]  [address] [facility] [address]  Dept: [address] [facility] Urology Office Note - Established    Patient:  Debbie Carbajal  YOB: 1932  Date: 5/30/2019    The patient is a 80 y.o. male who presents todayfor evaluation of the following problems:   Chief Complaint   Patient presents with    Follow-up     Incomplete bladder emptying       HPI  Here post GL 5/1/19. He feels he empties well, has urgency, weak stream, and nocturia. The urgency and nocturia is the most bothersome symptoms. He has no bladder or prostate pain, no hematuria, no dysuria.      Had staphy UTI- complete Macrobid     Summary of old records: N/A    Additional History: N/A    Procedures Today: N/A    Urinalysis today:  Results for POC orders placed in visit on 05/30/19   POCT Urinalysis no Micro   Result Value Ref Range    Color, UA DK yellow     Clarity, UA turbid     Glucose, UA POC neg     Bilirubin, UA      Ketones, UA      Spec Grav, UA      Blood, UA POC large     pH, UA      Protein, UA POC moderate     Urobilinogen, UA      Leukocytes, UA mod     Nitrite, UA neg      Last several PSA's:  Lab Results   Component Value Date    PSA 2.96 04/24/2013     Last total testosterone:  No results found for: TESTOSTERONE    AUA Symptom Score (5/30/2019):  INCOMPLETE EMPTYING: How often have you had the sensation of not emptying your bladder?: Not at all  FREQUENCY: How often do you have to urinate less than every two hours?: Less than Half the time  INTERMITTENCY: How often have you found you stopped and started again several times when you urinated?: Not at all  URGENCY: How often have you found it difficult to postpone urination?: Almost always  WEAK STREAM: How often have you had a weak urinary stream?: Less than 1 to 5 times  STRAINING: How often have you had to strain to start  urination?: Less than 1 to 5 times  NOCTURIA: How many times did you typically get up at night to uriniate?: 4 Times  TOTAL I-PSS SCORE[de-identified] 13  How would you feel if you were to spend the rest of your life with your urinary condition?: Mostly Dissatisfied    Last BUN and creatinine:  Lab Results   Component Value Date    BUN 31 (H) 05/03/2019     Lab Results   Component Value Date    CREATININE 0.83 05/03/2019       Additional Lab/Culture results:  Imaging Reviewed during this Office Visit:   (results were independently reviewed by physician and radiology report verified)    PAST MEDICAL, FAMILY AND SOCIAL HISTORY UPDATE:  Past Medical History:   Diagnosis Date    Arthritis     Diabetic neuropathy ([facility] [address]     First degree AV block     Full dentures     Upper & Lower    H/O dizziness     being worked up by [facility] neurology    Hematuria 03/2018    Hyperlipidemia     Hypertension     Malignant neoplasm of urinary bladder ([facility] [address] 03/2018    RBBB     Dr. 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, CHEMO AND RADIATION    Type II or unspecified type diabetes mellitus without mention of complication, not stated as uncontrolled     on Insulin    Wears glasses      Past Surgical History:   Procedure Laterality Date    APPENDECTOMY  '70's    [address]    CLAVICLE SURGERY Right 1949    pins placed and removed    COLONOSCOPY      several polypectomy    CYSTOSCOPY  03/23/2018    CYSTOSCOPY  02/22/2019    turbt    CYSTOSCOPY N/A 2/22/2019    CYSTOSCOPY, TUR-BLADDER TUMOR, GYRUS , URETHRAL DILATATION performed by Matt Augustin MD at [place] [address], COLON, [address]  11/25/2014    with EGD;  removed    GA OFFICE/OUTPT VISIT,PROCEDURE ONLY N/A 3/23/2018    CYSTOSCOPY, TUR BLADDER TUMOR WITH GYRUS performed by Matt Augustin MD at [id number] [place] [address]  11/2014    chemo Tx.     TONSILLECTOMY      TUNNELED VENOUS PORT PLACEMENT Left 11/17/14    chemo port insertion; left chest    TURP 03/23/2018    TURP  05/01/2019    cystoscopy    TURP N/A 5/1/2019    CYSTOSCOPY, TUR-PROSTATE GREENLIGHT XPS LASER performed by Alma Rosa Ventura MD at [address]      umbilical repair     Family History   Problem Relation Age of Onset    Heart Failure Father     Arthritis Father     No Known Problems Maternal Grandmother     No Known Problems Maternal Grandfather     No Known Problems Paternal Grandmother     No Known Problems Paternal Grandfather      Outpatient Medications Marked as Taking for the 5/30/19 encounter (Office Visit) with BEBO Real CNP   Medication Sig Dispense Refill    nitrofurantoin, macrocrystal-monohydrate, (MACROBID) 100 MG capsule Take 1 capsule by mouth 2 times daily for 7 days 14 capsule 0    linaclotide (LINZESS) 145 MCG capsule Take 1 capsule by mouth every morning (before breakfast) 30 capsule 1    docusate sodium (COLACE, DULCOLAX) 100 MG CAPS Take 200 mg by mouth 2 times daily 120 capsule 0    glimepiride (AMARYL) 2 MG tablet Take 1 tablet by mouth every morning (before breakfast) 90 tablet 1    Multiple Vitamins-Minerals (CENTRUM SILVER PO) Take 1 tablet by mouth daily      clotrimazole-betamethasone (LOTRISONE) 1-0.05 % cream Apply topically to head of penis (retract foreskin) once a day.  1 Tube 0    Ascorbic Acid (VITAMIN C) 500 MG CHEW Take 500 mg by mouth daily      simvastatin (ZOCOR) 20 MG tablet TAKE ONE TABLET BY MOUTH ONCE DAILY 90 tablet 3    tamsulosin (FLOMAX) 0.4 MG capsule TAKE 1 CAPSULE BY MOUTH ONCE DAILY WITH  SUPPER 90 capsule 3    methotrexate (RHEUMATREX) 2.5 MG chemo tablet TK 6 TS PO ONCE A WEEK  2    folic acid (FOLVITE) 1 MG tablet Take 1 mg by mouth Daily with supper      lisinopril (PRINIVIL;ZESTRIL) 5 MG tablet Take 5 mg by mouth Daily with supper       Lancets MISC 1 each by Does not apply route daily 100 each 3    glucose monitoring kit (FREESTYLE) monitoring kit 1 kit by Does not apply route daily as needed 1 kit 0    Insulin Pen Needle (PEN NEEDLES 31GX5/16\") 31G X 8 MM MISC 1 each by Does not apply route 2 times daily. Patient has no known allergies. Social History     Tobacco Use   Smoking Status Former Smoker    Packs/day: 2.00    Types: Cigarettes    Start date: 56    Last attempt to quit: 1991    Years since quittin.5   Smokeless Tobacco Never Used     (Ifpatient a smoker, smoking cessation counseling offered)    Social History     Substance and Sexual Activity   Alcohol Use No    Alcohol/week: 0.0 oz       REVIEW OF SYSTEMS:  Review of Systems    Physical Exam:      Vitals:    19 1113   BP: 134/86   Pulse: 70   Temp:      Body mass index is 20.89 kg/m². Patient is a 80 y.o. male in no acute distress and alert and oriented to person, place and time. Physical Exam  Constitutional: Patient in no acute distress. Neuro: Alert and oriented to person, place and time. Psych: Mood normal, affect normal  Skin: No rash noted  HEENT: Head: Normocephalic andatraumatic  Conjunctivae and EOM are normal. Pupils are equal, round  Nose:Normal  Right External Ear: Normal; Left External Ear: Normal  Mouth: Mucosa Moist  Neck: Supple  Lungs: Respiratory effort is normal  Cardiovascular: Warm & Pink  Abdomen: Soft, non-tender, non-distended with no CVA,  No flank tenderness,  Or hepatosplenomegaly   Lymphatics: No palpablelymphadenopathy. Bladder non-tender and not distended. Musculoskeletal: Normal gait and station  Testiscles: Normal, bilaterally  Prostate:    Assessment and Plan      1. Frequency of urination    2. Urinary urgency    3. Nocturia    4. Urinary tract infection without hematuria, site unspecified    5. Benign prostatic hyperplasia with nocturia           Plan:     stay hydrated    Decrease fluids 2 hours before bed    Timed urination every 2 hours while awake    Routine post op follow up    Call or questions or concerns,     No follow-ups on file.     Prescriptions Ordered:  No orders of the defined types were placed in this encounter. Orders Placed:  Orders Placed This Encounter   Procedures    Urinalysis Reflex to Culture     Order Specific Question:   SPECIFY(EX-CATH,MIDSTREAM,CYSTO,ETC)? Answer:   midstream    POCT Urinalysis no Micro           Karyle Amonate, APRN - CNP    Review and Agree with the ROS entered by the MA.

## 2019-05-31 ENCOUNTER — HOSPITAL ENCOUNTER (OUTPATIENT)
Dept: MRI IMAGING | Age: 84
Discharge: HOME | End: 2019-06-02
Payer: MEDICARE

## 2019-05-31 DIAGNOSIS — G95.9 CERVICAL MYELOPATHY (HCC): ICD-10-CM

## 2019-05-31 LAB
CULTURE: NORMAL
Lab: NORMAL
SPECIMEN DESCRIPTION: NORMAL

## 2019-05-31 PROCEDURE — 72141 MRI NECK SPINE W/O DYE: CPT

## 2019-06-26 ENCOUNTER — OFFICE VISIT (OUTPATIENT)
Dept: INTERNAL MEDICINE CLINIC | Age: 84
End: 2019-06-26
Payer: MEDICARE

## 2019-06-26 VITALS
WEIGHT: 167 LBS | BODY MASS INDEX: 20.34 KG/M2 | DIASTOLIC BLOOD PRESSURE: 60 MMHG | SYSTOLIC BLOOD PRESSURE: 130 MMHG | HEIGHT: 76 IN

## 2019-06-26 DIAGNOSIS — E08.42 DIABETIC POLYNEUROPATHY ASSOCIATED WITH DIABETES MELLITUS DUE TO UNDERLYING CONDITION (HCC): ICD-10-CM

## 2019-06-26 DIAGNOSIS — E11.40 TYPE 2 DIABETES MELLITUS WITH DIABETIC NEUROPATHY, WITHOUT LONG-TERM CURRENT USE OF INSULIN (HCC): Primary | ICD-10-CM

## 2019-06-26 DIAGNOSIS — K59.01 SLOW TRANSIT CONSTIPATION: ICD-10-CM

## 2019-06-26 DIAGNOSIS — I50.42 CHRONIC COMBINED SYSTOLIC AND DIASTOLIC CONGESTIVE HEART FAILURE (HCC): ICD-10-CM

## 2019-06-26 PROCEDURE — 99214 OFFICE O/P EST MOD 30 MIN: CPT | Performed by: INTERNAL MEDICINE

## 2019-06-26 PROCEDURE — 1036F TOBACCO NON-USER: CPT | Performed by: INTERNAL MEDICINE

## 2019-06-26 PROCEDURE — 1123F ACP DISCUSS/DSCN MKR DOCD: CPT | Performed by: INTERNAL MEDICINE

## 2019-06-26 PROCEDURE — 4040F PNEUMOC VAC/ADMIN/RCVD: CPT | Performed by: INTERNAL MEDICINE

## 2019-06-26 PROCEDURE — G8427 DOCREV CUR MEDS BY ELIG CLIN: HCPCS | Performed by: INTERNAL MEDICINE

## 2019-06-26 PROCEDURE — G8420 CALC BMI NORM PARAMETERS: HCPCS | Performed by: INTERNAL MEDICINE

## 2019-06-26 ASSESSMENT — ENCOUNTER SYMPTOMS
ABDOMINAL DISTENTION: 0
SHORTNESS OF BREATH: 0
COLOR CHANGE: 0
CONSTIPATION: 1
DIARRHEA: 0
TROUBLE SWALLOWING: 0
COUGH: 0
EYE PAIN: 0
EYE DISCHARGE: 0
WHEEZING: 0
BLOOD IN STOOL: 0

## 2019-06-26 NOTE — PROGRESS NOTES
Subjective:      Visit Information    Have you changed or started any medications since your last visit including any over-the-counter medicines, vitamins, or herbal medicines? no   Have you stopped taking any of your medications? Is so, why? -  no  Are you having any side effects from any of your medications? - no    Have you seen any other physician or provider since your last visit?  no   Have you had any other diagnostic tests since your last visit? yes - MRI   Have you been seen in the emergency room and/or had an admission in a hospital since we last saw you?  no   Have you had your routine dental cleaning in the past 6 months?  no     Do you have an active MyChart account? If no, what is the barrier? Yes    Patient Care Team:  Geovanna Short MD as PCP - General (Internal Medicine)  Geovanna Short MD as PCP - [facility]  Rowan Bautista MD as Consulting Physician (Hematology and Oncology)  Carmine Sexton MD as Physician (Radiation Oncology)  Kelly Wynn MD as Consulting Physician (Otolaryngology)  Homa Pettit MD as Surgeon (Cardiology)    Medical History Review  Past Medical, Family, and Social History reviewed and does not contribute to the patient presenting condition    Health Maintenance   Topic Date Due    DTaP/Tdap/Td vaccine (1 - Tdap) 12/11/1951    Shingles Vaccine (1 of 2) 12/11/1982    Pneumococcal 65+ years Vaccine (1 of 2 - PCV13) 12/11/1997    Annual Wellness Visit (AWV)  02/15/2017    Flu vaccine (Season Ended) 09/01/2019    Potassium monitoring  05/03/2020    Creatinine monitoring  05/03/2020             Patient ID: Bree Broderick is a 80 y.o. male.     Walks with short steps  No fall  No ataxia  dizzines on standing for few seconds    HTN  Onset more than 2 years ago  wagner mild to mod  Controlled with current po meds  Not associated with headaches or blurry vision  No chest pain        Review of Systems   Constitutional: Negative for appetite change, diaphoresis and fatigue. HENT: Negative for ear discharge and trouble swallowing. Eyes: Negative for pain and discharge. Respiratory: Negative for cough, shortness of breath and wheezing. Cardiovascular: Negative for chest pain and palpitations. Gastrointestinal: Positive for constipation. Negative for abdominal distention, blood in stool and diarrhea. Endocrine: Negative for polydipsia and polyphagia. Genitourinary: Negative for difficulty urinating and frequency. Musculoskeletal: Positive for arthralgias and neck pain. Negative for gait problem and myalgias. Skin: Negative for color change and rash. Allergic/Immunologic: Negative for environmental allergies and food allergies. Neurological: Negative for dizziness and headaches. Dizziness     Hematological: Negative for adenopathy. Does not bruise/bleed easily. Psychiatric/Behavioral: Negative for behavioral problems and sleep disturbance. Objective:   Physical Exam   Constitutional: He is oriented to person, place, and time. He appears well-developed and well-nourished. HENT:   Head: Normocephalic and atraumatic. Eyes: Conjunctivae and EOM are normal. Right eye exhibits no discharge. Left eye exhibits no discharge. Right conjunctiva is not injected. Left conjunctiva is not injected. Right eye exhibits normal extraocular motion. Left eye exhibits normal extraocular motion. Neck: Normal range of motion. Neck supple. No JVD present. No edema and no erythema present. No thyroid mass and no thyromegaly present. Cardiovascular: Normal rate and regular rhythm. Exam reveals no friction rub. No murmur heard. Pulmonary/Chest: Effort normal and breath sounds normal. No accessory muscle usage. No tachypnea and no bradypnea. No respiratory distress. He has no wheezes. He has no rales. Abdominal: Soft. Bowel sounds are normal. He exhibits no distension. There is no tenderness. There is no rebound.    Musculoskeletal: Normal range of motion. He exhibits no edema or tenderness. Walks with short steps  No fall  No ataxia  dizzines on standing for few seconds     Lymphadenopathy:        Head (right side): No submental and no submandibular adenopathy present. Head (left side): No submental and no submandibular adenopathy present. He has no cervical adenopathy. Neurological: He is alert and oriented to person, place, and time. He displays no atrophy. No cranial nerve deficit or sensory deficit. He exhibits normal muscle tone. Coordination normal.   Skin: Skin is warm. No bruising, no ecchymosis and no rash noted. He is not diaphoretic. No pallor. Psychiatric: He has a normal mood and affect. His behavior is normal. His mood appears not anxious. His affect is not angry. His speech is not slurred. He is not aggressive. Cognition and memory are not impaired. He expresses no homicidal ideation. I have personally reviewed and agree with the patient JOSE ARMANDO COLLINS Kelly Cervantes is a 80 y.o. male who presents today for follow up on his chronic medical conditions as noted below. Oscar Escobar is c/o of   Chief Complaint   Patient presents with    Neck Pain     MRI f/u    Constipation     pt has not had bowel movement in 4 days.         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 Fish Luo Tongue [facility]) 11/2014    mets to lymph nodes, CHEMO AND RADIATION    Type II or unspecified type diabetes mellitus without mention of complication, not stated as uncontrolled     on Insulin    Wears glasses       Past Surgical History:   Procedure Laterality Date    APPENDECTOMY  '70's    BRONCHOSCOPY      CHOLECYSTECTOMY  1981    CLAVICLE SURGERY Right 1949    pins placed and removed    COLONOSCOPY      several polypectomy    CYSTOSCOPY  03/23/2018    CYSTOSCOPY  02/22/2019    turbt    CYSTOSCOPY N/A 2/22/2019    CYSTOSCOPY, TUR-BLADDER TUMOR, GYRUS , URETHRAL DILATATION performed by Qasim Espinoza MD at [facility], COLON, [address]  11/25/2014    with EGD;  removed    MO OFFICE/OUTPT VISIT,PROCEDURE ONLY N/A 3/23/2018    CYSTOSCOPY, TUR BLADDER TUMOR WITH GYRUS performed by Qasim Espinoza MD at [address] [facility]  11/2014    chemo Tx.     TONSILLECTOMY      TUNNELED VENOUS PORT PLACEMENT Left 11/17/14    chemo port insertion; left chest    TURP  03/23/2018    TURP  05/01/2019    cystoscopy    TURP N/A 5/1/2019    CYSTOSCOPY, TUR-PROSTATE GREENLIGHT XPS LASER performed by Qasim Espinoza MD at [address] [facility]      umbilical repair     Family History   Problem Relation Age of Onset    Heart Failure Father     Arthritis Father     No Known Problems Maternal Grandmother     No Known Problems Maternal Grandfather     No Known Problems Paternal Grandmother     No Known Problems Paternal Grandfather      Current Outpatient Medications   Medication Sig Dispense Refill    linaclotide (LINZESS) 145 MCG capsule Take 1 capsule by mouth every morning (before breakfast) 30 capsule 1    docusate sodium (COLACE, DULCOLAX) 100 MG CAPS Take 200 mg by mouth 2 times daily 120 capsule 0    Multiple Vitamins-Minerals (CENTRUM SILVER PO) Take 1 tablet by mouth daily      clotrimazole-betamethasone (LOTRISONE) 1-0.05 % cream Apply topically to head of penis (retract foreskin) once a day. 1 Tube 0    Ascorbic Acid (VITAMIN C) 500 MG CHEW Take 500 mg by mouth daily      simvastatin (ZOCOR) 20 MG tablet TAKE ONE TABLET BY MOUTH ONCE DAILY 90 tablet 3    tamsulosin (FLOMAX) 0.4 MG capsule TAKE 1 CAPSULE BY MOUTH ONCE DAILY WITH  SUPPER 90 capsule 3    methotrexate (RHEUMATREX) 2.5 MG chemo tablet TK 6 TS PO ONCE A WEEK  2    folic acid (FOLVITE) 1 MG tablet Take 1 mg by mouth Daily with supper      lisinopril (PRINIVIL;ZESTRIL) 5 MG tablet Take 5 mg by mouth Daily with supper       Lancets MISC 1 each by Does not apply route daily 100 each 3    glucose monitoring kit (FREESTYLE) monitoring kit 1 kit by Does not apply route daily as needed 1 kit 0    Insulin Pen Needle (PEN NEEDLES 31GX5/16\") 31G X 8 MM MISC 1 each by Does not apply route 2 times daily.  glimepiride (AMARYL) 2 MG tablet Take 1 tablet by mouth every morning (before breakfast) 90 tablet 1     No current facility-administered medications for this visit. ALLERGIES:  No Known Allergies    Social History     Tobacco Use    Smoking status: Former Smoker     Packs/day: 2.00     Types: Cigarettes     Start date:      Last attempt to quit: 1991     Years since quittin.6    Smokeless tobacco: Never Used   Substance Use Topics    Alcohol use: No     Alcohol/week: 0.0 oz      Body mass index is 20.34 kg/m².   /60   Ht 6' 3.98\" (1.93 m)   Wt 167 lb (75.8 kg)   BMI 20.34 kg/m²     Lab Results   Component Value Date     2019    K 4.8 2019    CL 98 2019    CO2 26 2019    BUN 31 2019    CREATININE 0.83 05/03/2019    GLUCOSE 306 05/03/2019    CALCIUM 8.8 05/03/2019    PROT 6.5 05/03/2019    LABALBU 4.1 05/03/2019    BILITOT 1.64 05/03/2019    ALKPHOS 67 05/03/2019    AST 23 05/03/2019    ALT 15 05/03/2019       Lab Results   Component Value Date    WBC 8.7 05/07/2019    RBC 3.54 05/07/2019    HGB 10.6 05/07/2019    HCT 33.1 05/07/2019    MCV 93.5 05/07/2019    MCH 29.9 05/07/2019    MCHC 32.0 05/07/2019    RDW 15.0 05/07/2019    PLT See Reflexed IPF Result 05/07/2019    MPV NOT REPORTED 05/07/2019       Lab Results   Component Value Date    LABA1C 7.0 05/05/2019       Lab Results   Component Value Date    HDL 37 11/26/2013    LDLCHOLESTEROL 68 11/26/2013       Assessment / Plan:      Diagnosis Orders   1. Type 2 diabetes mellitus with diabetic neuropathy, without long-term current use of insulin ([facility] [address]     2. Slow transit constipation     3. Diabetic polyneuropathy associated with diabetes mellitus due to underlying condition ([facility] [address]     4. Chronic combined systolic and diastolic congestive heart failure (HCC)       No follow-ups on file. No orders of the defined types were placed in this encounter. No orders of the defined types were placed in this encounter.      Walks with short steps  No fall  No ataxia  dizzines on standing for few seconds  Mri bennie nil acuate  Mri cervical spine  djd       a1xc noted 7    consitpation incrae linzess to 290  dizeinss dlikey mccracken bpv   No cereberllar cva  No NPH

## 2019-07-16 ENCOUNTER — HOSPITAL ENCOUNTER (OUTPATIENT)
Dept: NUCLEAR MEDICINE | Age: 84
Discharge: HOME | End: 2019-07-18
Payer: MEDICARE

## 2019-07-16 DIAGNOSIS — G20 PARKINSON DISEASE (HCC): ICD-10-CM

## 2019-07-16 PROCEDURE — 78607 NM BRAIN SPECT: CPT

## 2019-07-16 PROCEDURE — 3430000000 HC RX DIAGNOSTIC RADIOPHARMACEUTICAL: Performed by: PSYCHIATRY & NEUROLOGY

## 2019-07-16 PROCEDURE — A9584 IODINE I-123 IOFLUPANE: HCPCS | Performed by: PSYCHIATRY & NEUROLOGY

## 2019-07-16 RX ADMIN — IOFLUPANE I-123 5 MILLICURIE: 2 INJECTION, SOLUTION INTRAVENOUS at 09:30

## 2019-08-06 ENCOUNTER — TELEPHONE (OUTPATIENT)
Dept: INTERNAL MEDICINE CLINIC | Age: 84
End: 2019-08-06

## 2019-08-14 ENCOUNTER — OFFICE VISIT (OUTPATIENT)
Dept: INTERNAL MEDICINE CLINIC | Age: 84
End: 2019-08-14
Payer: MEDICARE

## 2019-08-14 VITALS
DIASTOLIC BLOOD PRESSURE: 75 MMHG | HEIGHT: 73 IN | BODY MASS INDEX: 21.74 KG/M2 | WEIGHT: 164 LBS | HEART RATE: 65 BPM | SYSTOLIC BLOOD PRESSURE: 133 MMHG

## 2019-08-14 DIAGNOSIS — Z23 ENCOUNTER FOR IMMUNIZATION: ICD-10-CM

## 2019-08-14 DIAGNOSIS — Z00.00 ROUTINE GENERAL MEDICAL EXAMINATION AT A HEALTH CARE FACILITY: Primary | ICD-10-CM

## 2019-08-14 PROCEDURE — 90670 PCV13 VACCINE IM: CPT | Performed by: NURSE PRACTITIONER

## 2019-08-14 PROCEDURE — 4040F PNEUMOC VAC/ADMIN/RCVD: CPT | Performed by: NURSE PRACTITIONER

## 2019-08-14 PROCEDURE — G0009 ADMIN PNEUMOCOCCAL VACCINE: HCPCS | Performed by: NURSE PRACTITIONER

## 2019-08-14 PROCEDURE — G0438 PPPS, INITIAL VISIT: HCPCS | Performed by: NURSE PRACTITIONER

## 2019-08-14 PROCEDURE — 1123F ACP DISCUSS/DSCN MKR DOCD: CPT | Performed by: NURSE PRACTITIONER

## 2019-08-14 RX ORDER — GLIMEPIRIDE 2 MG/1
2 TABLET ORAL
COMMUNITY
End: 2019-08-20

## 2019-08-14 ASSESSMENT — PATIENT HEALTH QUESTIONNAIRE - PHQ9
SUM OF ALL RESPONSES TO PHQ QUESTIONS 1-9: 2
SUM OF ALL RESPONSES TO PHQ QUESTIONS 1-9: 2

## 2019-08-14 ASSESSMENT — LIFESTYLE VARIABLES: HOW OFTEN DO YOU HAVE A DRINK CONTAINING ALCOHOL: 0

## 2019-08-14 NOTE — PROGRESS NOTES
Visit Information    Have you changed or started any medications since your last visit including any over-the-counter medicines, vitamins, or herbal medicines? no   Have you stopped taking any of your medications? Is so, why? -  no  Are you having any side effects from any of your medications? - no    Have you seen any other physician or provider since your last visit?  no   Have you had any other diagnostic tests since your last visit?  no   Have you been seen in the emergency room and/or had an admission in a hospital since we last saw you?  no   Have you had your routine dental cleaning in the past 6 months?  no     Do you have an active MyChart account? If no, what is the barrier? Yes    Patient Care Team:  Kelly Landon MD as PCP - General (Internal Medicine)  Kelly Landon MD as PCP - [facility]  Ana Salas MD as Consulting Physician (Hematology and Oncology)  Tha Guerin MD as Physician (Radiation Oncology)  Radha Hines MD as Consulting Physician (Otolaryngology)  Jose oMore MD as Surgeon (Cardiology)    Medical History Review  Past Medical, Family, and Social History reviewed and does contribute to the patient presenting condition    Health Maintenance   Topic Date Due    DTaP/Tdap/Td vaccine (1 - Tdap) 1951    Shingles Vaccine (1 of 2) 1982    Pneumococcal 65+ years Vaccine (1 of 2 - PCV13) 1997    Flu vaccine (1) 2019    Potassium monitoring  2020    Creatinine monitoring  2020    Annual Wellness Visit (AWV)  2020             Medicare Annual Wellness Visit  Name: Vadim Prajapati Date: 2019   MRN: [id number] Sex: Male   Age: 80 y.o. Ethnicity: Non-/Non    : 1932 Race: Hernandez Berry is here for Medicare AWV  Accompanied by his wife.      Screenings for behavioral, psychosocial and functional/safety risks, and cognitive dysfunction are all negative except as indicated below. These results, as well as other patient data from the [address] form, are documented in Flowsheets linked to this Encounter. No Known Allergies    Prior to Visit Medications    Medication Sig Taking?  Authorizing Provider   carbidopa-levodopa (SINEMET)  MG per tablet Take 1 tablet by mouth 3 times daily Yes Historical Provider, MD   glimepiride (AMARYL) 2 MG tablet Take 2 mg by mouth every morning (before breakfast) Yes Historical Provider, MD   docusate sodium (COLACE, DULCOLAX) 100 MG CAPS Take 200 mg by mouth 2 times daily Yes Davdi Jules MD   Multiple Vitamins-Minerals (CENTRUM SILVER PO) Take 1 tablet by mouth daily Yes Historical Provider, MD   simvastatin (ZOCOR) 20 MG tablet TAKE ONE TABLET BY MOUTH ONCE DAILY Yes Chaparro Flanagan MD   methotrexate (RHEUMATREX) 2.5 MG chemo tablet TK 6 TS PO ONCE A WEEK Yes Historical Provider, MD   folic acid (FOLVITE) 1 MG tablet Take 1 mg by mouth Daily with supper Yes Historical Provider, MD   lisinopril (PRINIVIL;ZESTRIL) 5 MG tablet Take 5 mg by mouth Daily with supper  Yes Historical Provider, MD   glimepiride (AMARYL) 2 MG tablet Take 1 tablet by mouth every morning (before breakfast)  Chaparro Flanagan MD       Past Medical History:   Diagnosis Date    Arthritis     Diabetic neuropathy ([address]     First degree AV block     Full dentures     Upper & Lower    H/O dizziness     being worked up by [place] [facility]    Hematuria 03/2018    Hyperlipidemia     Hypertension     Malignant neoplasm of urinary bladder ([address] 03/2018    RBBB     Dr. 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 Right 1949 (AWV)  08/13/2020     Recommendations for Preventive Services Due: see orders and patient instructions/AVS.  . Recommended screening schedule for the next 5-10 years is provided to the patient in written form: see Patient Instructions/AVS.    Kemar Gupta was seen today for medicare awv.     Diagnoses and all orders for this visit:    Routine general medical examination at a health care facility  -     Cancel: Pneumococcal polysaccharide vaccine 23-valent greater than or equal to 3yo subcutaneous/IM    Encounter for immunization   -     Cancel: Pneumococcal polysaccharide vaccine 23-valent greater than or equal to 3yo subcutaneous/IM    Other orders  -     PREVNAR 13 IM (Pneumococcal conjugate vaccine 13-valent)          Refused shingles vaccine

## 2019-08-20 ENCOUNTER — HOSPITAL ENCOUNTER (INPATIENT)
Age: 84
LOS: 2 days | Discharge: HOME HEALTH | DRG: 640 | End: 2019-08-23
Attending: EMERGENCY MEDICINE | Admitting: INTERNAL MEDICINE
Payer: MEDICARE

## 2019-08-20 ENCOUNTER — APPOINTMENT (OUTPATIENT)
Dept: GENERAL RADIOLOGY | Age: 84
DRG: 640 | End: 2019-08-20
Payer: MEDICARE

## 2019-08-20 DIAGNOSIS — R55 NEAR SYNCOPE: Primary | ICD-10-CM

## 2019-08-20 PROBLEM — N30.01 ACUTE CYSTITIS WITH HEMATURIA: Status: ACTIVE | Noted: 2019-08-20

## 2019-08-20 LAB
-: ABNORMAL
ABSOLUTE BANDS #: 0.14 K/UL (ref 0–1)
ABSOLUTE EOS #: 0 K/UL (ref 0–0.4)
ABSOLUTE IMMATURE GRANULOCYTE: ABNORMAL K/UL (ref 0–0.3)
ABSOLUTE LYMPH #: 0.38 K/UL (ref 1–4.8)
ABSOLUTE MONO #: 0.24 K/UL (ref 0.1–1.3)
ALBUMIN SERPL-MCNC: 3.3 G/DL (ref 3.5–5.2)
ALBUMIN/GLOBULIN RATIO: ABNORMAL (ref 1–2.5)
ALP BLD-CCNC: 64 U/L (ref 40–129)
ALT SERPL-CCNC: <5 U/L (ref 5–41)
AMORPHOUS: ABNORMAL
ANION GAP SERPL CALCULATED.3IONS-SCNC: 10 MMOL/L (ref 9–17)
AST SERPL-CCNC: 18 U/L
BACTERIA: ABNORMAL
BANDS: 3 % (ref 0–10)
BASOPHILS # BLD: 1 % (ref 0–2)
BASOPHILS ABSOLUTE: 0.05 K/UL (ref 0–0.2)
BILIRUB SERPL-MCNC: 1.33 MG/DL (ref 0.3–1.2)
BILIRUBIN URINE: NEGATIVE
BNP INTERPRETATION: ABNORMAL
BUN BLDV-MCNC: 32 MG/DL (ref 8–23)
BUN/CREAT BLD: ABNORMAL (ref 9–20)
CALCIUM SERPL-MCNC: 8.5 MG/DL (ref 8.6–10.4)
CASTS UA: ABNORMAL /LPF
CHLORIDE BLD-SCNC: 99 MMOL/L (ref 98–107)
CO2: 23 MMOL/L (ref 20–31)
COLOR: YELLOW
COMMENT UA: ABNORMAL
CREAT SERPL-MCNC: 1.01 MG/DL (ref 0.7–1.2)
CRYSTALS, UA: ABNORMAL /HPF
DIFFERENTIAL TYPE: ABNORMAL
EOSINOPHILS RELATIVE PERCENT: 0 % (ref 0–4)
EPITHELIAL CELLS UA: ABNORMAL /HPF
GFR AFRICAN AMERICAN: >60 ML/MIN
GFR NON-AFRICAN AMERICAN: >60 ML/MIN
GFR SERPL CREATININE-BSD FRML MDRD: ABNORMAL ML/MIN/{1.73_M2}
GFR SERPL CREATININE-BSD FRML MDRD: ABNORMAL ML/MIN/{1.73_M2}
GLUCOSE BLD-MCNC: 160 MG/DL (ref 70–99)
GLUCOSE URINE: NEGATIVE
HCT VFR BLD CALC: 32.7 % (ref 41–53)
HEMOGLOBIN: 10.6 G/DL (ref 13.5–17.5)
IMMATURE GRANULOCYTES: ABNORMAL %
KETONES, URINE: NEGATIVE
LEUKOCYTE ESTERASE, URINE: ABNORMAL
LYMPHOCYTES # BLD: 8 % (ref 24–44)
MCH RBC QN AUTO: 28.9 PG (ref 26–34)
MCHC RBC AUTO-ENTMCNC: 32.4 G/DL (ref 31–37)
MCV RBC AUTO: 89.3 FL (ref 80–100)
MONOCYTES # BLD: 5 % (ref 1–7)
MORPHOLOGY: ABNORMAL
MUCUS: ABNORMAL
NITRITE, URINE: POSITIVE
NRBC AUTOMATED: ABNORMAL PER 100 WBC
OTHER OBSERVATIONS UA: ABNORMAL
PDW BLD-RTO: 15.6 % (ref 11.5–14.9)
PH UA: 7 (ref 5–8)
PLATELET # BLD: 226 K/UL (ref 150–450)
PLATELET ESTIMATE: ABNORMAL
PMV BLD AUTO: 10.6 FL (ref 6–12)
POTASSIUM SERPL-SCNC: 4 MMOL/L (ref 3.7–5.3)
PRO-BNP: 1801 PG/ML
PROTEIN UA: ABNORMAL
RBC # BLD: 3.66 M/UL (ref 4.5–5.9)
RBC # BLD: ABNORMAL 10*6/UL
RBC UA: ABNORMAL /HPF
RENAL EPITHELIAL, UA: ABNORMAL /HPF
SEG NEUTROPHILS: 83 % (ref 36–66)
SEGMENTED NEUTROPHILS ABSOLUTE COUNT: 3.89 K/UL (ref 1.3–9.1)
SODIUM BLD-SCNC: 132 MMOL/L (ref 135–144)
SPECIFIC GRAVITY UA: 1.02 (ref 1–1.03)
TOTAL PROTEIN: 5.8 G/DL (ref 6.4–8.3)
TRICHOMONAS: ABNORMAL
TROPONIN INTERP: NORMAL
TROPONIN INTERP: NORMAL
TROPONIN T: NORMAL NG/ML
TROPONIN T: NORMAL NG/ML
TROPONIN, HIGH SENSITIVITY: 21 NG/L (ref 0–22)
TROPONIN, HIGH SENSITIVITY: 22 NG/L (ref 0–22)
TURBIDITY: ABNORMAL
URINE HGB: ABNORMAL
UROBILINOGEN, URINE: NORMAL
WBC # BLD: 4.7 K/UL (ref 3.5–11)
WBC # BLD: ABNORMAL 10*3/UL
WBC UA: ABNORMAL /HPF
YEAST: ABNORMAL

## 2019-08-20 PROCEDURE — 96365 THER/PROPH/DIAG IV INF INIT: CPT

## 2019-08-20 PROCEDURE — 36415 COLL VENOUS BLD VENIPUNCTURE: CPT

## 2019-08-20 PROCEDURE — 2580000003 HC RX 258: Performed by: NURSE PRACTITIONER

## 2019-08-20 PROCEDURE — 6370000000 HC RX 637 (ALT 250 FOR IP): Performed by: NURSE PRACTITIONER

## 2019-08-20 PROCEDURE — 87086 URINE CULTURE/COLONY COUNT: CPT

## 2019-08-20 PROCEDURE — 2580000003 HC RX 258: Performed by: STUDENT IN AN ORGANIZED HEALTH CARE EDUCATION/TRAINING PROGRAM

## 2019-08-20 PROCEDURE — 87186 SC STD MICRODIL/AGAR DIL: CPT

## 2019-08-20 PROCEDURE — 71046 X-RAY EXAM CHEST 2 VIEWS: CPT

## 2019-08-20 PROCEDURE — G0378 HOSPITAL OBSERVATION PER HR: HCPCS

## 2019-08-20 PROCEDURE — 83880 ASSAY OF NATRIURETIC PEPTIDE: CPT

## 2019-08-20 PROCEDURE — 80053 COMPREHEN METABOLIC PANEL: CPT

## 2019-08-20 PROCEDURE — 81001 URINALYSIS AUTO W/SCOPE: CPT

## 2019-08-20 PROCEDURE — 96361 HYDRATE IV INFUSION ADD-ON: CPT

## 2019-08-20 PROCEDURE — 85025 COMPLETE CBC W/AUTO DIFF WBC: CPT

## 2019-08-20 PROCEDURE — 86403 PARTICLE AGGLUT ANTBDY SCRN: CPT

## 2019-08-20 PROCEDURE — 84484 ASSAY OF TROPONIN QUANT: CPT

## 2019-08-20 PROCEDURE — 93005 ELECTROCARDIOGRAM TRACING: CPT | Performed by: STUDENT IN AN ORGANIZED HEALTH CARE EDUCATION/TRAINING PROGRAM

## 2019-08-20 PROCEDURE — 6360000002 HC RX W HCPCS: Performed by: NURSE PRACTITIONER

## 2019-08-20 PROCEDURE — 99285 EMERGENCY DEPT VISIT HI MDM: CPT

## 2019-08-20 RX ORDER — ACETAMINOPHEN 325 MG/1
650 TABLET ORAL EVERY 4 HOURS PRN
Status: DISCONTINUED | OUTPATIENT
Start: 2019-08-20 | End: 2019-08-23 | Stop reason: HOSPADM

## 2019-08-20 RX ORDER — 0.9 % SODIUM CHLORIDE 0.9 %
1000 INTRAVENOUS SOLUTION INTRAVENOUS ONCE
Status: COMPLETED | OUTPATIENT
Start: 2019-08-20 | End: 2019-08-20

## 2019-08-20 RX ORDER — DEXTROSE MONOHYDRATE 50 MG/ML
100 INJECTION, SOLUTION INTRAVENOUS PRN
Status: DISCONTINUED | OUTPATIENT
Start: 2019-08-20 | End: 2019-08-23 | Stop reason: HOSPADM

## 2019-08-20 RX ORDER — GLIMEPIRIDE 2 MG/1
2 TABLET ORAL
Status: DISCONTINUED | OUTPATIENT
Start: 2019-08-21 | End: 2019-08-23 | Stop reason: HOSPADM

## 2019-08-20 RX ORDER — SIMVASTATIN 20 MG
20 TABLET ORAL NIGHTLY
Status: DISCONTINUED | OUTPATIENT
Start: 2019-08-20 | End: 2019-08-23 | Stop reason: HOSPADM

## 2019-08-20 RX ORDER — POLYETHYLENE GLYCOL 3350 17 G/17G
17 POWDER, FOR SOLUTION ORAL DAILY
Status: DISCONTINUED | OUTPATIENT
Start: 2019-08-21 | End: 2019-08-23 | Stop reason: HOSPADM

## 2019-08-20 RX ORDER — ASPIRIN 81 MG/1
81 TABLET ORAL DAILY
Status: ON HOLD | COMMUNITY
End: 2019-10-10 | Stop reason: SDUPTHER

## 2019-08-20 RX ORDER — SODIUM CHLORIDE 0.9 % (FLUSH) 0.9 %
10 SYRINGE (ML) INJECTION PRN
Status: DISCONTINUED | OUTPATIENT
Start: 2019-08-20 | End: 2019-08-23 | Stop reason: HOSPADM

## 2019-08-20 RX ORDER — NICOTINE POLACRILEX 4 MG
15 LOZENGE BUCCAL PRN
Status: DISCONTINUED | OUTPATIENT
Start: 2019-08-20 | End: 2019-08-23 | Stop reason: HOSPADM

## 2019-08-20 RX ORDER — POTASSIUM CHLORIDE 7.45 MG/ML
10 INJECTION INTRAVENOUS PRN
Status: DISCONTINUED | OUTPATIENT
Start: 2019-08-20 | End: 2019-08-23 | Stop reason: HOSPADM

## 2019-08-20 RX ORDER — POTASSIUM CHLORIDE 20 MEQ/1
40 TABLET, EXTENDED RELEASE ORAL PRN
Status: DISCONTINUED | OUTPATIENT
Start: 2019-08-20 | End: 2019-08-23 | Stop reason: HOSPADM

## 2019-08-20 RX ORDER — FOLIC ACID 1 MG/1
1 TABLET ORAL
Status: DISCONTINUED | OUTPATIENT
Start: 2019-08-20 | End: 2019-08-23 | Stop reason: HOSPADM

## 2019-08-20 RX ORDER — M-VIT,TX,IRON,MINS/CALC/FOLIC 27MG-0.4MG
TABLET ORAL DAILY
Status: DISCONTINUED | OUTPATIENT
Start: 2019-08-21 | End: 2019-08-23 | Stop reason: HOSPADM

## 2019-08-20 RX ORDER — LISINOPRIL 5 MG/1
5 TABLET ORAL
Status: DISCONTINUED | OUTPATIENT
Start: 2019-08-20 | End: 2019-08-23 | Stop reason: HOSPADM

## 2019-08-20 RX ORDER — SIMVASTATIN 20 MG
20 TABLET ORAL NIGHTLY
COMMUNITY

## 2019-08-20 RX ORDER — SODIUM CHLORIDE 0.9 % (FLUSH) 0.9 %
10 SYRINGE (ML) INJECTION EVERY 12 HOURS SCHEDULED
Status: DISCONTINUED | OUTPATIENT
Start: 2019-08-20 | End: 2019-08-23 | Stop reason: HOSPADM

## 2019-08-20 RX ORDER — BISACODYL 10 MG
10 SUPPOSITORY, RECTAL RECTAL DAILY PRN
Status: DISCONTINUED | OUTPATIENT
Start: 2019-08-20 | End: 2019-08-23 | Stop reason: HOSPADM

## 2019-08-20 RX ORDER — ONDANSETRON 2 MG/ML
4 INJECTION INTRAMUSCULAR; INTRAVENOUS EVERY 6 HOURS PRN
Status: DISCONTINUED | OUTPATIENT
Start: 2019-08-20 | End: 2019-08-23 | Stop reason: HOSPADM

## 2019-08-20 RX ORDER — SODIUM CHLORIDE 9 MG/ML
INJECTION, SOLUTION INTRAVENOUS CONTINUOUS
Status: DISCONTINUED | OUTPATIENT
Start: 2019-08-20 | End: 2019-08-23

## 2019-08-20 RX ORDER — ASPIRIN 81 MG/1
81 TABLET ORAL DAILY
Status: DISCONTINUED | OUTPATIENT
Start: 2019-08-21 | End: 2019-08-23 | Stop reason: HOSPADM

## 2019-08-20 RX ORDER — MULTIVIT WITH MINERALS/LUTEIN
1 TABLET ORAL DAILY
Status: DISCONTINUED | OUTPATIENT
Start: 2019-08-21 | End: 2019-08-20 | Stop reason: RX

## 2019-08-20 RX ORDER — MAGNESIUM SULFATE 1 G/100ML
1 INJECTION INTRAVENOUS PRN
Status: DISCONTINUED | OUTPATIENT
Start: 2019-08-20 | End: 2019-08-23 | Stop reason: HOSPADM

## 2019-08-20 RX ORDER — DEXTROSE MONOHYDRATE 25 G/50ML
12.5 INJECTION, SOLUTION INTRAVENOUS PRN
Status: DISCONTINUED | OUTPATIENT
Start: 2019-08-20 | End: 2019-08-23 | Stop reason: HOSPADM

## 2019-08-20 RX ADMIN — CEFTRIAXONE SODIUM 1 G: 1 INJECTION, POWDER, FOR SOLUTION INTRAMUSCULAR; INTRAVENOUS at 21:16

## 2019-08-20 RX ADMIN — SODIUM CHLORIDE: 9 INJECTION, SOLUTION INTRAVENOUS at 23:05

## 2019-08-20 RX ADMIN — SIMVASTATIN 20 MG: 20 TABLET, FILM COATED ORAL at 23:05

## 2019-08-20 RX ADMIN — SODIUM CHLORIDE 1000 ML: 9 INJECTION, SOLUTION INTRAVENOUS at 18:50

## 2019-08-20 RX ADMIN — Medication 10 ML: at 23:05

## 2019-08-20 ASSESSMENT — ENCOUNTER SYMPTOMS
EYE PAIN: 0
SORE THROAT: 0
COLOR CHANGE: 0
ABDOMINAL DISTENTION: 0
SHORTNESS OF BREATH: 0
BACK PAIN: 0
TROUBLE SWALLOWING: 0
RHINORRHEA: 0
NAUSEA: 0
WHEEZING: 0
CONSTIPATION: 1
CHOKING: 0
ABDOMINAL PAIN: 0
COUGH: 0
VOMITING: 0

## 2019-08-20 NOTE — ED PROVIDER NOTES
colon, diagnostic; Cystocopy (2019); Cystoscopy (N/A, 2019); TURP (2019); and TURP (N/A, 2019).     Social History     Socioeconomic History    Marital status:      Spouse name: Corona Martinez Number of children: 3    Years of education: Not on file    Highest education level: Not on file   Occupational History     Employer: [employer]    Financial resource strain: Not on file    Food insecurity:     Worry: Not on file     Inability: Not on file    Transportation needs:     Medical: Not on file     Non-medical: Not on file   Tobacco Use    Smoking status: Former Smoker     Packs/day: 2.00     Types: Cigarettes     Start date:      Last attempt to quit: 1991     Years since quittin.7    Smokeless tobacco: Never Used   Substance and Sexual Activity    Alcohol use: No     Alcohol/week: 0.0 standard drinks    Drug use: No    Sexual activity: Yes     Partners: Female   Lifestyle    Physical activity:     Days per week: Not on file     Minutes per session: Not on file    Stress: Not on file   Relationships    Social connections:     Talks on phone: Not on file     Gets together: Not on file     Attends Orthodoxy service: Not on file     Active member of club or organization: Not on file     Attends meetings of clubs or organizations: Not on file     Relationship status: Not on file    Intimate partner violence:     Fear of current or ex partner: Not on file     Emotionally abused: Not on file     Physically abused: Not on file     Forced sexual activity: Not on file   Other Topics Concern    Not on file   Social History Narrative    Not on file       Family History   Problem Relation Age of Onset    Heart Failure Father     Arthritis Father     No Known Problems Maternal Grandmother     No Known Problems Maternal Grandfather     No Known Problems Paternal Grandmother     No Known Problems Paternal Grandfather        Allergies:

## 2019-08-21 ENCOUNTER — TELEPHONE (OUTPATIENT)
Dept: UROLOGY | Age: 84
End: 2019-08-21

## 2019-08-21 LAB
ANION GAP SERPL CALCULATED.3IONS-SCNC: 9 MMOL/L (ref 9–17)
BUN BLDV-MCNC: 26 MG/DL (ref 8–23)
BUN/CREAT BLD: ABNORMAL (ref 9–20)
CALCIUM SERPL-MCNC: 8.3 MG/DL (ref 8.6–10.4)
CHLORIDE BLD-SCNC: 101 MMOL/L (ref 98–107)
CO2: 24 MMOL/L (ref 20–31)
CREAT SERPL-MCNC: 0.9 MG/DL (ref 0.7–1.2)
ESTIMATED AVERAGE GLUCOSE: 166 MG/DL
GFR AFRICAN AMERICAN: >60 ML/MIN
GFR NON-AFRICAN AMERICAN: >60 ML/MIN
GFR SERPL CREATININE-BSD FRML MDRD: ABNORMAL ML/MIN/{1.73_M2}
GFR SERPL CREATININE-BSD FRML MDRD: ABNORMAL ML/MIN/{1.73_M2}
GLUCOSE BLD-MCNC: 120 MG/DL (ref 75–110)
GLUCOSE BLD-MCNC: 124 MG/DL (ref 70–99)
GLUCOSE BLD-MCNC: 124 MG/DL (ref 75–110)
GLUCOSE BLD-MCNC: 133 MG/DL (ref 75–110)
GLUCOSE BLD-MCNC: 178 MG/DL (ref 75–110)
HBA1C MFR BLD: 7.4 % (ref 4–6)
HCT VFR BLD CALC: 32.4 % (ref 41–53)
HEMOGLOBIN: 10.6 G/DL (ref 13.5–17.5)
LV EF: 55 %
LVEF MODALITY: NORMAL
MAGNESIUM: 1.9 MG/DL (ref 1.6–2.6)
MCH RBC QN AUTO: 29 PG (ref 26–34)
MCHC RBC AUTO-ENTMCNC: 32.7 G/DL (ref 31–37)
MCV RBC AUTO: 88.9 FL (ref 80–100)
NRBC AUTOMATED: ABNORMAL PER 100 WBC
PDW BLD-RTO: 15.8 % (ref 11.5–14.9)
PHOSPHORUS: 2.5 MG/DL (ref 2.5–4.5)
PLATELET # BLD: 210 K/UL (ref 150–450)
PMV BLD AUTO: 9.7 FL (ref 6–12)
POTASSIUM SERPL-SCNC: 3.8 MMOL/L (ref 3.7–5.3)
RBC # BLD: 3.65 M/UL (ref 4.5–5.9)
SODIUM BLD-SCNC: 134 MMOL/L (ref 135–144)
THYROXINE, FREE: 0.84 NG/DL (ref 0.93–1.7)
TROPONIN INTERP: NORMAL
TROPONIN T: NORMAL NG/ML
TROPONIN, HIGH SENSITIVITY: 20 NG/L (ref 0–22)
TSH SERPL DL<=0.05 MIU/L-ACNC: 6.24 MIU/L (ref 0.3–5)
WBC # BLD: 5 K/UL (ref 3.5–11)

## 2019-08-21 PROCEDURE — 97116 GAIT TRAINING THERAPY: CPT

## 2019-08-21 PROCEDURE — 82947 ASSAY GLUCOSE BLOOD QUANT: CPT

## 2019-08-21 PROCEDURE — 97162 PT EVAL MOD COMPLEX 30 MIN: CPT

## 2019-08-21 PROCEDURE — 2060000000 HC ICU INTERMEDIATE R&B

## 2019-08-21 PROCEDURE — 80048 BASIC METABOLIC PNL TOTAL CA: CPT

## 2019-08-21 PROCEDURE — 96372 THER/PROPH/DIAG INJ SC/IM: CPT

## 2019-08-21 PROCEDURE — 6360000002 HC RX W HCPCS: Performed by: NURSE PRACTITIONER

## 2019-08-21 PROCEDURE — 99223 1ST HOSP IP/OBS HIGH 75: CPT | Performed by: INTERNAL MEDICINE

## 2019-08-21 PROCEDURE — 96361 HYDRATE IV INFUSION ADD-ON: CPT

## 2019-08-21 PROCEDURE — 84484 ASSAY OF TROPONIN QUANT: CPT

## 2019-08-21 PROCEDURE — 83036 HEMOGLOBIN GLYCOSYLATED A1C: CPT

## 2019-08-21 PROCEDURE — 36415 COLL VENOUS BLD VENIPUNCTURE: CPT

## 2019-08-21 PROCEDURE — 84100 ASSAY OF PHOSPHORUS: CPT

## 2019-08-21 PROCEDURE — 84443 ASSAY THYROID STIM HORMONE: CPT

## 2019-08-21 PROCEDURE — 93306 TTE W/DOPPLER COMPLETE: CPT

## 2019-08-21 PROCEDURE — 2580000003 HC RX 258: Performed by: NURSE PRACTITIONER

## 2019-08-21 PROCEDURE — 83735 ASSAY OF MAGNESIUM: CPT

## 2019-08-21 PROCEDURE — 84439 ASSAY OF FREE THYROXINE: CPT

## 2019-08-21 PROCEDURE — 85027 COMPLETE CBC AUTOMATED: CPT

## 2019-08-21 PROCEDURE — 6370000000 HC RX 637 (ALT 250 FOR IP): Performed by: NURSE PRACTITIONER

## 2019-08-21 RX ADMIN — SIMVASTATIN 20 MG: 20 TABLET, FILM COATED ORAL at 21:37

## 2019-08-21 RX ADMIN — ASPIRIN 81 MG: 81 TABLET ORAL at 08:36

## 2019-08-21 RX ADMIN — LISINOPRIL 5 MG: 5 TABLET ORAL at 17:45

## 2019-08-21 RX ADMIN — SODIUM CHLORIDE: 9 INJECTION, SOLUTION INTRAVENOUS at 19:32

## 2019-08-21 RX ADMIN — POLYETHYLENE GLYCOL 3350 17 G: 17 POWDER, FOR SOLUTION ORAL at 08:36

## 2019-08-21 RX ADMIN — FOLIC ACID 1 MG: 1 TABLET ORAL at 17:45

## 2019-08-21 RX ADMIN — CARBIDOPA AND LEVODOPA 1 TABLET: 25; 100 TABLET ORAL at 21:23

## 2019-08-21 RX ADMIN — ENOXAPARIN SODIUM 40 MG: 40 INJECTION SUBCUTANEOUS at 08:36

## 2019-08-21 RX ADMIN — INSULIN LISPRO 1 UNITS: 100 INJECTION, SOLUTION INTRAVENOUS; SUBCUTANEOUS at 21:34

## 2019-08-21 RX ADMIN — MULTIPLE VITAMINS W/ MINERALS TAB 1 TABLET: TAB at 08:36

## 2019-08-21 RX ADMIN — GLIMEPIRIDE 2 MG: 2 TABLET ORAL at 05:11

## 2019-08-21 RX ADMIN — CEFTRIAXONE SODIUM 1 G: 1 INJECTION, POWDER, FOR SOLUTION INTRAMUSCULAR; INTRAVENOUS at 21:23

## 2019-08-21 RX ADMIN — SODIUM CHLORIDE: 9 INJECTION, SOLUTION INTRAVENOUS at 08:43

## 2019-08-21 RX ADMIN — MAGNESIUM HYDROXIDE 30 ML: 400 SUSPENSION ORAL at 05:13

## 2019-08-21 ASSESSMENT — ENCOUNTER SYMPTOMS
BACK PAIN: 1
DIARRHEA: 0
CONSTIPATION: 1
VOMITING: 0
SHORTNESS OF BREATH: 0
WHEEZING: 0
NAUSEA: 0
SORE THROAT: 0
COUGH: 0
ABDOMINAL PAIN: 0

## 2019-08-21 ASSESSMENT — PAIN SCALES - GENERAL: PAINLEVEL_OUTOF10: 0

## 2019-08-21 NOTE — PROGRESS NOTES
Medication History completed:    New medications: aspirin    Medications discontinued: none     Changes to dosing:   Simvastatin changed to nightly  Methotrexate changed to 15 mg (6 tablets) weekly on Saturdays    Stated allergies: NKDA    Other pertinent information: Medications confirmed with [address]      Thank you,  Pati Rosenthal, PharmD, BCPS  [phone]

## 2019-08-21 NOTE — H&P
allergies. REVIEW OF SYSTEMS     Review of Systems   Constitutional: Positive for unexpected weight change. Negative for chills, diaphoresis and fever. HENT: Negative for congestion and sore throat. Respiratory: Negative for cough, shortness of breath and wheezing. Cardiovascular: Negative for chest pain, palpitations and leg swelling. Gastrointestinal: Positive for constipation. Negative for abdominal pain, diarrhea, nausea and vomiting. Genitourinary: Positive for dysuria, frequency and urgency. Musculoskeletal: Positive for back pain. Negative for myalgias. Skin: Negative for rash. Neurological: Positive for weakness (generalized) and light-headedness. Negative for dizziness and headaches. Psychiatric/Behavioral: The patient is not nervous/anxious. PHYSICAL EXAM      /60   Pulse 62   Temp 98.8 °F (37.1 °C) (Oral)   Resp 18   Ht 6' 1\" (1.854 m)   Wt 171 lb 11.8 oz (77.9 kg)   SpO2 96%   BMI 22.66 kg/m²  Body mass index is 22.66 kg/m². Physical Exam   Constitutional: He is oriented to person, place, and time. He appears well-developed and well-nourished. No distress. HENT:   Head: Normocephalic and atraumatic. Tongue and oral mucosa extremely dry   Eyes: Pupils are equal, round, and reactive to light. Conjunctivae and EOM are normal.   Neck: Normal range of motion. Neck supple. No tracheal deviation present. Cardiovascular: Normal rate, regular rhythm, normal heart sounds and intact distal pulses. Exam reveals no gallop and no friction rub. No murmur heard. Pulmonary/Chest: Effort normal and breath sounds normal. No respiratory distress. He has no wheezes. He has no rales. He exhibits no tenderness. Abdominal: Soft. Bowel sounds are normal. He exhibits no distension. There is no tenderness. There is no guarding. Musculoskeletal: Normal range of motion. He exhibits no edema or tenderness. Lymphadenopathy:     He has no cervical adenopathy.    Neurological: He is alert and oriented to person, place, and time. He displays no seizure activity. Coordination normal.   Skin: Skin is warm and dry. No rash noted. He is not diaphoretic. No erythema. No pallor. Psychiatric: He has a normal mood and affect. His behavior is normal. Thought content normal.     DIAGNOSTICS      EKG:   EKG 12 Lead [[id number]] Collected: 08/20/19 1905   Updated: 08/20/19 1908     Ventricular Rate 74 BPM    Atrial Rate 74 BPM    P-R Interval 230 ms    QRS Duration 154 ms    Q-T Interval 402 ms    QTc Calculation (Bazett) 446 ms    P Axis -9 degrees    R Axis 18 degrees    T Axis 16 degrees   Narrative:     Sinus rhythm with 1st degree A-V block  Right bundle branch block  Abnormal ECG  When compared with ECG of 28-JUL-2015 09:26,  Inverted T waves have replaced nonspecific T wave abnormality in Anterior leads     Labs:  CBC:   Recent Labs     08/20/19 1848 08/21/19 0523   WBC 4.7 5.0   HGB 10.6* 10.6*    210     BMP:    Recent Labs     08/20/19 1848 08/21/19 0523   * 134*   K 4.0 3.8   CL 99 101   CO2 23 24   BUN 32* 26*   CREATININE 1.01 0.90   GLUCOSE 160* 124*     S. Calcium:  Recent Labs     08/21/19 0523   CALCIUM 8.3*     S. Ionized Calcium:No results for input(s): IONCA in the last 72 hours. S. Magnesium:  Recent Labs     08/21/19 0523   MG 1.9     S. Phosphorus:No results for input(s): PHOS in the last 72 hours. S. Glucose:No results for input(s): POCGLU in the last 72 hours. Glycosylated hemoglobin A1C:   Lab Results   Component Value Date    LABA1C 7.0 05/05/2019     Hepatic:   Recent Labs     08/20/19 1848   AST 18   ALT <5*   ALKPHOS 64     CARDIAC ENZY:   Recent Labs     08/20/19 1848 08/20/19 2010   TROPHS 22 21     INR: No results for input(s): INR in the last 72 hours. BNP:   Recent Labs     08/20/19 1848   PROBNP 1,801*      ABGs: No results for input(s): PH, PCO2, PO2, HCO3, O2SAT in the last 72 hours.   Lipids: No results for input(s): CHOL, TRIG, HDL,

## 2019-08-21 NOTE — PLAN OF CARE
Patient had no falls during shift. High fall risk, one assist. Patient has walker in room and bed alarm placed. Patient has slightly low Bp. Lisinopril held. On continuous fluids. Patient has UTI. On rocephin.     Problem: Falls - Risk of:  Goal: Will remain free from falls  Description  Will remain free from falls  Outcome: Met This Shift     Problem: HEMODYNAMIC STATUS  Goal: Patient has stable vital signs and fluid balance  Outcome: Ongoing     Problem: Physical Regulation:  Goal: Will remain free from infection  Description  Will remain free from infection  Outcome: Ongoing

## 2019-08-21 NOTE — CARE COORDINATION
[address] Transitions Interview     2019    Patient: Justin Hurd Patient : 1932   MRN: [id number]  Reason for Admission: pre syncope  RARS: Readmission Risk Score: 22         Spoke with: patient  Explained role of CTN, provided contact information, will continue to follow//JU      Readmission Risk  Patient Active Problem List   Diagnosis    Type 2 diabetes mellitus with neurologic complication ([address]    Diabetic polyneuropathy ([address]    Disorder of arteries and arterioles ([address]    Memory loss    Tongue cancer ([address]    Anorexia    Weight loss    Anxiety    Rash    Oral mucositis    Bradycardia    Chronic combined systolic and diastolic congestive heart failure (HCC)    History of tobacco abuse    Rheumatoid arteritis    Slow transit constipation    Orthostatic hypotension    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)    Right inguinal hernia    Bladder cancer (HCC)    Hematuria due to cystitis    Severe malnutrition (HCC)    Pre-syncope    Acute cystitis with hematuria       Inpatient Assessment  Care Transitions Summary    Care Transitions Inpatient Review  Medication Review  Do you have all of your prescriptions and are they filled?:  Yes   Housing Review  Social Support  Durable Medical Equipment  Functional Review  Hearing and Vision  Care Transitions Interventions         Follow Up  Future Appointments   Date Time Provider Department Center   2019 10:45 AM Lizeth Kline MD [address] [place] [address] Rd [facility]   2019 11:00 AM Chaparro Flanagan MD [address] [place] [address] Rd [facility]   10/28/2019 11:40 AM Pat Marshall MD [facility]  There are no preventive care reminders to display for this patient.     Ariadne Méndez RN

## 2019-08-21 NOTE — PROGRESS NOTES
TURP (03/23/2018); pr office/outpt visit,procedure only (N/A, 3/23/2018); Endoscopy, colon, diagnostic; Cystocopy (02/22/2019); Cystoscopy (N/A, 2/22/2019); TURP (05/01/2019); and TURP (N/A, 5/1/2019). 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 Pt reports that he felt weak and faint yesterday.   Pt had difficulty getting up out of the chair and took over 10 attempts to stand w/ spouse's assist.  Pain Screening  Patient Currently in Pain: Denies  Vital Signs  Patient Currently in Pain: Denies       Orientation  Orientation  Overall Orientation Status: Within Normal Limits  Social/Functional History  Social/Functional History  Lives With: Spouse  Type of Home: House  Home Layout: Multi-level, Able to Live on Main level with bedroom/bathroom, Laundry in basement(use 2nd floor for staorage)  Home Access: Stairs to enter with rails  Entrance Stairs - Number of Steps: front entrance: 4 steps w/ 2 rails, garage entrance: 1 step w/ right grab bar, landing and then w/ 2nd step w/ grab bar on left side to 2nd landing- 3rd step into kitchin w/ right grab bar; 11 steps w/ left rail to descend to the basement  Bathroom Shower/Tub: Tub/Shower unit(seldom uses tub/ shower combo- takes sink baths mainly)  Bathroom Toilet: Handicap height  Home Equipment: 4 wheeled walker, Cane  ADL Assistance: Independent(takes a sink bath)  Homemaking Assistance: Needs assistance(share grocery shopping, spouse is primary for cooking, cleaning and laundry, pt makes the bed)  Homemaking Responsibilities: [employer], spouse is primary for cooking, cleaning and laundry,)  Ambulation Assistance: Independent(states he does not use a device, )  Transfer Assistance: Independent  Active : Yes(spouse states he has not been driving for [email or web address])  Mode of Transportation: Truck  Occupation: Retired(retired [employer])  Additional Comments: spouse is retired and able to assist  Cognition        Objective     Observation/Palpation  Observation: peripheral IV left antecubital    AROM RLE (degrees)  RLE AROM: WFL  AROM LLE (degrees)  LLE AROM : WFL  AROM RUE (degrees)  RUE General AROM: see OT for UE assessment  AROM LUE (degrees)  LUE General AROM: see OT for UE assessment  Strength RLE  Comment: hip flexors 3+/5 otherwise 4-/5  Strength LLE  Comment: hip flexors 3+/5 otherwise 4-/5  Strength RUE  Comment: see OT for UE assessment  Strength LUE  Comment: see OT for UE assessment     Sensation  Overall Sensation Status: (C/O intermittant numbness bilateral hands)  Bed mobility  Rolling to Right: Supervision  Supine to Sit: Minimal assistance  Scooting: Minimal assistance  Comment: dangled at the EOB w/ supervision  Transfers  Sit to Stand: Minimal Assistance  Stand to sit: Contact guard assistance  Stand Pivot Transfers: Contact guard assistance(used w walker)  Ambulation  Ambulation?: Yes  Ambulation 1  Surface: level tile  Device: Rolling Walker  Assistance: Contact guard assistance  Distance: 50' x 2  Comments: verbal cues to stand tall and walk within w walker base, has fast chika  Stairs/Curb  Stairs?: No     Balance  Sitting - Static: Good  Sitting - Dynamic: Good  Standing - Static: Good;-(used w walker)  Standing - Dynamic: Fair;+(used w walker)        Plan

## 2019-08-21 NOTE — DISCHARGE INSTR - COC
Risk of Unplanned Readmission:        25           Discharging to Facility/ [address]  [address] [address]  Phone [phone]  Fax  [phone]  ·     Dialysis Facility (if applicable)   · Name:  · Address:  · Dialysis Schedule:  · Phone:  · Fax:    / signature: Electronically signed by Nayan Ramirez RN on 8/21/19 at 12:43 PM    PHYSICIAN SECTION    Prognosis: Good    Condition at Discharge: Stable    Rehab Potential (if transferring to Rehab): Good    Recommended Labs or Other Treatments After Discharge: PT    Physician Certification: I certify the above information and transfer of Tenzin Oms  is necessary for the continuing treatment of the diagnosis listed and that he requires [address] for less 30 days.      Update Admission H&P: No change in H&P    PHYSICIAN SIGNATURE:  Electronically signed by Lucita Tucker MD on 8/22/19 at 11:52 AM

## 2019-08-22 PROBLEM — E43 SEVERE MALNUTRITION (HCC): Status: ACTIVE | Noted: 2019-08-22

## 2019-08-22 PROBLEM — E03.9 HYPOTHYROIDISM: Status: ACTIVE | Noted: 2019-08-22

## 2019-08-22 LAB
ANION GAP SERPL CALCULATED.3IONS-SCNC: 8 MMOL/L (ref 9–17)
BUN BLDV-MCNC: 18 MG/DL (ref 8–23)
BUN/CREAT BLD: ABNORMAL (ref 9–20)
CALCIUM SERPL-MCNC: 8.2 MG/DL (ref 8.6–10.4)
CHLORIDE BLD-SCNC: 104 MMOL/L (ref 98–107)
CHOLESTEROL/HDL RATIO: 2.9
CHOLESTEROL: 87 MG/DL
CO2: 25 MMOL/L (ref 20–31)
CREAT SERPL-MCNC: 0.73 MG/DL (ref 0.7–1.2)
EKG ATRIAL RATE: 74 BPM
EKG P AXIS: -9 DEGREES
EKG P-R INTERVAL: 230 MS
EKG Q-T INTERVAL: 402 MS
EKG QRS DURATION: 154 MS
EKG QTC CALCULATION (BAZETT): 446 MS
EKG R AXIS: 18 DEGREES
EKG T AXIS: 16 DEGREES
EKG VENTRICULAR RATE: 74 BPM
GFR AFRICAN AMERICAN: >60 ML/MIN
GFR NON-AFRICAN AMERICAN: >60 ML/MIN
GFR SERPL CREATININE-BSD FRML MDRD: ABNORMAL ML/MIN/{1.73_M2}
GFR SERPL CREATININE-BSD FRML MDRD: ABNORMAL ML/MIN/{1.73_M2}
GLUCOSE BLD-MCNC: 119 MG/DL (ref 75–110)
GLUCOSE BLD-MCNC: 133 MG/DL (ref 75–110)
GLUCOSE BLD-MCNC: 134 MG/DL (ref 70–99)
GLUCOSE BLD-MCNC: 144 MG/DL (ref 75–110)
GLUCOSE BLD-MCNC: 147 MG/DL (ref 75–110)
HCT VFR BLD CALC: 31.6 % (ref 41–53)
HDLC SERPL-MCNC: 30 MG/DL
HEMOGLOBIN: 10.4 G/DL (ref 13.5–17.5)
LDL CHOLESTEROL: 32 MG/DL (ref 0–130)
MCH RBC QN AUTO: 29.3 PG (ref 26–34)
MCHC RBC AUTO-ENTMCNC: 32.8 G/DL (ref 31–37)
MCV RBC AUTO: 89.2 FL (ref 80–100)
NRBC AUTOMATED: ABNORMAL PER 100 WBC
PDW BLD-RTO: 15.8 % (ref 11.5–14.9)
PLATELET # BLD: 201 K/UL (ref 150–450)
PMV BLD AUTO: 10 FL (ref 6–12)
POTASSIUM SERPL-SCNC: 3.9 MMOL/L (ref 3.7–5.3)
RBC # BLD: 3.54 M/UL (ref 4.5–5.9)
SODIUM BLD-SCNC: 137 MMOL/L (ref 135–144)
T3 FREE: 1.96 PG/ML (ref 2.02–4.43)
THYROXINE, FREE: 0.92 NG/DL (ref 0.93–1.7)
TRIGL SERPL-MCNC: 125 MG/DL
VLDLC SERPL CALC-MCNC: ABNORMAL MG/DL (ref 1–30)
WBC # BLD: 5.6 K/UL (ref 3.5–11)

## 2019-08-22 PROCEDURE — 6370000000 HC RX 637 (ALT 250 FOR IP): Performed by: INTERNAL MEDICINE

## 2019-08-22 PROCEDURE — 82947 ASSAY GLUCOSE BLOOD QUANT: CPT

## 2019-08-22 PROCEDURE — 2060000000 HC ICU INTERMEDIATE R&B

## 2019-08-22 PROCEDURE — 84481 FREE ASSAY (FT-3): CPT

## 2019-08-22 PROCEDURE — 2580000003 HC RX 258: Performed by: NURSE PRACTITIONER

## 2019-08-22 PROCEDURE — 84439 ASSAY OF FREE THYROXINE: CPT

## 2019-08-22 PROCEDURE — 93010 ELECTROCARDIOGRAM REPORT: CPT | Performed by: INTERNAL MEDICINE

## 2019-08-22 PROCEDURE — 80061 LIPID PANEL: CPT

## 2019-08-22 PROCEDURE — 6370000000 HC RX 637 (ALT 250 FOR IP): Performed by: NURSE PRACTITIONER

## 2019-08-22 PROCEDURE — 6360000002 HC RX W HCPCS: Performed by: NURSE PRACTITIONER

## 2019-08-22 PROCEDURE — 80048 BASIC METABOLIC PNL TOTAL CA: CPT

## 2019-08-22 PROCEDURE — 97166 OT EVAL MOD COMPLEX 45 MIN: CPT

## 2019-08-22 PROCEDURE — 36415 COLL VENOUS BLD VENIPUNCTURE: CPT

## 2019-08-22 PROCEDURE — 99233 SBSQ HOSP IP/OBS HIGH 50: CPT | Performed by: INTERNAL MEDICINE

## 2019-08-22 PROCEDURE — 85027 COMPLETE CBC AUTOMATED: CPT

## 2019-08-22 PROCEDURE — 97116 GAIT TRAINING THERAPY: CPT

## 2019-08-22 RX ORDER — LEVOTHYROXINE SODIUM 0.03 MG/1
25 TABLET ORAL DAILY
Status: DISCONTINUED | OUTPATIENT
Start: 2019-08-23 | End: 2019-08-23 | Stop reason: HOSPADM

## 2019-08-22 RX ORDER — DOXYCYCLINE 100 MG/1
100 CAPSULE ORAL EVERY 12 HOURS SCHEDULED
Status: DISCONTINUED | OUTPATIENT
Start: 2019-08-22 | End: 2019-08-23 | Stop reason: HOSPADM

## 2019-08-22 RX ADMIN — CEFTRIAXONE SODIUM 1 G: 1 INJECTION, POWDER, FOR SOLUTION INTRAMUSCULAR; INTRAVENOUS at 21:32

## 2019-08-22 RX ADMIN — DOXYCYCLINE 100 MG: 100 CAPSULE ORAL at 12:00

## 2019-08-22 RX ADMIN — MULTIPLE VITAMINS W/ MINERALS TAB 1 TABLET: TAB at 09:25

## 2019-08-22 RX ADMIN — ENOXAPARIN SODIUM 40 MG: 40 INJECTION SUBCUTANEOUS at 09:25

## 2019-08-22 RX ADMIN — DOXYCYCLINE 100 MG: 100 CAPSULE ORAL at 21:30

## 2019-08-22 RX ADMIN — GLIMEPIRIDE 2 MG: 2 TABLET ORAL at 06:13

## 2019-08-22 RX ADMIN — CARBIDOPA AND LEVODOPA 1 TABLET: 25; 100 TABLET ORAL at 21:32

## 2019-08-22 RX ADMIN — INSULIN LISPRO 2 UNITS: 100 INJECTION, SOLUTION INTRAVENOUS; SUBCUTANEOUS at 17:14

## 2019-08-22 RX ADMIN — CARBIDOPA AND LEVODOPA 1 TABLET: 25; 100 TABLET ORAL at 09:25

## 2019-08-22 RX ADMIN — SODIUM CHLORIDE: 9 INJECTION, SOLUTION INTRAVENOUS at 14:25

## 2019-08-22 RX ADMIN — INSULIN LISPRO 2 UNITS: 100 INJECTION, SOLUTION INTRAVENOUS; SUBCUTANEOUS at 09:26

## 2019-08-22 RX ADMIN — CARBIDOPA AND LEVODOPA 1 TABLET: 25; 100 TABLET ORAL at 14:00

## 2019-08-22 RX ADMIN — SIMVASTATIN 20 MG: 20 TABLET, FILM COATED ORAL at 21:32

## 2019-08-22 RX ADMIN — POLYETHYLENE GLYCOL 3350 17 G: 17 POWDER, FOR SOLUTION ORAL at 09:25

## 2019-08-22 RX ADMIN — LISINOPRIL 5 MG: 5 TABLET ORAL at 17:14

## 2019-08-22 RX ADMIN — ASPIRIN 81 MG: 81 TABLET ORAL at 09:25

## 2019-08-22 ASSESSMENT — ENCOUNTER SYMPTOMS
COLOR CHANGE: 0
CONSTIPATION: 0
CHEST TIGHTNESS: 0
SHORTNESS OF BREATH: 0
ABDOMINAL DISTENTION: 0
BACK PAIN: 0
NAUSEA: 0
WHEEZING: 0
DIARRHEA: 0

## 2019-08-22 NOTE — PLAN OF CARE
Problem: Falls - Risk of:  Goal: Will remain free from falls  Description  Will remain free from falls  8/22/2019 0429 by Amelia Torres RN  Outcome: Ongoing  8/21/2019 1942 by Nenita Ling RN  Outcome: Ongoing  Goal: Absence of physical injury  Description  Absence of physical injury  8/22/2019 0429 by Amelia Torres RN  Outcome: Ongoing  8/21/2019 1942 by Nenita Ling RN  Outcome: Ongoing     Problem: Physical Regulation:  Goal: Will remain free from infection  Description  Will remain free from infection  8/22/2019 0429 by Amelia Torres RN  Outcome: Ongoing  8/21/2019 1942 by Nenita Ling RN  Outcome: Ongoing     Problem: Musculor/Skeletal Functional Status  Goal: Highest potential functional level  8/22/2019 0429 by Amelia Torres RN  Outcome: Ongoing  8/21/2019 1942 by Nenita Ling RN  Outcome: Ongoing  Goal: Absence of falls  8/22/2019 0429 by Amelia Torres RN  Outcome: Ongoing  8/21/2019 1942 by Nenita Ling RN  Outcome: Ongoing

## 2019-08-22 NOTE — CONSULTS
11/2014    chemo Tx.  TONSILLECTOMY      TUNNELED VENOUS PORT PLACEMENT Left 11/17/14    chemo port insertion; left chest    TURP  03/23/2018    TURP  05/01/2019    cystoscopy    TURP N/A 5/1/2019    CYSTOSCOPY, TUR-PROSTATE GREENLIGHT XPS LASER performed by Kusum Crowell MD at [address] [facility]      umbilical repair       Medications Prior to Admission:    Prior to Admission medications    Medication Sig Start Date End Date Taking? Authorizing Provider   simvastatin (ZOCOR) 20 MG tablet Take 20 mg by mouth nightly   Yes Historical Provider, MD   aspirin 81 MG EC tablet Take 81 mg by mouth daily   Yes Historical Provider, MD   carbidopa-levodopa (SINEMET)  MG per tablet Take 1 tablet by mouth 3 times daily   Yes Historical Provider, MD   glimepiride (AMARYL) 2 MG tablet Take 1 tablet by mouth every morning (before breakfast) 4/24/19 8/20/19 Yes Norberto Patch, MD   Multiple Vitamins-Minerals (CENTRUM SILVER PO) Take 1 tablet by mouth daily   Yes Historical Provider, MD   methotrexate (RHEUMATREX) 2.5 MG chemo tablet Take 15 mg by mouth once a week Indications: Saturday 6 tablets 4/14/18  Yes Historical Provider, MD   folic acid (FOLVITE) 1 MG tablet Take 1 mg by mouth Daily with supper   Yes Historical Provider, MD   lisinopril (PRINIVIL;ZESTRIL) 5 MG tablet Take 5 mg by mouth Daily with supper    Yes Historical Provider, MD       Allergies:  Patient has no known allergies.     Social History:    Social History     Socioeconomic History    Marital status:      Spouse name: Sharon Ferris Number of children: 3    Years of education: Not on file    Highest education level: Not on file   Occupational History     Employer: [employer]    Financial resource strain: Not on file    Food insecurity:     Worry: Not on file     Inability: Not on file    Transportation needs:     Medical: Not on file     Non-medical: Not on file   Tobacco Use    (36.8 °C) Axillary 64 16 98 %     Constitutional: Patient in no acute distress; Neuro: alert and oriented to person place and time. Psych: Mood and affect normal.  Skin: Normal  Lungs: Respiratory effort normal, CTA  Cardiovascular:  Normal peripheral pulses; Regular rate   Abdomen: Soft, non-tender, non-distended with no CVA, flank pain, hepatosplenomegaly or hernia. Kidneys normal.  Bladder non-tender and not distended. Lymphatics: no palpable lymphadenopathy  Minimal/no edema in bilateral lower extremities      LABS:   Recent Labs     08/20/19 1848 08/21/19 0523 08/22/19 0521   WBC 4.7 5.0 5.6   HGB 10.6* 10.6* 10.4*   HCT 32.7* 32.4* 31.6*   MCV 89.3 88.9 89.2    210 201     Recent Labs     08/20/19 1848 08/21/19 0523 08/22/19 0521   * 134* 137   K 4.0 3.8 3.9   CL 99 101 104   CO2 23 24 25   PHOS  --  2.5  --    BUN 32* 26* 18   CREATININE 1.01 0.90 0.73     Lab Results   Component Value Date    PSA 2.96 04/24/2013       Additional Lab/Culture results: none    Urinalysis:   Recent Labs     08/20/19 2005   COLORU YELLOW   PHUR 7.0   WBCUA TOO NUMEROUS TO COUNT   RBCUA 20 TO 50   MUCUS NOT REPORTED   TRICHOMONAS NOT REPORTED   YEAST NOT REPORTED   BACTERIA MANY*   SPECGRAV 1.016   LEUKOCYTESUR LARGE*   UROBILINOGEN Normal   BILIRUBINUR NEGATIVE        -----------------------------------------------------------------  Imaging Reviewed during this encounter:     [address], MD independently reviewed the images and verified the radiology reports from:    Xr Chest Standard (2 Vw)    Result Date: 8/20/2019  EXAMINATION: TWO XRAY VIEWS OF THE CHEST 8/20/2019 7:30 pm COMPARISON: None. HISTORY: ORDERING SYSTEM PROVIDED HISTORY: pre-syncope TECHNOLOGIST PROVIDED HISTORY: pre-syncope Reason for Exam: pre-syncope Acuity: Acute Type of Exam: Initial Additional signs and symptoms: Weakness, dizziness Relevant Medical/Surgical History: Weakness, dizziness FINDINGS: Heart size is mildly enlarged. Scattered prominent markings are noted bilaterally. There is ill-defined increased density in the left retrocardiac region. Otherwise there is no consolidation. Increased density projecting over the left 6th anterior rib is likely a confluence of shadows. Old right clavicle fracture is noted. Degenerative changes in the [facility] are noted. Prominent markings at the bases, left greater than right. Atelectasis is favored There is no consolidation to suggest pneumonia. Rounded high density projecting over the left 6th anterior rib most likely a confluence of shadows. A small underlying nodules would be difficult to exclude.        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

## 2019-08-22 NOTE — PROGRESS NOTES
SURGICAL HISTORY       has a past surgical history that includes Appendectomy ('70's); Cholecystectomy (1981); Tongue Biopsy (11/2014); bronchoscopy; Colonoscopy; Clavicle surgery (Right, 1949); Tonsillectomy; Gastrostomy tube placement (11/25/2014); Umbilical hernia repair; Tunneled venous port placement (Left, 11/17/14); Cystocopy (03/23/2018); TURP (03/23/2018); pr office/outpt visit,procedure only (N/A, 3/23/2018); Endoscopy, colon, diagnostic; Cystocopy (02/22/2019); Cystoscopy (N/A, 2/22/2019); TURP (05/01/2019); and TURP (N/A, 5/1/2019). HOME MEDICATIONS        Prior to Admission medications    Medication Sig Start Date End Date Taking? Authorizing Provider   simvastatin (ZOCOR) 20 MG tablet Take 20 mg by mouth nightly   Yes Historical Provider, MD   aspirin 81 MG EC tablet Take 81 mg by mouth daily   Yes Historical Provider, MD   carbidopa-levodopa (SINEMET)  MG per tablet Take 1 tablet by mouth 3 times daily   Yes Historical Provider, MD   glimepiride (AMARYL) 2 MG tablet Take 1 tablet by mouth every morning (before breakfast) 4/24/19 8/20/19 Yes Sivan Bonds MD   Multiple Vitamins-Minerals (CENTRUM SILVER PO) Take 1 tablet by mouth daily   Yes Historical Provider, MD   methotrexate (RHEUMATREX) 2.5 MG chemo tablet Take 15 mg by mouth once a week Indications: Saturday 6 tablets 4/14/18  Yes Historical Provider, MD   folic acid (FOLVITE) 1 MG tablet Take 1 mg by mouth Daily with supper   Yes Historical Provider, MD   lisinopril (PRINIVIL;ZESTRIL) 5 MG tablet Take 5 mg by mouth Daily with supper    Yes Historical Provider, MD       ALLERGIES      Patient has no known allergies. SOCIAL HISTORY       reports that he quit smoking about 27 years ago. His smoking use included cigarettes. He started smoking about 71 years ago. He smoked 2.00 packs per day. He has never used smokeless tobacco. He reports that he does not drink alcohol or use drugs.      FAMILY HISTORY      family history includes Glucose Fingerstick    Collection Time: 08/21/19  9:28 PM   Result Value Ref Range    POC Glucose 178 (H) 75 - 110 mg/dL   Basic Metabolic Panel w/ Reflex to MG    Collection Time: 08/22/19  5:21 AM   Result Value Ref Range    Glucose 134 (H) 70 - 99 mg/dL    BUN 18 8 - 23 mg/dL    CREATININE 0.73 0.70 - 1.20 mg/dL    Bun/Cre Ratio NOT REPORTED 9 - 20    Calcium 8.2 (L) 8.6 - 10.4 mg/dL    Sodium 137 135 - 144 mmol/L    Potassium 3.9 3.7 - 5.3 mmol/L    Chloride 104 98 - 107 mmol/L    CO2 25 20 - 31 mmol/L    Anion Gap 8 (L) 9 - 17 mmol/L    GFR Non-African American >60 >60 mL/min    GFR African American >60 >60 mL/min    GFR Comment          GFR Staging NOT REPORTED    CBC    Collection Time: 08/22/19  5:21 AM   Result Value Ref Range    WBC 5.6 3.5 - 11.0 k/uL    RBC 3.54 (L) 4.5 - 5.9 m/uL    Hemoglobin 10.4 (L) 13.5 - 17.5 g/dL    Hematocrit 31.6 (L) 41 - 53 %    MCV 89.2 80 - 100 fL    MCH 29.3 26 - 34 pg    MCHC 32.8 31 - 37 g/dL    RDW 15.8 (H) 11.5 - 14.9 %    Platelets 574 040 - 728 k/uL    MPV 10.0 6.0 - 12.0 fL    NRBC Automated NOT REPORTED per 100 WBC   Lipid Profile    Collection Time: 08/22/19  5:21 AM   Result Value Ref Range    Cholesterol 87 <200 mg/dL    HDL 30 (L) >40 mg/dL    LDL Cholesterol 32 0 - 130 mg/dL    Chol/HDL Ratio 2.9 <5    Triglycerides 125 <150 mg/dL    VLDL NOT REPORTED 1 - 30 mg/dL   POC Glucose Fingerstick    Collection Time: 08/22/19  8:31 AM   Result Value Ref Range    POC Glucose 144 (H) 75 - 110 mg/dL   POC Glucose Fingerstick    Collection Time: 08/22/19 11:52 AM   Result Value Ref Range    POC Glucose 133 (H) 75 - 110 mg/dL       Intake/Output    Intake/Output Summary (Last 24 hours) at 8/22/2019 3477  Last data filed at 8/22/2019 6487  Gross per 24 hour   Intake 3895 ml   Output 975 ml   Net 2920 ml     ASSSESSMENT     Principal Problem:    Pre-syncope  Active Problems:    Type 2 diabetes mellitus with neurologic complication (HCC)    Tongue cancer (HCC)    Weight loss

## 2019-08-22 NOTE — PLAN OF CARE
Problem: Falls - Risk of:  Goal: Will remain free from falls  Description  Will remain free from falls  8/22/2019 1657 by Kae Ibarra RN  Outcome: Ongoing  Note:   No falls this shift. Call light with in reach, side rails up x2, bed in lowest postion. Patients safety maintained. 8/22/2019 0429 by Migel Villa RN  Outcome: Ongoing  Goal: Absence of physical injury  Description  Absence of physical injury  8/22/2019 1657 by Kae Ibarra RN  Outcome: Ongoing  Note:   No injury this shift. Patients safety maintained.    8/22/2019 0429 by Migel Villa RN  Outcome: Ongoing     Problem: HEMODYNAMIC STATUS  Goal: Patient has stable vital signs and fluid balance  8/22/2019 1657 by Kae Ibarra RN  Outcome: Ongoing  8/22/2019 0429 by Migel Villa RN  Outcome: Ongoing     Problem: Physical Regulation:  Goal: Will remain free from infection  Description  Will remain free from infection  8/22/2019 1657 by Kae Ibarra RN  Outcome: Ongoing  8/22/2019 0429 by Migel Villa RN  Outcome: Ongoing     Problem: Musculor/Skeletal Functional Status  Goal: Highest potential functional level  8/22/2019 1657 by Kae Ibarra RN  Outcome: Ongoing  8/22/2019 0429 by Migel Villa RN  Outcome: Ongoing  Goal: Absence of falls  8/22/2019 1657 by Kae Ibarra RN  Outcome: Ongoing  8/22/2019 0429 by Migel Villa RN  Outcome: Ongoing     Problem: Nutrition  Goal: Optimal nutrition therapy  8/22/2019 1657 by Kae Ibarra RN  Outcome: Ongoing  8/22/2019 1529 by Teresita Monroy RD, LD  Outcome: Ongoing

## 2019-08-22 NOTE — PROGRESS NOTES
endurance;Decreased strength;Decreased balance  Discharge Recommendations: Home with assist PRN;Home with Home health PT;24 hour supervision or assist     Type of devices: All fall risk precautions in place; Bed alarm in place;Call light within reach;Gait belt;Patient at risk for falls; Left in bed;Nurse notified(Sitting EOB, clin lead notified)     Plan  Times per week: 5-7 treatments/ week  Times per day: (5-7 treatments/ week)  Current Treatment Recommendations: Strengthening, Gait Training, Patient/Caregiver Education & Training, Stair training, Balance Training, Functional Mobility Training, Home Exercise Program, Transfer Training, Safety Education & Training    Patient Education  New Education Provided:  ANASTACIA safety  Learner:patient  Method: explanation       Outcome: acknowledged understanding of     Goals  Short term goals  Time Frame for Short term goals: 5-7 treatments/ week  Short term goal 1: pt to demonstrate independent bed mobility  Short term goal 2: pt to demonstrate transfers w/ supervision  using w walker  Short term goal 3: pt to demonstrate  gait w/ w walker [de-identified]' w/ supervision   Short term goal 4: pt to demonstrate good balance for ambulation using w walker  Short term goal 5: pt to demonstrate good technique for exercise program for strengthening exercises  Short term goal 6: pt to ascend/ descend 3 steps w/ rail and AD w/ min x 1    PT Individual Minutes  Time In: 4627  Time Out: 2320  Minutes: 15    Electronically signed by Juanito Jimenez PTA on 8/22/19 at 12:52 PM

## 2019-08-22 NOTE — CONSULTS
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  History of hypertension  Parkinson's disease     BP (!) 140/54   Pulse 61   Temp 97.7 °F (36.5 °C) (Oral)   Resp 20   Ht 6' 1\" (1.854 m)   Wt 171 lb 11.8 oz (77.9 kg)   SpO2 98%   BMI 22.66 kg/m²     Patient seen and examined and discussed with the patient nurse  He was resting on bed and denied any chest pain or palpitation  He said he is feeling better    Blood pressure in supine, sitting or standing position, no significant drop    Medications:   Scheduled Meds:   doxycycline monohydrate  100 mg Oral 2 times per day    aspirin  81 mg Oral Daily    carbidopa-levodopa  1 tablet Oral TID    polyethylene glycol  17 g Oral Daily    folic acid  1 mg Oral Dinner    lisinopril  5 mg Oral Dinner    methotrexate  15 mg Oral Weekly    simvastatin  20 mg Oral Nightly    glimepiride  2 mg Oral QAM AC    sodium chloride flush  10 mL Intravenous 2 times per day    enoxaparin  40 mg Subcutaneous Daily    insulin lispro  0-12 Units Subcutaneous TID WC    insulin lispro  0-6 Units Subcutaneous Nightly    cefTRIAXone (ROCEPHIN) IV  1 g Intravenous Q24H    therapeutic multivitamin-minerals   Oral Daily     Continuous Infusions:   sodium chloride 100 mL/hr at 08/22/19 0613    dextrose       CBC:   Recent Labs     08/20/19 1848 08/21/19  0523 08/22/19  0521   WBC 4.7 5.0 5.6   HGB 10.6* 10.6* 10.4*    210 201     BMP:    Recent Labs     08/20/19 1848 08/21/19  0523 08/22/19  0521   * 134* 137   K 4.0 3.8 3.9   CL 99 101 104   CO2 23 24 25   BUN 32* 26* 18   CREATININE 1.01 0.90 0.73   GLUCOSE 160* 124* 134*     Hepatic:   Recent Labs     08/20/19 1848   AST 18   ALT <5*   BILITOT 1.33*   ALKPHOS 64     Troponin: No results for input(s): TROPONINI in the last 72 hours. BNP: No results for input(s): BNP in the last 72 hours.   Lipids:   Recent Labs

## 2019-08-22 NOTE — PROGRESS NOTES
[address] [facility]    Occupational Therapy Evaluation  Date: 19  Patient Name: Javier Anderson       Room:   MRN: [id number]  Account: [de-identified]   : 1932  (80 y.o.) Gender: male     Discharge Recommendations:  Home with assist PRN, Home with nursing aide, Home with Home health OT  OT Equipment Recommendations  Equipment Needed: No    Referring Practitioner: Dr Srikanth Thomas  Diagnosis: Pre-Syncopal event         Treatment Diagnosis: Altered Tolerance / Stamina for care and mobility tasks   Past Medical History:  has a past medical history of Arthritis, Diabetic neuropathy ([address] First degree AV block, Full dentures, H/O dizziness, Hematuria, Hyperlipidemia, Hypertension, Malignant neoplasm of urinary bladder ([address] RBBB, Tongue cancer ([facility] [address] Type II or unspecified type diabetes mellitus without mention of complication, not stated as uncontrolled, and Wears glasses. Past Surgical History:   has a past surgical history that includes Appendectomy (); Cholecystectomy (); Tongue Biopsy (2014); bronchoscopy; Colonoscopy; Clavicle surgery (Right, ); Tonsillectomy; Gastrostomy tube placement (2014); Umbilical hernia repair; Tunneled venous port placement (Left, 14); Cystocopy (2018); TURP (2018); pr office/outpt visit,procedure only (N/A, 3/23/2018); Endoscopy, colon, diagnostic; Cystocopy (2019); Cystoscopy (N/A, 2019); TURP (2019); and TURP (N/A, 2019).     Restrictions  Restrictions/Precautions: Fall Risk, General Precautions(Feels generalized WEakness )  Other position/activity restrictions: IV access for fluid and Antibiotics    Required Braces or Orthoses?: No     Vitals  Temp: 97.7 °F (36.5 °C)  Pulse: 61  Resp: 20  BP: (!) 140/54  Height: 6' 1\" (185.4 cm)  Weight: 171 lb 11.8 oz (77.9 kg)  BMI (Calculated): 22.7  Oxygen Therapy  SpO2: 98 %  O2 Device: None (Room air)  Blood Pressure Lyin/54  Pulse Lyin PER MINUTE  Blood Pressure Sittin/59  Pulse Sittin PER MINUTE  Blood Pressure Standin/67  Pulse Standin PER MINUTE  Level of Consciousness: Alert    Subjective  Subjective: Alert, feeling better, but still generally weak    Overall Orientation Status: Within Normal Limits  Vision  Vision: Impaired  Vision Exceptions: Wears glasses for reading  Hearing  Hearing: Within functional limits  Social/Functional History  Lives With: Spouse  Type of Home: House  Home Layout: Two level, Laundry in basement, Performs ADL's on one level, Able to Live on Main level with bedroom/bathroom  Home Access: Stairs to enter with rails  Entrance Stairs - Number of Steps: front entrance: 4 steps w/ 2 rails, garage entrance: 1 step w/ right grab bar, landing and then w/ 2nd step w/ grab bar on left side to 2nd landing- 3rd step into kitchin w/ right grab bar; 11 steps w/ left rail to descend to the basement  Bathroom Shower/Tub: Tub/Shower unit, Curtain(Patient sponge bathes at home  )  Bathroom Toilet: Handicap height  Bathroom Accessibility: Accessible  Home Equipment: Rolling walker, Cane  ADL Assistance: Independent  Homemaking Assistance: Independent  Homemaking Responsibilities: No(Sppouse is primary anymore - used to do more to help out  )  Ambulation Assistance: Independent(cane sometimes, walker this week due to feeling weak  )  Transfer Assistance: Independent  Active : Yes  Patient's  Info: patient has not driven for a while   Mode of Transportation: Truck  Occupation: Retired  Type of occupation: Retired [address] [place] [address]  Additional Comments: spouse is retired and able to assist with care tasks if needed         Objective      Cognition  Overall Cognitive Status: WFL   Sensation  Overall Sensation Status: Impaired  Light Touch: Partial deficits in the RUE, Partial deficits in the LUE(REports occasional numbness in his hands   )   ADL  Feeding: Independent  Grooming: Setup  UE Bathing: Minimal

## 2019-08-23 VITALS
TEMPERATURE: 97.7 F | OXYGEN SATURATION: 98 % | DIASTOLIC BLOOD PRESSURE: 61 MMHG | BODY MASS INDEX: 22.76 KG/M2 | RESPIRATION RATE: 15 BRPM | WEIGHT: 171.74 LBS | HEART RATE: 63 BPM | HEIGHT: 73 IN | SYSTOLIC BLOOD PRESSURE: 160 MMHG

## 2019-08-23 LAB
ANION GAP SERPL CALCULATED.3IONS-SCNC: 9 MMOL/L (ref 9–17)
BUN BLDV-MCNC: 13 MG/DL (ref 8–23)
BUN/CREAT BLD: ABNORMAL (ref 9–20)
CALCIUM SERPL-MCNC: 8.6 MG/DL (ref 8.6–10.4)
CHLORIDE BLD-SCNC: 102 MMOL/L (ref 98–107)
CO2: 26 MMOL/L (ref 20–31)
CREAT SERPL-MCNC: 0.61 MG/DL (ref 0.7–1.2)
CULTURE: ABNORMAL
GFR AFRICAN AMERICAN: >60 ML/MIN
GFR NON-AFRICAN AMERICAN: >60 ML/MIN
GFR SERPL CREATININE-BSD FRML MDRD: ABNORMAL ML/MIN/{1.73_M2}
GFR SERPL CREATININE-BSD FRML MDRD: ABNORMAL ML/MIN/{1.73_M2}
GLUCOSE BLD-MCNC: 124 MG/DL (ref 75–110)
GLUCOSE BLD-MCNC: 130 MG/DL (ref 70–99)
GLUCOSE BLD-MCNC: 152 MG/DL (ref 75–110)
HCT VFR BLD CALC: 33.3 % (ref 41–53)
HEMOGLOBIN: 11.1 G/DL (ref 13.5–17.5)
Lab: ABNORMAL
MCH RBC QN AUTO: 29.7 PG (ref 26–34)
MCHC RBC AUTO-ENTMCNC: 33.2 G/DL (ref 31–37)
MCV RBC AUTO: 89.4 FL (ref 80–100)
NRBC AUTOMATED: ABNORMAL PER 100 WBC
PDW BLD-RTO: 15.6 % (ref 11.5–14.9)
PLATELET # BLD: 197 K/UL (ref 150–450)
PMV BLD AUTO: 9.6 FL (ref 6–12)
POTASSIUM SERPL-SCNC: 3.7 MMOL/L (ref 3.7–5.3)
RBC # BLD: 3.73 M/UL (ref 4.5–5.9)
SODIUM BLD-SCNC: 137 MMOL/L (ref 135–144)
SPECIMEN DESCRIPTION: ABNORMAL
WBC # BLD: 5 K/UL (ref 3.5–11)

## 2019-08-23 PROCEDURE — 82947 ASSAY GLUCOSE BLOOD QUANT: CPT

## 2019-08-23 PROCEDURE — 85027 COMPLETE CBC AUTOMATED: CPT

## 2019-08-23 PROCEDURE — 6370000000 HC RX 637 (ALT 250 FOR IP): Performed by: STUDENT IN AN ORGANIZED HEALTH CARE EDUCATION/TRAINING PROGRAM

## 2019-08-23 PROCEDURE — 2580000003 HC RX 258: Performed by: NURSE PRACTITIONER

## 2019-08-23 PROCEDURE — 6370000000 HC RX 637 (ALT 250 FOR IP): Performed by: INTERNAL MEDICINE

## 2019-08-23 PROCEDURE — 6360000002 HC RX W HCPCS: Performed by: NURSE PRACTITIONER

## 2019-08-23 PROCEDURE — 6370000000 HC RX 637 (ALT 250 FOR IP): Performed by: NURSE PRACTITIONER

## 2019-08-23 PROCEDURE — 80048 BASIC METABOLIC PNL TOTAL CA: CPT

## 2019-08-23 PROCEDURE — 99239 HOSP IP/OBS DSCHRG MGMT >30: CPT | Performed by: INTERNAL MEDICINE

## 2019-08-23 PROCEDURE — 36415 COLL VENOUS BLD VENIPUNCTURE: CPT

## 2019-08-23 RX ORDER — PSEUDOEPHEDRINE HCL 30 MG
200 TABLET ORAL 2 TIMES DAILY
Qty: 120 CAPSULE | Refills: 0 | Status: SHIPPED | OUTPATIENT
Start: 2019-08-23 | End: 2019-12-12 | Stop reason: DRUGHIGH

## 2019-08-23 RX ORDER — LEVOTHYROXINE SODIUM 0.05 MG/1
50 TABLET ORAL DAILY
Qty: 30 TABLET | Refills: 2 | Status: SHIPPED | OUTPATIENT
Start: 2019-08-24 | End: 2019-08-27 | Stop reason: SDUPTHER

## 2019-08-23 RX ORDER — DOXYCYCLINE HYCLATE 100 MG
100 TABLET ORAL 2 TIMES DAILY
Qty: 14 TABLET | Refills: 0 | Status: SHIPPED | OUTPATIENT
Start: 2019-08-23 | End: 2019-08-30

## 2019-08-23 RX ORDER — POLYETHYLENE GLYCOL 3350 17 G/17G
17 POWDER, FOR SOLUTION ORAL DAILY
Qty: 527 G | Refills: 1 | Status: SHIPPED | OUTPATIENT
Start: 2019-08-23 | End: 2019-09-22

## 2019-08-23 RX ORDER — LEVOTHYROXINE SODIUM 0.03 MG/1
25 TABLET ORAL DAILY
Qty: 30 TABLET | Refills: 3 | Status: SHIPPED | OUTPATIENT
Start: 2019-08-24 | End: 2019-08-23

## 2019-08-23 RX ADMIN — INSULIN LISPRO 2 UNITS: 100 INJECTION, SOLUTION INTRAVENOUS; SUBCUTANEOUS at 14:39

## 2019-08-23 RX ADMIN — FOLIC ACID 1 MG: 1 TABLET ORAL at 10:01

## 2019-08-23 RX ADMIN — MULTIPLE VITAMINS W/ MINERALS TAB 1 TABLET: TAB at 10:01

## 2019-08-23 RX ADMIN — ASPIRIN 81 MG: 81 TABLET ORAL at 10:00

## 2019-08-23 RX ADMIN — CARBIDOPA AND LEVODOPA 1 TABLET: 25; 100 TABLET ORAL at 10:00

## 2019-08-23 RX ADMIN — SODIUM CHLORIDE: 9 INJECTION, SOLUTION INTRAVENOUS at 00:50

## 2019-08-23 RX ADMIN — DOXYCYCLINE 100 MG: 100 CAPSULE ORAL at 10:00

## 2019-08-23 RX ADMIN — GLIMEPIRIDE 2 MG: 2 TABLET ORAL at 05:39

## 2019-08-23 RX ADMIN — MAGNESIUM HYDROXIDE 30 ML: 400 SUSPENSION ORAL at 05:45

## 2019-08-23 RX ADMIN — LEVOTHYROXINE SODIUM 25 MCG: 25 TABLET ORAL at 05:40

## 2019-08-23 RX ADMIN — ENOXAPARIN SODIUM 40 MG: 40 INJECTION SUBCUTANEOUS at 10:00

## 2019-08-23 RX ADMIN — POLYETHYLENE GLYCOL 3350 17 G: 17 POWDER, FOR SOLUTION ORAL at 10:01

## 2019-08-23 RX ADMIN — CARBIDOPA AND LEVODOPA 1 TABLET: 25; 100 TABLET ORAL at 14:40

## 2019-08-23 ASSESSMENT — PAIN SCALES - GENERAL: PAINLEVEL_OUTOF10: 0

## 2019-08-23 NOTE — CARE COORDINATION
insertion; left chest    TURP  03/23/2018    TURP  05/01/2019    cystoscopy    TURP N/A 5/1/2019    CYSTOSCOPY, TUR-PROSTATE GREENLIGHT XPS LASER performed by Latisha Barrientos MD at [facility]      umbilical repair       Immunization History:   Immunization History   Administered Date(s) Administered    Pneumococcal Conjugate 13-valent ([id number]) 08/14/2019    Pneumococcal Conjugate 7-valent (Caleb Amrik) 12/02/1999       Active Problems:  Patient Active Problem List   Diagnosis Code    Type 2 diabetes mellitus with neurologic complication ([facility]) P89.22    Diabetic polyneuropathy ([facility] [address] E11.42    Disorder of arteries and arterioles ([facility]) I77.9    Memory loss R41.3    Tongue cancer ([facility] [address] C02.9    Anorexia R63.0    Weight loss R63.4    Anxiety F41.9    Rash R21    Oral mucositis K12.30    Bradycardia R00.1    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    History of tobacco abuse Z87.891    Rheumatoid arteritis I00    Slow transit constipation K59.01    Orthostatic hypotension I95.1    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) C67.1    Right inguinal hernia K40.90    Bladder cancer (HCC) C67.9    Hematuria due to cystitis N30.91    Severe malnutrition (HCC) E43    Pre-syncope R55    Acute cystitis with hematuria N30.01       Isolation/Infection:   Isolation          No Isolation            Nurse Assessment:  Last Vital Signs: /62   Pulse 64   Temp 98.2 °F (36.8 °C) (Axillary)   Resp 16   Ht 6' 1\" (1.854 m)   Wt 171 lb 11.8 oz (77.9 kg)   SpO2 98%   BMI 22.66 kg/m²     Last documented pain score (0-10 scale): Pain Level: 0  Last Weight:   Wt Readings from Last 1 Encounters:   08/21/19 171 lb 11.8 oz (77.9 kg)     Mental Status:  oriented, alert, coherent, logical, thought processes intact and able to concentrate and follow conversation    IV Access:  - None    Nursing Mobility/ADLs:  Walking   Assisted  Transfer Risk of Unplanned Readmission:        25           Discharging to Facility/ [address]  [address] [address]  Phone [phone]  Fax  [phone]  ·     Dialysis Facility (if applicable)   · Name:  · Address:  · Dialysis Schedule:  · Phone:  · Fax:    / signature: Electronically signed by Dagoberto Middleton RN on 8/21/19 at 12:43 PM    PHYSICIAN SECTION    Prognosis: Good    Condition at Discharge: Stable    Rehab Potential (if transferring to Rehab): Good    Recommended Labs or Other Treatments After Discharge: PT    Physician Certification: I certify the above information and transfer of Dorothy Umanzor  is necessary for the continuing treatment of the diagnosis listed and that he requires [address] for less 30 days.      Update Admission H&P: No change in H&P    PHYSICIAN SIGNATURE:  Electronically signed by Audra Encarnacion MD on 8/22/19 at 11:52 AM    Current Discharge Medication List     START taking these medications     Medication Dose   polyethylene glycol (GLYCOLAX) packet 17 g   Take 17 g by mouth daily   Quantity: 527 g Refills: 1       levothyroxine (SYNTHROID) 50 MCG tablet 50 mcg   Take 1 tablet by mouth Daily   Quantity: 30 tablet Refills: 2       doxycycline hyclate (VIBRA-TABS) 100 MG tablet 100 mg   Take 1 tablet by mouth 2 times daily for 7 days   Quantity: 14 tablet Refills: 0           CONTINUE these medications which have CHANGED or have new prescriptions     Medication Dose   docusate (COLACE, DULCOLAX) 100 MG CAPS 200 mg   Take 200 mg by mouth 2 times daily   Quantity: 120 capsule Refills: 0           CONTINUE these medications which have NOT CHANGED     Medication Dose   simvastatin (ZOCOR) 20 MG tablet 20 mg   Take 20 mg by mouth nightly       aspirin 81 MG EC tablet 81 mg   Take 81 mg by mouth daily       carbidopa-levodopa (SINEMET)  MG per tablet 1 tablet   Take 1 tablet by mouth 3 times daily

## 2019-08-23 NOTE — DISCHARGE SUMMARY
by mouth daily        CHANGE how you take these medications    glimepiride 2 MG tablet  Commonly known as:  AMARYL  Take 1 tablet by mouth every morning (before breakfast)  What changed:  Another medication with the same name was removed. Continue taking this medication, and follow the directions you see here. CONTINUE taking these medications    aspirin 81 MG EC tablet     carbidopa-levodopa  MG per tablet  Commonly known as:  SINEMET     CENTRUM SILVER PO     docusate 100 MG Caps  Commonly known as:  COLACE, DULCOLAX  Take 200 mg by mouth 2 times daily     folic acid 1 MG tablet  Commonly known as:  FOLVITE     lisinopril 5 MG tablet  Commonly known as:  PRINIVIL;ZESTRIL     methotrexate 2.5 MG chemo tablet  Commonly known as:  RHEUMATREX     simvastatin 20 MG tablet  Commonly known as:  ZOCOR           Where to Get Your Medications      These medications were sent to [address] [address]    Phone:  [phone]   · docusate 100 MG Caps  · levothyroxine 50 MCG tablet  · polyethylene glycol packet     You can get these medications from any pharmacy    Bring a paper prescription for each of these medications  · doxycycline hyclate 100 MG tablet           Time spent on discharge planning ;          [] less than 30 minutes . [x]   more  than 30 minutes . Ellectronically signed by   Viktoria Narvaez MD      Thank you Dr. Vladimir Merino MD for the opportunity to be involved in this patient's care. Please note that this chart was generated using voice recognition Dragon dictation software. Although every effort was made to ensure the accuracy of this automated transcription, some errors in transcription may have occurred.   Attending Physician Statement  I have reviewed and edited the discharge summary of  Sravan Newsome AS NEEDED  ,   including pertinent history and exam

## 2019-08-23 NOTE — PROGRESS NOTES
Type II or unspecified type diabetes mellitus without mention of complication, not stated as uncontrolled, and Wears glasses. PAST SURGICAL HISTORY       has a past surgical history that includes Appendectomy ('70's); Cholecystectomy (1981); Tongue Biopsy (11/2014); bronchoscopy; Colonoscopy; Clavicle surgery (Right, 1949); Tonsillectomy; Gastrostomy tube placement (11/25/2014); Umbilical hernia repair; Tunneled venous port placement (Left, 11/17/14); Cystocopy (03/23/2018); TURP (03/23/2018); pr office/outpt visit,procedure only (N/A, 3/23/2018); Endoscopy, colon, diagnostic; Cystocopy (02/22/2019); Cystoscopy (N/A, 2/22/2019); TURP (05/01/2019); and TURP (N/A, 5/1/2019). HOME MEDICATIONS        Prior to Admission medications    Medication Sig Start Date End Date Taking? Authorizing Provider   simvastatin (ZOCOR) 20 MG tablet Take 20 mg by mouth nightly   Yes Historical Provider, MD   aspirin 81 MG EC tablet Take 81 mg by mouth daily   Yes Historical Provider, MD   carbidopa-levodopa (SINEMET)  MG per tablet Take 1 tablet by mouth 3 times daily   Yes Historical Provider, MD   glimepiride (AMARYL) 2 MG tablet Take 1 tablet by mouth every morning (before breakfast) 4/24/19 8/20/19 Yes Linda Paul MD   Multiple Vitamins-Minerals (CENTRUM SILVER PO) Take 1 tablet by mouth daily   Yes Historical Provider, MD   methotrexate (RHEUMATREX) 2.5 MG chemo tablet Take 15 mg by mouth once a week Indications: Saturday 6 tablets 4/14/18  Yes Historical Provider, MD   folic acid (FOLVITE) 1 MG tablet Take 1 mg by mouth Daily with supper   Yes Historical Provider, MD   lisinopril (PRINIVIL;ZESTRIL) 5 MG tablet Take 5 mg by mouth Daily with supper    Yes Historical Provider, MD       ALLERGIES      Patient has no known allergies. SOCIAL HISTORY       reports that he quit smoking about 27 years ago. His smoking use included cigarettes. He started smoking about 71 years ago. He smoked 2.00 packs per day.  He has

## 2019-08-23 NOTE — PLAN OF CARE
Problem: Falls - Risk of:  Goal: Will remain free from falls  Description  Will remain free from falls  8/23/2019 0341 by Daniel Saldaña RN  Outcome: Met This Shift  8/22/2019 1657 by Seferino Holly RN  Outcome: Ongoing  Note:   No falls this shift. Call light with in reach, side rails up x2, bed in lowest postion. Patients safety maintained. Goal: Absence of physical injury  Description  Absence of physical injury  8/23/2019 0341 by Daniel Saldaña RN  Outcome: Met This Shift  8/22/2019 1657 by Seferino Holly RN  Outcome: Ongoing  Note:   No injury this shift. Patients safety maintained.       Problem: Physical Regulation:  Goal: Will remain free from infection  Description  Will remain free from infection  8/23/2019 0341 by Daniel Saldaña RN  Outcome: Met This Shift  8/22/2019 1657 by Seferino Holly RN  Outcome: Ongoing     Problem: Musculor/Skeletal Functional Status  Goal: Highest potential functional level  8/23/2019 0341 by Daniel Saldaña RN  Outcome: Ongoing  8/22/2019 1657 by Seferino Holly RN  Outcome: Ongoing  Goal: Absence of falls  8/23/2019 0341 by Daniel Saldaña RN  Outcome: Ongoing  8/22/2019 1657 by Seferino Holly RN  Outcome: Ongoing     Problem: Nutrition  Goal: Optimal nutrition therapy  8/23/2019 0341 by Daniel Saldaña RN  Outcome: Ongoing  8/22/2019 1657 by Seferino Holly RN  Outcome: Ongoing  8/22/2019 1529 by Pamela Quinonez RD, LD  Outcome: Ongoing     Problem: Pain:  Goal: Pain level will decrease  Description  Pain level will decrease  Outcome: Met This Shift  Goal: Control of acute pain  Description  Control of acute pain  Outcome: Met This Shift  Goal: Control of chronic pain  Description  Control of chronic pain  Outcome: Met This Shift

## 2019-08-24 ENCOUNTER — CARE COORDINATION (OUTPATIENT)
Dept: CASE MANAGEMENT | Age: 84
End: 2019-08-24

## 2019-08-24 DIAGNOSIS — E11.8 TYPE 2 DIABETES MELLITUS WITH COMPLICATION, WITHOUT LONG-TERM CURRENT USE OF INSULIN (HCC): Primary | ICD-10-CM

## 2019-08-26 ENCOUNTER — PROCEDURE VISIT (OUTPATIENT)
Dept: UROLOGY | Age: 84
End: 2019-08-26
Payer: MEDICARE

## 2019-08-26 VITALS
BODY MASS INDEX: 22.66 KG/M2 | SYSTOLIC BLOOD PRESSURE: 170 MMHG | DIASTOLIC BLOOD PRESSURE: 82 MMHG | HEIGHT: 73 IN | TEMPERATURE: 98.3 F | WEIGHT: 171 LBS | HEART RATE: 69 BPM

## 2019-08-26 DIAGNOSIS — C67.1 MALIGNANT NEOPLASM OF DOME OF URINARY BLADDER (HCC): ICD-10-CM

## 2019-08-26 DIAGNOSIS — R33.9 INCOMPLETE BLADDER EMPTYING: Primary | ICD-10-CM

## 2019-08-26 DIAGNOSIS — R31.0 GROSS HEMATURIA: ICD-10-CM

## 2019-08-26 PROCEDURE — 52000 CYSTOURETHROSCOPY: CPT | Performed by: UROLOGY

## 2019-08-27 ENCOUNTER — CARE COORDINATION (OUTPATIENT)
Dept: CARE COORDINATION | Age: 84
End: 2019-08-27

## 2019-08-27 ENCOUNTER — OFFICE VISIT (OUTPATIENT)
Dept: INTERNAL MEDICINE CLINIC | Age: 84
End: 2019-08-27
Payer: MEDICARE

## 2019-08-27 VITALS
HEART RATE: 68 BPM | BODY MASS INDEX: 22.8 KG/M2 | DIASTOLIC BLOOD PRESSURE: 68 MMHG | OXYGEN SATURATION: 96 % | HEIGHT: 73 IN | WEIGHT: 172 LBS | SYSTOLIC BLOOD PRESSURE: 138 MMHG

## 2019-08-27 DIAGNOSIS — E03.9 HYPOTHYROIDISM, UNSPECIFIED TYPE: ICD-10-CM

## 2019-08-27 DIAGNOSIS — E11.40 TYPE 2 DIABETES MELLITUS WITH DIABETIC NEUROPATHY, WITHOUT LONG-TERM CURRENT USE OF INSULIN (HCC): Primary | ICD-10-CM

## 2019-08-27 DIAGNOSIS — I50.42 CHRONIC COMBINED SYSTOLIC AND DIASTOLIC CONGESTIVE HEART FAILURE (HCC): ICD-10-CM

## 2019-08-27 DIAGNOSIS — K59.00 CONSTIPATION, UNSPECIFIED CONSTIPATION TYPE: ICD-10-CM

## 2019-08-27 DIAGNOSIS — R42 DIZZINESS: ICD-10-CM

## 2019-08-27 DIAGNOSIS — C67.9 MALIGNANT NEOPLASM OF URINARY BLADDER, UNSPECIFIED SITE (HCC): ICD-10-CM

## 2019-08-27 PROCEDURE — 1111F DSCHRG MED/CURRENT MED MERGE: CPT | Performed by: INTERNAL MEDICINE

## 2019-08-27 PROCEDURE — 99495 TRANSJ CARE MGMT MOD F2F 14D: CPT | Performed by: INTERNAL MEDICINE

## 2019-08-27 RX ORDER — LEVOTHYROXINE SODIUM 0.03 MG/1
25 TABLET ORAL DAILY
Qty: 30 TABLET | Refills: 2 | Status: SHIPPED | OUTPATIENT
Start: 2019-08-27

## 2019-08-27 ASSESSMENT — ENCOUNTER SYMPTOMS
BACK PAIN: 0
SHORTNESS OF BREATH: 0
ABDOMINAL DISTENTION: 0
EYE REDNESS: 0
CHEST TIGHTNESS: 0
BLOOD IN STOOL: 0
ABDOMINAL PAIN: 0
COUGH: 0
EYE PAIN: 0
EYE ITCHING: 0
EYE DISCHARGE: 0
COLOR CHANGE: 0
CONSTIPATION: 0
DIARRHEA: 0
APNEA: 0
CHOKING: 0

## 2019-08-27 NOTE — PROGRESS NOTES
Subjective:      Chief Complaint   Patient presents with    Follow-Up from Hospital     F/u from Professor Abdulaziz Chavez for UTI and dehydration        Patient ID: Ariel Portillo is a 80 y.o. male. Visit Information    Have you changed or started any medications since your last visit including any over-the-counter medicines, vitamins, or herbal medicines? no   Are you having any side effects from any of your medications? -  no  Have you stopped taking any of your medications? Is so, why? -  no    Have you seen any other physician or provider since your last visit? Yes - Records Obtained  Have you had any other diagnostic tests since your last visit? Yes - Records Obtained  Have you been seen in the emergency room and/or had an admission to a hospital since we last saw you? Yes - Records Obtained  Have you had your routine dental cleaning in the past 6 months? no    Have you activated your [employer] account? If not, what are your barriers?  Yes     Patient Care Team:  Leeroy Castellanos MD as PCP - General (Internal Medicine)  Leeroy Castellanos MD as PCP - [facility]  Mihai Freedman MD as Consulting Physician (Hematology and Oncology)  Ariel Peters MD as Physician (Radiation Oncology)  Taryn Cary MD as Consulting Physician (Otolaryngology)  Cristian Whalen MD as Surgeon (Cardiology)  Keya Eastman RN as Care Transition    Medical History Review  Past Medical, Family, and Social History reviewed and does not contribute to the patient presenting condition    Health Maintenance   Topic Date Due    DTaP/Tdap/Td vaccine (1 - Tdap) 12/11/1951    Shingles Vaccine (1 of 2) 12/11/1982    Flu vaccine (1) 09/01/2019    Annual Wellness Visit (AWV)  08/13/2020    Pneumococcal 65+ years Vaccine (2 of 2 - PPSV23) 08/14/2020    TSH testing  08/21/2020    Potassium monitoring  08/23/2020    Creatinine monitoring  08/23/2020     HPI-     Review of Systems    Objective:   Physical Exam    Assessment / Plan:

## 2019-08-27 NOTE — PATIENT INSTRUCTIONS
Patient Education        Preventing Falls: Care Instructions  Your Care Instructions    Getting around your home safely can be a challenge if you have injuries or health problems that make it easy for you to fall. Loose rugs and furniture in walkways are among the dangers for many older people who have problems walking or who have poor eyesight. People who have conditions such as arthritis, osteoporosis, or dementia also have to be careful not to fall. You can make your home safer with a few simple measures. Follow-up care is a key part of your treatment and safety. Be sure to make and go to all appointments, and call your doctor if you are having problems. It's also a good idea to know your test results and keep a list of the medicines you take. How can you care for yourself at home? Taking care of yourself  · You may get dizzy if you do not drink enough water. To prevent dehydration, drink plenty of fluids, enough so that your urine is light yellow or clear like water. Choose water and other caffeine-free clear liquids. If you have kidney, heart, or liver disease and have to limit fluids, talk with your doctor before you increase the amount of fluids you drink. · Exercise regularly to improve your strength, muscle tone, and balance. Walk if you can. Swimming may be a good choice if you cannot walk easily. · Have your vision and hearing checked each year or any time you notice a change. If you have trouble seeing and hearing, you might not be able to avoid objects and could lose your balance. · Know the side effects of the medicines you take. Ask your doctor or pharmacist whether the medicines you take can affect your balance. Sleeping pills or sedatives can affect your balance. · Limit the amount of alcohol you drink. Alcohol can impair your balance and other senses. · Ask your doctor whether calluses or corns on your feet need to be removed.  If you wear loose-fitting shoes because of calluses or corns, surface, use grippers that can be worn over your shoes in bad weather. · Be extra careful if weather is bad. Walk on the grass when the sidewalks are slick. If you live in a place that gets snow and ice in the winter, sprinkle salt on slippery stairs and sidewalks. · Be careful getting on or off buses and trains or getting in and out of cars. If handrails are available, use them. · Be careful when you cross the street. Look for crosswalks or places where curb cuts or ramps are present. · Try not to hurry, especially if you are carrying something. · Be cautious in parking lots or garages. There may be curbs or changes in pavement, or the height of the pavement may vary. · Make sure to wear the correct eyeglasses, if you need them. Reading glasses or bifocals can make it harder to see hazards that might be in your way. · If you are walking outdoors for exercise, try to:  ? Walk in well-lighted, well-maintained areas. These include high school or college tracks, shopping malls, and public spaces. ? Walk with a partner. ? Watch out for cracked sidewalks, curbs, changes in the height of the pavement, exposed tree roots, and debris such as fallen leaves or branches. Where can you learn more? Go to https://PowerDsineloganeb.365 docobites. org and sign in to your [employer] account. Enter I599 in the [employer] box to learn more about \"Preventing Outdoor Falls: Care Instructions. \"     If you do not have an account, please click on the \"Sign Up Now\" link. Current as of: November 7, 2018  Content Version: 12.1  © 1565-3276 [employer]. Care instructions adapted under license by [facility] [facility] If you have questions about a medical condition or this instruction, always ask your healthcare professional. [address] any warranty or liability for your use of this information.          Patient Education        13 Ways to Prevent Falls in the Hospital  When you're in the hospital,

## 2019-08-27 NOTE — PROGRESS NOTES
Post-Discharge Transitional Care Management Services or Hospital Follow Up      Andrzej Baker   YOB: 1932    Date of Office Visit:  8/27/2019  Date of Hospital Admission: 8/20/19  Date of Hospital Discharge: 8/23/19  Readmission Risk Score(high >=14%.  Medium >=10%):Readmission Risk Score: 21      Care management risk score Rising risk (score 2-5) and Complex Care (Scores >=6): 10     Non face to face  following discharge, date last encounter closed (first attempt may have been earlier): 8/24/2019 11:39 AM 8/24/2019 11:39 AM    Call initiated 2 business days of discharge: Yes     Patient Active Problem List   Diagnosis    Type 2 diabetes mellitus with complication ([address]    Diabetic polyneuropathy ([address]    Disorder of arteries and arterioles ([address]    Memory loss    Tongue cancer ([address]    Anorexia    Weight loss    Anxiety    Rash    Oral mucositis    Bradycardia    Chronic combined systolic and diastolic congestive heart failure (HCC)    History of tobacco abuse    Rheumatoid arteritis    Slow transit constipation    Orthostatic hypotension    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)    Right inguinal hernia    Bladder cancer (HCC)    Hematuria due to cystitis    Severe malnutrition (HCC)    Pre-syncope    Acute cystitis with hematuria    Hypothyroid       No Known Allergies    Medications listed as ordered at the time of discharge from [facility], [address] [address] [facility] Medication Instructions MATEO:    Printed on:08/27/19 3272   Medication Information                      aspirin 81 MG EC tablet  Take 81 mg by mouth daily             carbidopa-levodopa (SINEMET)  MG per tablet  Take 1 tablet by mouth 3 times daily             docusate (COLACE, DULCOLAX) 100 MG CAPS  Take 200 mg by mouth 2 times daily             doxycycline hyclate (VIBRA-TABS) 100 MG tablet  Take 1 tablet by mouth 2 times daily for 7 days             folic acid (FOLVITE) 1 MG tablet  Take SpO2: 96%      Weight: 172 lb (78 kg)      Height: 6' 0.99\" (1.854 m)        Body mass index is 22.7 kg/m². Wt Readings from Last 3 Encounters:   08/27/19 172 lb (78 kg)   08/26/19 171 lb (77.6 kg)   08/21/19 171 lb 11.8 oz (77.9 kg)     BP Readings from Last 3 Encounters:   08/27/19 138/68   08/26/19 (!) 170/82   08/23/19 (!) 160/61       Physical Exam   Constitutional: He is oriented to person, place, and time. He appears well-developed and well-nourished. HENT:   Head: Normocephalic and atraumatic. Mouth/Throat: No oropharyngeal exudate. Eyes: Pupils are equal, round, and reactive to light. Conjunctivae are normal. Right eye exhibits no discharge. Left eye exhibits no discharge. No scleral icterus. Neck: Normal range of motion. Neck supple. No JVD present. No tracheal deviation present. No thyromegaly present. Cardiovascular: Normal rate and normal heart sounds. Exam reveals no gallop. No murmur heard. Pulmonary/Chest: Effort normal and breath sounds normal. No stridor. No respiratory distress. He has no wheezes. He has no rales. He exhibits no tenderness. Abdominal: Soft. Bowel sounds are normal. He exhibits no distension. There is no tenderness. There is no rebound and no guarding. Musculoskeletal: Normal range of motion. He exhibits no edema. Has Gait Problems    Neurological: He is alert and oriented to person, place, and time. Skin: He is not diaphoretic. Assessment/Plan:  1. Type 2 diabetes mellitus with diabetic neuropathy, without long-term current use of insulin ([facility])  Controlled     2. Constipation, unspecified constipation type  Controlled     3. Hypothyroidism, unspecified type  Has Slightly elevated TSH , will Reduce dose to 25 mcg and Repeat TSH in 2 months   - levothyroxine (SYNTHROID) 25 MCG tablet; Take 1 tablet by mouth Daily  Dispense: 30 tablet; Refill: 2  - TSH; Future    4.  Malignant neoplasm of urinary bladder, unspecified site [facility])  S/p Recent Cystoscopy Has Chronic Gruber Catheter   5. Chronic combined systolic and diastolic congestive heart failure (HCC)  Compensated     6.  Dizziness  Negative for Ortho stats in Office   - Compression Stocking      Requested [facility] to Talk to Patient about needs     Medical Decision Making: moderate complexity

## 2019-08-27 NOTE — CARE COORDINATION
CC met with patient and his wife at PCP request to discuss fall safety and risks. Per wife they do not have any loose rugs, tubing or trip hazards in their home. Patient only takes sponge baths due to the fear of falling in the tub. Refused any shower chair or equipment. Per wife they have grab bars installed all over their home. Patient admits to having a walker and a cane at home but due to pride he does not use them. CC discussed the potential of falling and causing injury that would require a SNF stay. Patient understood and agreed to start using his cane daily. CC provided wife and patient with contact number for future needs.

## 2019-08-28 ENCOUNTER — CARE COORDINATION (OUTPATIENT)
Dept: CASE MANAGEMENT | Age: 84
End: 2019-08-28

## 2019-08-28 ENCOUNTER — HOSPITAL ENCOUNTER (OUTPATIENT)
Age: 84
Setting detail: SPECIMEN
Discharge: HOME | End: 2019-08-28
Payer: MEDICARE

## 2019-08-28 DIAGNOSIS — E03.9 HYPOTHYROIDISM, UNSPECIFIED TYPE: ICD-10-CM

## 2019-08-28 LAB — TSH SERPL DL<=0.05 MIU/L-ACNC: 4.78 MIU/L (ref 0.3–5)

## 2019-09-05 ENCOUNTER — CARE COORDINATION (OUTPATIENT)
Dept: CASE MANAGEMENT | Age: 84
End: 2019-09-05

## 2019-09-05 DIAGNOSIS — E11.42 TYPE 2 DIABETES MELLITUS WITH DIABETIC POLYNEUROPATHY, WITH LONG-TERM CURRENT USE OF INSULIN (HCC): ICD-10-CM

## 2019-09-05 DIAGNOSIS — E11.9 INSULIN DEPENDENT TYPE 2 DIABETES MELLITUS, CONTROLLED (HCC): ICD-10-CM

## 2019-09-05 DIAGNOSIS — Z79.4 TYPE 2 DIABETES MELLITUS WITH DIABETIC POLYNEUROPATHY, WITH LONG-TERM CURRENT USE OF INSULIN (HCC): ICD-10-CM

## 2019-09-05 DIAGNOSIS — Z79.4 INSULIN DEPENDENT TYPE 2 DIABETES MELLITUS, CONTROLLED (HCC): ICD-10-CM

## 2019-09-05 RX ORDER — CALCIUM CITRATE/VITAMIN D3 200MG-6.25
TABLET ORAL
Qty: 100 STRIP | Refills: 3 | Status: ON HOLD | OUTPATIENT
Start: 2019-09-05 | End: 2019-12-15 | Stop reason: HOSPADM

## 2019-09-05 NOTE — CARE COORDINATION
[facility] Transitions Follow Up Call    2019    Patient: Mary Ellen Del Castillo  Patient : 1932   MRN: [id number]  Reason for Admission: snycope  Discharge Date: 19 RARS: Readmission Risk Score: 21         Spoke with: Sravan  Patient doing well, still has catheter in, goes on Monday to see Dr. Demi Wise, urine clear, no needs or concerns at this time, informed of final call , patient v/u, care transitions completed//JU    Care Transitions Subsequent and Final Call    Subsequent and Final Calls  Are you currently active with any services?:  [address] you have any needs or concerns that I can assist you with?:  No  Identified Barriers:  None  Care Transitions Interventions  Other Interventions:             Follow Up  Future Appointments   Date Time Provider Feng Thomas   2019 10:50 AM Tiarra Bond MD [place] [facility]   10/28/2019  9:45 AM Fantasma Flores MD [address]   10/28/2019 11:40 AM Desi Alexander MD [address] [facility] [place]uro [place], MAX

## 2019-09-09 ENCOUNTER — OFFICE VISIT (OUTPATIENT)
Dept: UROLOGY | Age: 84
End: 2019-09-09
Payer: MEDICARE

## 2019-09-09 VITALS
DIASTOLIC BLOOD PRESSURE: 91 MMHG | HEART RATE: 67 BPM | TEMPERATURE: 97.7 F | SYSTOLIC BLOOD PRESSURE: 169 MMHG | BODY MASS INDEX: 22.48 KG/M2 | HEIGHT: 73 IN | WEIGHT: 169.6 LBS

## 2019-09-09 DIAGNOSIS — R31.0 GROSS HEMATURIA: ICD-10-CM

## 2019-09-09 DIAGNOSIS — C67.1 MALIGNANT NEOPLASM OF DOME OF URINARY BLADDER (HCC): Primary | ICD-10-CM

## 2019-09-09 DIAGNOSIS — R33.9 URINARY RETENTION: ICD-10-CM

## 2019-09-09 PROCEDURE — G8420 CALC BMI NORM PARAMETERS: HCPCS | Performed by: UROLOGY

## 2019-09-09 PROCEDURE — 1111F DSCHRG MED/CURRENT MED MERGE: CPT | Performed by: UROLOGY

## 2019-09-09 PROCEDURE — 99214 OFFICE O/P EST MOD 30 MIN: CPT | Performed by: UROLOGY

## 2019-09-09 PROCEDURE — G8427 DOCREV CUR MEDS BY ELIG CLIN: HCPCS | Performed by: UROLOGY

## 2019-09-09 PROCEDURE — 1036F TOBACCO NON-USER: CPT | Performed by: UROLOGY

## 2019-09-09 PROCEDURE — 4040F PNEUMOC VAC/ADMIN/RCVD: CPT | Performed by: UROLOGY

## 2019-09-09 PROCEDURE — 1123F ACP DISCUSS/DSCN MKR DOCD: CPT | Performed by: UROLOGY

## 2019-09-09 ASSESSMENT — ENCOUNTER SYMPTOMS
WHEEZING: 0
EYE REDNESS: 0
DIARRHEA: 0
CONSTIPATION: 0
EYE PAIN: 0
SHORTNESS OF BREATH: 0
VOMITING: 0
BACK PAIN: 0
NAUSEA: 0
ABDOMINAL PAIN: 0
COUGH: 0

## 2019-09-16 ENCOUNTER — TELEPHONE (OUTPATIENT)
Dept: INTERNAL MEDICINE CLINIC | Age: 84
End: 2019-09-16

## 2019-09-18 ENCOUNTER — PROCEDURE VISIT (OUTPATIENT)
Dept: UROLOGY | Age: 84
End: 2019-09-18
Payer: MEDICARE

## 2019-09-18 VITALS
WEIGHT: 168 LBS | DIASTOLIC BLOOD PRESSURE: 80 MMHG | HEART RATE: 68 BPM | BODY MASS INDEX: 22.26 KG/M2 | HEIGHT: 73 IN | TEMPERATURE: 98 F | SYSTOLIC BLOOD PRESSURE: 156 MMHG

## 2019-09-18 DIAGNOSIS — R33.9 URINARY RETENTION: Primary | ICD-10-CM

## 2019-09-18 PROCEDURE — 51702 INSERT TEMP BLADDER CATH: CPT | Performed by: NURSE PRACTITIONER

## 2019-09-20 ENCOUNTER — TELEPHONE (OUTPATIENT)
Dept: UROLOGY | Age: 84
End: 2019-09-20

## 2019-09-27 ENCOUNTER — APPOINTMENT (OUTPATIENT)
Dept: GENERAL RADIOLOGY | Age: 84
End: 2019-09-27
Payer: MEDICARE

## 2019-09-27 ENCOUNTER — HOSPITAL ENCOUNTER (OUTPATIENT)
Dept: PREADMISSION TESTING | Age: 84
Discharge: HOME | End: 2019-10-01
Payer: MEDICARE

## 2019-09-27 ENCOUNTER — HOSPITAL ENCOUNTER (EMERGENCY)
Age: 84
Discharge: HOME | End: 2019-09-27
Attending: EMERGENCY MEDICINE
Payer: MEDICARE

## 2019-09-27 VITALS
HEIGHT: 73 IN | SYSTOLIC BLOOD PRESSURE: 208 MMHG | DIASTOLIC BLOOD PRESSURE: 87 MMHG | TEMPERATURE: 97.4 F | RESPIRATION RATE: 16 BRPM | WEIGHT: 167.5 LBS | HEART RATE: 66 BPM | BODY MASS INDEX: 22.2 KG/M2 | OXYGEN SATURATION: 96 %

## 2019-09-27 VITALS
WEIGHT: 167.5 LBS | TEMPERATURE: 97.7 F | RESPIRATION RATE: 15 BRPM | DIASTOLIC BLOOD PRESSURE: 85 MMHG | SYSTOLIC BLOOD PRESSURE: 206 MMHG | HEIGHT: 73 IN | BODY MASS INDEX: 22.2 KG/M2 | OXYGEN SATURATION: 99 % | HEART RATE: 59 BPM

## 2019-09-27 DIAGNOSIS — I10 HYPERTENSION, UNSPECIFIED TYPE: Primary | ICD-10-CM

## 2019-09-27 LAB
ABSOLUTE EOS #: 0.31 K/UL (ref 0–0.44)
ABSOLUTE IMMATURE GRANULOCYTE: 0.05 K/UL (ref 0–0.3)
ABSOLUTE LYMPH #: 1.61 K/UL (ref 1.1–3.7)
ABSOLUTE MONO #: 0.93 K/UL (ref 0.1–1.2)
ALBUMIN SERPL-MCNC: 3.8 G/DL (ref 3.5–5.2)
ALBUMIN/GLOBULIN RATIO: 1.2 (ref 1–2.5)
ALP BLD-CCNC: 85 U/L (ref 40–129)
ALT SERPL-CCNC: 6 U/L (ref 5–41)
ANION GAP SERPL CALCULATED.3IONS-SCNC: 13 MMOL/L (ref 9–17)
AST SERPL-CCNC: 14 U/L
BASOPHILS # BLD: 1 % (ref 0–2)
BASOPHILS ABSOLUTE: 0.11 K/UL (ref 0–0.2)
BILIRUB SERPL-MCNC: 1.03 MG/DL (ref 0.3–1.2)
BILIRUBIN DIRECT: 0.25 MG/DL
BILIRUBIN, INDIRECT: 0.78 MG/DL (ref 0–1)
BUN BLDV-MCNC: 20 MG/DL (ref 8–23)
BUN BLDV-MCNC: 20 MG/DL (ref 8–23)
BUN/CREAT BLD: ABNORMAL (ref 9–20)
CALCIUM SERPL-MCNC: 9.1 MG/DL (ref 8.6–10.4)
CHLORIDE BLD-SCNC: 99 MMOL/L (ref 98–107)
CO2: 24 MMOL/L (ref 20–31)
CREAT SERPL-MCNC: 0.7 MG/DL (ref 0.7–1.2)
CREAT SERPL-MCNC: 0.72 MG/DL (ref 0.7–1.2)
DIFFERENTIAL TYPE: ABNORMAL
EOSINOPHILS RELATIVE PERCENT: 3 % (ref 1–4)
GFR AFRICAN AMERICAN: >60 ML/MIN
GFR AFRICAN AMERICAN: >60 ML/MIN
GFR NON-AFRICAN AMERICAN: >60 ML/MIN
GFR NON-AFRICAN AMERICAN: >60 ML/MIN
GFR SERPL CREATININE-BSD FRML MDRD: ABNORMAL ML/MIN/{1.73_M2}
GFR SERPL CREATININE-BSD FRML MDRD: ABNORMAL ML/MIN/{1.73_M2}
GFR SERPL CREATININE-BSD FRML MDRD: NORMAL ML/MIN/{1.73_M2}
GFR SERPL CREATININE-BSD FRML MDRD: NORMAL ML/MIN/{1.73_M2}
GLOBULIN: NORMAL G/DL (ref 1.5–3.8)
GLUCOSE BLD-MCNC: 282 MG/DL (ref 70–99)
GLUCOSE BLD-MCNC: 295 MG/DL (ref 70–99)
HCT VFR BLD CALC: 41.9 % (ref 40.7–50.3)
HEMOGLOBIN: 12.6 G/DL (ref 13–17)
IMMATURE GRANULOCYTES: 0 %
LYMPHOCYTES # BLD: 13 % (ref 24–43)
MCH RBC QN AUTO: 28.4 PG (ref 25.2–33.5)
MCHC RBC AUTO-ENTMCNC: 30.1 G/DL (ref 28.4–34.8)
MCV RBC AUTO: 94.6 FL (ref 82.6–102.9)
MONOCYTES # BLD: 8 % (ref 3–12)
NRBC AUTOMATED: 0 PER 100 WBC
PDW BLD-RTO: 15.9 % (ref 11.8–14.4)
PLATELET # BLD: 268 K/UL (ref 138–453)
PLATELET ESTIMATE: ABNORMAL
PMV BLD AUTO: 12.3 FL (ref 8.1–13.5)
POTASSIUM SERPL-SCNC: 4.1 MMOL/L (ref 3.7–5.3)
RBC # BLD: 4.43 M/UL (ref 4.21–5.77)
RBC # BLD: ABNORMAL 10*6/UL
SEG NEUTROPHILS: 75 % (ref 36–65)
SEGMENTED NEUTROPHILS ABSOLUTE COUNT: 9.29 K/UL (ref 1.5–8.1)
SODIUM BLD-SCNC: 136 MMOL/L (ref 135–144)
THYROXINE, FREE: 1.04 NG/DL (ref 0.93–1.7)
TOTAL PROTEIN: 7 G/DL (ref 6.4–8.3)
TROPONIN INTERP: NORMAL
TROPONIN T: NORMAL NG/ML
TROPONIN, HIGH SENSITIVITY: 20 NG/L (ref 0–22)
TSH SERPL DL<=0.05 MIU/L-ACNC: 5.98 MIU/L (ref 0.3–5)
WBC # BLD: 12.3 K/UL (ref 3.5–11.3)
WBC # BLD: ABNORMAL 10*3/UL

## 2019-09-27 PROCEDURE — 82565 ASSAY OF CREATININE: CPT

## 2019-09-27 PROCEDURE — 87088 URINE BACTERIA CULTURE: CPT

## 2019-09-27 PROCEDURE — 93005 ELECTROCARDIOGRAM TRACING: CPT | Performed by: STUDENT IN AN ORGANIZED HEALTH CARE EDUCATION/TRAINING PROGRAM

## 2019-09-27 PROCEDURE — 80048 BASIC METABOLIC PNL TOTAL CA: CPT

## 2019-09-27 PROCEDURE — 85025 COMPLETE CBC W/AUTO DIFF WBC: CPT

## 2019-09-27 PROCEDURE — 36415 COLL VENOUS BLD VENIPUNCTURE: CPT

## 2019-09-27 PROCEDURE — 99284 EMERGENCY DEPT VISIT MOD MDM: CPT

## 2019-09-27 PROCEDURE — 71046 X-RAY EXAM CHEST 2 VIEWS: CPT

## 2019-09-27 PROCEDURE — 84443 ASSAY THYROID STIM HORMONE: CPT

## 2019-09-27 PROCEDURE — 84520 ASSAY OF UREA NITROGEN: CPT

## 2019-09-27 PROCEDURE — 93005 ELECTROCARDIOGRAM TRACING: CPT | Performed by: ANESTHESIOLOGY

## 2019-09-27 PROCEDURE — 82947 ASSAY GLUCOSE BLOOD QUANT: CPT

## 2019-09-27 PROCEDURE — 87086 URINE CULTURE/COLONY COUNT: CPT

## 2019-09-27 PROCEDURE — 84484 ASSAY OF TROPONIN QUANT: CPT

## 2019-09-27 PROCEDURE — 6370000000 HC RX 637 (ALT 250 FOR IP): Performed by: STUDENT IN AN ORGANIZED HEALTH CARE EDUCATION/TRAINING PROGRAM

## 2019-09-27 PROCEDURE — 80076 HEPATIC FUNCTION PANEL: CPT

## 2019-09-27 PROCEDURE — 84439 ASSAY OF FREE THYROXINE: CPT

## 2019-09-27 PROCEDURE — 86403 PARTICLE AGGLUT ANTBDY SCRN: CPT

## 2019-09-27 RX ORDER — SODIUM CHLORIDE, SODIUM LACTATE, POTASSIUM CHLORIDE, CALCIUM CHLORIDE 600; 310; 30; 20 MG/100ML; MG/100ML; MG/100ML; MG/100ML
1000 INJECTION, SOLUTION INTRAVENOUS CONTINUOUS
Status: CANCELLED | OUTPATIENT
Start: 2019-09-27

## 2019-09-27 RX ORDER — LISINOPRIL 10 MG/1
15 TABLET ORAL ONCE
Status: COMPLETED | OUTPATIENT
Start: 2019-09-27 | End: 2019-09-27

## 2019-09-27 RX ORDER — AMLODIPINE BESYLATE 5 MG/1
5 TABLET ORAL DAILY
Qty: 14 TABLET | Refills: 0 | Status: SHIPPED | OUTPATIENT
Start: 2019-09-27 | End: 2019-10-28 | Stop reason: ALTCHOICE

## 2019-09-27 RX ORDER — LISINOPRIL 20 MG/1
20 TABLET ORAL DAILY
Qty: 14 TABLET | Refills: 0 | Status: SHIPPED | OUTPATIENT
Start: 2019-09-27 | End: 2019-10-21

## 2019-09-27 RX ORDER — AMLODIPINE BESYLATE 10 MG/1
5 TABLET ORAL ONCE
Status: COMPLETED | OUTPATIENT
Start: 2019-09-27 | End: 2019-09-27

## 2019-09-27 RX ADMIN — LISINOPRIL 15 MG: 10 TABLET ORAL at 14:30

## 2019-09-27 RX ADMIN — AMLODIPINE BESYLATE 5 MG: 10 TABLET ORAL at 14:30

## 2019-09-27 ASSESSMENT — PAIN DESCRIPTION - LOCATION: LOCATION: GROIN

## 2019-09-27 ASSESSMENT — PAIN DESCRIPTION - FREQUENCY: FREQUENCY: INTERMITTENT

## 2019-09-27 ASSESSMENT — PAIN DESCRIPTION - DESCRIPTORS: DESCRIPTORS: ACHING

## 2019-09-27 ASSESSMENT — ENCOUNTER SYMPTOMS
VOMITING: 0
ABDOMINAL PAIN: 0
NAUSEA: 0
BACK PAIN: 0
SHORTNESS OF BREATH: 0
CONSTIPATION: 0
EYE ITCHING: 0
COUGH: 0
EYE REDNESS: 0
RHINORRHEA: 0
DIARRHEA: 0

## 2019-09-27 ASSESSMENT — PAIN - FUNCTIONAL ASSESSMENT: PAIN_FUNCTIONAL_ASSESSMENT: PREVENTS OR INTERFERES SOME ACTIVE ACTIVITIES AND ADLS

## 2019-09-27 ASSESSMENT — PAIN DESCRIPTION - ORIENTATION: ORIENTATION: RIGHT

## 2019-09-27 ASSESSMENT — PAIN DESCRIPTION - ONSET: ONSET: ON-GOING

## 2019-09-27 ASSESSMENT — PAIN DESCRIPTION - PAIN TYPE: TYPE: CHRONIC PAIN

## 2019-09-27 ASSESSMENT — PAIN SCALES - GENERAL: PAINLEVEL_OUTOF10: 4

## 2019-09-27 ASSESSMENT — PAIN DESCRIPTION - PROGRESSION: CLINICAL_PROGRESSION: GRADUALLY IMPROVING

## 2019-09-28 LAB
EKG ATRIAL RATE: 63 BPM
EKG ATRIAL RATE: 68 BPM
EKG P AXIS: 63 DEGREES
EKG P AXIS: 97 DEGREES
EKG P-R INTERVAL: 232 MS
EKG P-R INTERVAL: 252 MS
EKG Q-T INTERVAL: 410 MS
EKG Q-T INTERVAL: 424 MS
EKG QRS DURATION: 140 MS
EKG QRS DURATION: 152 MS
EKG QTC CALCULATION (BAZETT): 433 MS
EKG QTC CALCULATION (BAZETT): 435 MS
EKG R AXIS: -36 DEGREES
EKG R AXIS: 8 DEGREES
EKG T AXIS: 41 DEGREES
EKG T AXIS: 53 DEGREES
EKG VENTRICULAR RATE: 63 BPM
EKG VENTRICULAR RATE: 68 BPM

## 2019-09-29 LAB
CULTURE: ABNORMAL
Lab: ABNORMAL
SPECIMEN DESCRIPTION: ABNORMAL

## 2019-09-30 ENCOUNTER — TELEPHONE (OUTPATIENT)
Dept: INTERNAL MEDICINE CLINIC | Age: 84
End: 2019-09-30

## 2019-10-09 ENCOUNTER — ANESTHESIA (OUTPATIENT)
Dept: OPERATING ROOM | Age: 84
DRG: 669 | End: 2019-10-09
Payer: MEDICARE

## 2019-10-09 ENCOUNTER — ANESTHESIA EVENT (OUTPATIENT)
Dept: OPERATING ROOM | Age: 84
DRG: 669 | End: 2019-10-09
Payer: MEDICARE

## 2019-10-09 ENCOUNTER — HOSPITAL ENCOUNTER (INPATIENT)
Age: 84
LOS: 2 days | Discharge: HOME HEALTH | DRG: 669 | End: 2019-10-11
Attending: UROLOGY | Admitting: UROLOGY
Payer: MEDICARE

## 2019-10-09 VITALS
DIASTOLIC BLOOD PRESSURE: 67 MMHG | RESPIRATION RATE: 13 BRPM | TEMPERATURE: 94.4 F | OXYGEN SATURATION: 100 % | SYSTOLIC BLOOD PRESSURE: 147 MMHG

## 2019-10-09 LAB
ANION GAP SERPL CALCULATED.3IONS-SCNC: 11 MMOL/L (ref 9–17)
BUN BLDV-MCNC: 19 MG/DL (ref 8–23)
BUN/CREAT BLD: ABNORMAL (ref 9–20)
CALCIUM SERPL-MCNC: 8.8 MG/DL (ref 8.6–10.4)
CHLORIDE BLD-SCNC: 101 MMOL/L (ref 98–107)
CO2: 26 MMOL/L (ref 20–31)
CREAT SERPL-MCNC: 0.69 MG/DL (ref 0.7–1.2)
GFR AFRICAN AMERICAN: >60 ML/MIN
GFR NON-AFRICAN AMERICAN: >60 ML/MIN
GFR SERPL CREATININE-BSD FRML MDRD: ABNORMAL ML/MIN/{1.73_M2}
GFR SERPL CREATININE-BSD FRML MDRD: ABNORMAL ML/MIN/{1.73_M2}
GLUCOSE BLD-MCNC: 111 MG/DL (ref 75–110)
GLUCOSE BLD-MCNC: 140 MG/DL (ref 70–99)
GLUCOSE BLD-MCNC: 143 MG/DL (ref 75–110)
GLUCOSE BLD-MCNC: 145 MG/DL (ref 75–110)
HCT VFR BLD CALC: 38.1 % (ref 40.7–50.3)
HEMOGLOBIN: 11.9 G/DL (ref 13–17)
MCH RBC QN AUTO: 28.9 PG (ref 25.2–33.5)
MCHC RBC AUTO-ENTMCNC: 31.2 G/DL (ref 28.4–34.8)
MCV RBC AUTO: 92.5 FL (ref 82.6–102.9)
NRBC AUTOMATED: 0 PER 100 WBC
PDW BLD-RTO: 16 % (ref 11.8–14.4)
PLATELET # BLD: 255 K/UL (ref 138–453)
PMV BLD AUTO: 12.5 FL (ref 8.1–13.5)
POTASSIUM SERPL-SCNC: 4.3 MMOL/L (ref 3.7–5.3)
RBC # BLD: 4.12 M/UL (ref 4.21–5.77)
SODIUM BLD-SCNC: 138 MMOL/L (ref 135–144)
WBC # BLD: 9.4 K/UL (ref 3.5–11.3)

## 2019-10-09 PROCEDURE — 0TBB8ZZ EXCISION OF BLADDER, VIA NATURAL OR ARTIFICIAL OPENING ENDOSCOPIC: ICD-10-PCS | Performed by: UROLOGY

## 2019-10-09 PROCEDURE — 82947 ASSAY GLUCOSE BLOOD QUANT: CPT

## 2019-10-09 PROCEDURE — 7100000000 HC PACU RECOVERY - FIRST 15 MIN: Performed by: UROLOGY

## 2019-10-09 PROCEDURE — 3600000014 HC SURGERY LEVEL 4 ADDTL 15MIN: Performed by: UROLOGY

## 2019-10-09 PROCEDURE — 6360000002 HC RX W HCPCS: Performed by: SPECIALIST

## 2019-10-09 PROCEDURE — 88307 TISSUE EXAM BY PATHOLOGIST: CPT

## 2019-10-09 PROCEDURE — 85027 COMPLETE CBC AUTOMATED: CPT

## 2019-10-09 PROCEDURE — 6360000002 HC RX W HCPCS: Performed by: STUDENT IN AN ORGANIZED HEALTH CARE EDUCATION/TRAINING PROGRAM

## 2019-10-09 PROCEDURE — 2580000003 HC RX 258: Performed by: STUDENT IN AN ORGANIZED HEALTH CARE EDUCATION/TRAINING PROGRAM

## 2019-10-09 PROCEDURE — 80048 BASIC METABOLIC PNL TOTAL CA: CPT

## 2019-10-09 PROCEDURE — 1200000000 HC SEMI PRIVATE

## 2019-10-09 PROCEDURE — 6360000002 HC RX W HCPCS: Performed by: ANESTHESIOLOGY

## 2019-10-09 PROCEDURE — G0378 HOSPITAL OBSERVATION PER HR: HCPCS

## 2019-10-09 PROCEDURE — 87077 CULTURE AEROBIC IDENTIFY: CPT

## 2019-10-09 PROCEDURE — 6370000000 HC RX 637 (ALT 250 FOR IP): Performed by: STUDENT IN AN ORGANIZED HEALTH CARE EDUCATION/TRAINING PROGRAM

## 2019-10-09 PROCEDURE — 3700000001 HC ADD 15 MINUTES (ANESTHESIA): Performed by: UROLOGY

## 2019-10-09 PROCEDURE — 6370000000 HC RX 637 (ALT 250 FOR IP): Performed by: ANESTHESIOLOGY

## 2019-10-09 PROCEDURE — 2580000003 HC RX 258: Performed by: ANESTHESIOLOGY

## 2019-10-09 PROCEDURE — 2709999900 HC NON-CHARGEABLE SUPPLY: Performed by: UROLOGY

## 2019-10-09 PROCEDURE — 6360000002 HC RX W HCPCS: Performed by: PHYSICIAN ASSISTANT

## 2019-10-09 PROCEDURE — 3700000000 HC ANESTHESIA ATTENDED CARE: Performed by: UROLOGY

## 2019-10-09 PROCEDURE — 2580000003 HC RX 258: Performed by: UROLOGY

## 2019-10-09 PROCEDURE — 3600000004 HC SURGERY LEVEL 4 BASE: Performed by: UROLOGY

## 2019-10-09 PROCEDURE — 87186 SC STD MICRODIL/AGAR DIL: CPT

## 2019-10-09 PROCEDURE — 87086 URINE CULTURE/COLONY COUNT: CPT

## 2019-10-09 PROCEDURE — 2720000010 HC SURG SUPPLY STERILE: Performed by: UROLOGY

## 2019-10-09 PROCEDURE — 51700 IRRIGATION OF BLADDER: CPT

## 2019-10-09 PROCEDURE — 2500000003 HC RX 250 WO HCPCS: Performed by: SPECIALIST

## 2019-10-09 PROCEDURE — 7100000001 HC PACU RECOVERY - ADDTL 15 MIN: Performed by: UROLOGY

## 2019-10-09 RX ORDER — GENTAMICIN SULFATE 80 MG/50ML
80 INJECTION, SOLUTION INTRAVENOUS
Status: COMPLETED | OUTPATIENT
Start: 2019-10-09 | End: 2019-10-09

## 2019-10-09 RX ORDER — DOCUSATE SODIUM 100 MG/1
200 CAPSULE, LIQUID FILLED ORAL 2 TIMES DAILY
Status: DISCONTINUED | OUTPATIENT
Start: 2019-10-09 | End: 2019-10-11 | Stop reason: HOSPADM

## 2019-10-09 RX ORDER — FENTANYL CITRATE 50 UG/ML
25 INJECTION, SOLUTION INTRAMUSCULAR; INTRAVENOUS EVERY 5 MIN PRN
Status: COMPLETED | OUTPATIENT
Start: 2019-10-09 | End: 2019-10-09

## 2019-10-09 RX ORDER — MAGNESIUM HYDROXIDE 1200 MG/15ML
3000 LIQUID ORAL CONTINUOUS
Status: DISCONTINUED | OUTPATIENT
Start: 2019-10-09 | End: 2019-10-11 | Stop reason: HOSPADM

## 2019-10-09 RX ORDER — SIMVASTATIN 20 MG
20 TABLET ORAL NIGHTLY
Status: DISCONTINUED | OUTPATIENT
Start: 2019-10-09 | End: 2019-10-11 | Stop reason: HOSPADM

## 2019-10-09 RX ORDER — NICOTINE POLACRILEX 4 MG
15 LOZENGE BUCCAL PRN
Status: CANCELLED | OUTPATIENT
Start: 2019-10-09

## 2019-10-09 RX ORDER — PROPOFOL 10 MG/ML
INJECTION, EMULSION INTRAVENOUS PRN
Status: DISCONTINUED | OUTPATIENT
Start: 2019-10-09 | End: 2019-10-09 | Stop reason: SDUPTHER

## 2019-10-09 RX ORDER — AMLODIPINE BESYLATE 5 MG/1
5 TABLET ORAL ONCE
Status: COMPLETED | OUTPATIENT
Start: 2019-10-09 | End: 2019-10-09

## 2019-10-09 RX ORDER — SODIUM CHLORIDE 0.9 % (FLUSH) 0.9 %
10 SYRINGE (ML) INJECTION PRN
Status: DISCONTINUED | OUTPATIENT
Start: 2019-10-09 | End: 2019-10-11 | Stop reason: HOSPADM

## 2019-10-09 RX ORDER — AMLODIPINE BESYLATE 5 MG/1
5 TABLET ORAL DAILY
Status: DISCONTINUED | OUTPATIENT
Start: 2019-10-09 | End: 2019-10-11 | Stop reason: HOSPADM

## 2019-10-09 RX ORDER — SODIUM CHLORIDE, SODIUM LACTATE, POTASSIUM CHLORIDE, CALCIUM CHLORIDE 600; 310; 30; 20 MG/100ML; MG/100ML; MG/100ML; MG/100ML
1000 INJECTION, SOLUTION INTRAVENOUS CONTINUOUS
Status: DISCONTINUED | OUTPATIENT
Start: 2019-10-09 | End: 2019-10-09

## 2019-10-09 RX ORDER — HYDRALAZINE HYDROCHLORIDE 20 MG/ML
5 INJECTION INTRAMUSCULAR; INTRAVENOUS EVERY 10 MIN PRN
Status: DISCONTINUED | OUTPATIENT
Start: 2019-10-09 | End: 2019-10-09 | Stop reason: HOSPADM

## 2019-10-09 RX ORDER — FENTANYL CITRATE 50 UG/ML
INJECTION, SOLUTION INTRAMUSCULAR; INTRAVENOUS PRN
Status: DISCONTINUED | OUTPATIENT
Start: 2019-10-09 | End: 2019-10-09 | Stop reason: SDUPTHER

## 2019-10-09 RX ORDER — LIDOCAINE HYDROCHLORIDE 10 MG/ML
INJECTION, SOLUTION EPIDURAL; INFILTRATION; INTRACAUDAL; PERINEURAL PRN
Status: DISCONTINUED | OUTPATIENT
Start: 2019-10-09 | End: 2019-10-09 | Stop reason: SDUPTHER

## 2019-10-09 RX ORDER — FOLIC ACID 1 MG/1
1 TABLET ORAL
Status: DISCONTINUED | OUTPATIENT
Start: 2019-10-09 | End: 2019-10-11 | Stop reason: HOSPADM

## 2019-10-09 RX ORDER — HYDROCODONE BITARTRATE AND ACETAMINOPHEN 5; 325 MG/1; MG/1
2 TABLET ORAL EVERY 6 HOURS PRN
Status: DISCONTINUED | OUTPATIENT
Start: 2019-10-09 | End: 2019-10-11 | Stop reason: HOSPADM

## 2019-10-09 RX ORDER — ACETAMINOPHEN 325 MG/1
650 TABLET ORAL EVERY 6 HOURS PRN
Status: DISCONTINUED | OUTPATIENT
Start: 2019-10-09 | End: 2019-10-11 | Stop reason: HOSPADM

## 2019-10-09 RX ORDER — MAGNESIUM HYDROXIDE 1200 MG/15ML
LIQUID ORAL PRN
Status: DISCONTINUED | OUTPATIENT
Start: 2019-10-09 | End: 2019-10-09 | Stop reason: ALTCHOICE

## 2019-10-09 RX ORDER — DEXTROSE MONOHYDRATE 25 G/50ML
12.5 INJECTION, SOLUTION INTRAVENOUS PRN
Status: CANCELLED | OUTPATIENT
Start: 2019-10-09

## 2019-10-09 RX ORDER — LEVOTHYROXINE SODIUM 0.03 MG/1
25 TABLET ORAL DAILY
Status: DISCONTINUED | OUTPATIENT
Start: 2019-10-09 | End: 2019-10-11 | Stop reason: HOSPADM

## 2019-10-09 RX ORDER — DEXTROSE MONOHYDRATE 50 MG/ML
100 INJECTION, SOLUTION INTRAVENOUS PRN
Status: CANCELLED | OUTPATIENT
Start: 2019-10-09

## 2019-10-09 RX ORDER — ACETAMINOPHEN 500 MG
1000 TABLET ORAL ONCE
Status: COMPLETED | OUTPATIENT
Start: 2019-10-09 | End: 2019-10-09

## 2019-10-09 RX ORDER — SODIUM CHLORIDE 0.9 % (FLUSH) 0.9 %
10 SYRINGE (ML) INJECTION EVERY 12 HOURS SCHEDULED
Status: DISCONTINUED | OUTPATIENT
Start: 2019-10-09 | End: 2019-10-11 | Stop reason: HOSPADM

## 2019-10-09 RX ORDER — HYDROCODONE BITARTRATE AND ACETAMINOPHEN 5; 325 MG/1; MG/1
1 TABLET ORAL EVERY 6 HOURS PRN
Status: DISCONTINUED | OUTPATIENT
Start: 2019-10-09 | End: 2019-10-11 | Stop reason: HOSPADM

## 2019-10-09 RX ORDER — ONDANSETRON 2 MG/ML
4 INJECTION INTRAMUSCULAR; INTRAVENOUS EVERY 6 HOURS PRN
Status: DISCONTINUED | OUTPATIENT
Start: 2019-10-09 | End: 2019-10-11 | Stop reason: HOSPADM

## 2019-10-09 RX ORDER — GLIMEPIRIDE 2 MG/1
2 TABLET ORAL
Status: DISCONTINUED | OUTPATIENT
Start: 2019-10-10 | End: 2019-10-11 | Stop reason: HOSPADM

## 2019-10-09 RX ORDER — MAGNESIUM HYDROXIDE 1200 MG/15ML
LIQUID ORAL CONTINUOUS PRN
Status: COMPLETED | OUTPATIENT
Start: 2019-10-09 | End: 2019-10-09

## 2019-10-09 RX ADMIN — HYDRALAZINE HYDROCHLORIDE 5 MG: 20 INJECTION INTRAMUSCULAR; INTRAVENOUS at 15:29

## 2019-10-09 RX ADMIN — FENTANYL CITRATE 25 MCG: 50 INJECTION INTRAMUSCULAR; INTRAVENOUS at 13:03

## 2019-10-09 RX ADMIN — DOCUSATE SODIUM 200 MG: 100 CAPSULE, LIQUID FILLED ORAL at 21:04

## 2019-10-09 RX ADMIN — PROPOFOL 200 MG: 10 INJECTION, EMULSION INTRAVENOUS at 13:04

## 2019-10-09 RX ADMIN — FENTANYL CITRATE 25 MCG: 50 INJECTION INTRAMUSCULAR; INTRAVENOUS at 14:25

## 2019-10-09 RX ADMIN — ACETAMINOPHEN 1000 MG: 500 TABLET ORAL at 15:31

## 2019-10-09 RX ADMIN — PROPOFOL 110 MG: 10 INJECTION, EMULSION INTRAVENOUS at 13:23

## 2019-10-09 RX ADMIN — CARBIDOPA AND LEVODOPA 1 TABLET: 25; 100 TABLET ORAL at 21:04

## 2019-10-09 RX ADMIN — FENTANYL CITRATE 25 MCG: 50 INJECTION INTRAMUSCULAR; INTRAVENOUS at 14:15

## 2019-10-09 RX ADMIN — SODIUM CHLORIDE, POTASSIUM CHLORIDE, SODIUM LACTATE AND CALCIUM CHLORIDE 1000 ML: 600; 310; 30; 20 INJECTION, SOLUTION INTRAVENOUS at 12:30

## 2019-10-09 RX ADMIN — AMLODIPINE BESYLATE 5 MG: 5 TABLET ORAL at 15:35

## 2019-10-09 RX ADMIN — SIMVASTATIN 20 MG: 20 TABLET, FILM COATED ORAL at 21:04

## 2019-10-09 RX ADMIN — CARBIDOPA AND LEVODOPA 1 TABLET: 25; 100 TABLET ORAL at 18:37

## 2019-10-09 RX ADMIN — DEXTROSE MONOHYDRATE 2 G: 50 INJECTION, SOLUTION INTRAVENOUS at 21:02

## 2019-10-09 RX ADMIN — FENTANYL CITRATE 25 MCG: 50 INJECTION INTRAMUSCULAR; INTRAVENOUS at 14:20

## 2019-10-09 RX ADMIN — HYDRALAZINE HYDROCHLORIDE 5 MG: 20 INJECTION INTRAMUSCULAR; INTRAVENOUS at 14:41

## 2019-10-09 RX ADMIN — SODIUM CHLORIDE 3000 ML: 900 IRRIGANT IRRIGATION at 21:03

## 2019-10-09 RX ADMIN — LEVOTHYROXINE SODIUM 25 MCG: 25 TABLET ORAL at 18:37

## 2019-10-09 RX ADMIN — FENTANYL CITRATE 25 MCG: 50 INJECTION INTRAMUSCULAR; INTRAVENOUS at 12:48

## 2019-10-09 RX ADMIN — FENTANYL CITRATE 25 MCG: 50 INJECTION INTRAMUSCULAR; INTRAVENOUS at 14:35

## 2019-10-09 RX ADMIN — PROPOFOL 200 MG: 10 INJECTION, EMULSION INTRAVENOUS at 12:48

## 2019-10-09 RX ADMIN — LIDOCAINE HYDROCHLORIDE 50 MG: 10 INJECTION, SOLUTION EPIDURAL; INFILTRATION; INTRACAUDAL; PERINEURAL at 12:48

## 2019-10-09 RX ADMIN — FOLIC ACID 1 MG: 1 TABLET ORAL at 18:37

## 2019-10-09 RX ADMIN — GENTAMICIN SULFATE 80 MG: 80 INJECTION, SOLUTION INTRAVENOUS at 13:03

## 2019-10-09 RX ADMIN — Medication 2 G: at 12:56

## 2019-10-09 ASSESSMENT — PULMONARY FUNCTION TESTS
PIF_VALUE: 0
PIF_VALUE: 1
PIF_VALUE: 0
PIF_VALUE: 1
PIF_VALUE: 0

## 2019-10-09 ASSESSMENT — PAIN DESCRIPTION - PAIN TYPE: TYPE: SURGICAL PAIN

## 2019-10-09 ASSESSMENT — PAIN DESCRIPTION - LOCATION: LOCATION: PENIS

## 2019-10-09 ASSESSMENT — PAIN SCALES - GENERAL
PAINLEVEL_OUTOF10: 0
PAINLEVEL_OUTOF10: 6
PAINLEVEL_OUTOF10: 7
PAINLEVEL_OUTOF10: 6

## 2019-10-09 ASSESSMENT — PAIN DESCRIPTION - DESCRIPTORS: DESCRIPTORS: CONSTANT;BURNING

## 2019-10-09 ASSESSMENT — ENCOUNTER SYMPTOMS
STRIDOR: 0
SHORTNESS OF BREATH: 0

## 2019-10-09 ASSESSMENT — PAIN - FUNCTIONAL ASSESSMENT: PAIN_FUNCTIONAL_ASSESSMENT: 0-10

## 2019-10-10 ENCOUNTER — APPOINTMENT (OUTPATIENT)
Dept: ULTRASOUND IMAGING | Age: 84
DRG: 669 | End: 2019-10-10
Attending: UROLOGY
Payer: MEDICARE

## 2019-10-10 ENCOUNTER — APPOINTMENT (OUTPATIENT)
Dept: GENERAL RADIOLOGY | Age: 84
DRG: 669 | End: 2019-10-10
Attending: UROLOGY
Payer: MEDICARE

## 2019-10-10 ENCOUNTER — APPOINTMENT (OUTPATIENT)
Dept: CT IMAGING | Age: 84
DRG: 669 | End: 2019-10-10
Attending: UROLOGY
Payer: MEDICARE

## 2019-10-10 LAB
ANION GAP SERPL CALCULATED.3IONS-SCNC: 10 MMOL/L (ref 9–17)
BUN BLDV-MCNC: 19 MG/DL (ref 8–23)
BUN/CREAT BLD: ABNORMAL (ref 9–20)
CALCIUM SERPL-MCNC: 8.5 MG/DL (ref 8.6–10.4)
CHLORIDE BLD-SCNC: 103 MMOL/L (ref 98–107)
CO2: 25 MMOL/L (ref 20–31)
CREAT SERPL-MCNC: 0.71 MG/DL (ref 0.7–1.2)
GFR AFRICAN AMERICAN: >60 ML/MIN
GFR NON-AFRICAN AMERICAN: >60 ML/MIN
GFR SERPL CREATININE-BSD FRML MDRD: ABNORMAL ML/MIN/{1.73_M2}
GFR SERPL CREATININE-BSD FRML MDRD: ABNORMAL ML/MIN/{1.73_M2}
GLUCOSE BLD-MCNC: 127 MG/DL (ref 70–99)
HCT VFR BLD CALC: 36.4 % (ref 40.7–50.3)
HEMOGLOBIN: 11.4 G/DL (ref 13–17)
MCH RBC QN AUTO: 29.2 PG (ref 25.2–33.5)
MCHC RBC AUTO-ENTMCNC: 31.3 G/DL (ref 28.4–34.8)
MCV RBC AUTO: 93.1 FL (ref 82.6–102.9)
NRBC AUTOMATED: 0 PER 100 WBC
PDW BLD-RTO: 16.5 % (ref 11.8–14.4)
PLATELET # BLD: 296 K/UL (ref 138–453)
PMV BLD AUTO: 12.1 FL (ref 8.1–13.5)
POTASSIUM SERPL-SCNC: 3.7 MMOL/L (ref 3.7–5.3)
RBC # BLD: 3.91 M/UL (ref 4.21–5.77)
SODIUM BLD-SCNC: 138 MMOL/L (ref 135–144)
SURGICAL PATHOLOGY REPORT: NORMAL
WBC # BLD: 11.9 K/UL (ref 3.5–11.3)

## 2019-10-10 PROCEDURE — 76770 US EXAM ABDO BACK WALL COMP: CPT

## 2019-10-10 PROCEDURE — 71045 X-RAY EXAM CHEST 1 VIEW: CPT

## 2019-10-10 PROCEDURE — G0378 HOSPITAL OBSERVATION PER HR: HCPCS

## 2019-10-10 PROCEDURE — 96365 THER/PROPH/DIAG IV INF INIT: CPT

## 2019-10-10 PROCEDURE — 6360000002 HC RX W HCPCS: Performed by: STUDENT IN AN ORGANIZED HEALTH CARE EDUCATION/TRAINING PROGRAM

## 2019-10-10 PROCEDURE — 36415 COLL VENOUS BLD VENIPUNCTURE: CPT

## 2019-10-10 PROCEDURE — 1200000000 HC SEMI PRIVATE

## 2019-10-10 PROCEDURE — 85027 COMPLETE CBC AUTOMATED: CPT

## 2019-10-10 PROCEDURE — 2580000003 HC RX 258: Performed by: STUDENT IN AN ORGANIZED HEALTH CARE EDUCATION/TRAINING PROGRAM

## 2019-10-10 PROCEDURE — 80048 BASIC METABOLIC PNL TOTAL CA: CPT

## 2019-10-10 PROCEDURE — 6370000000 HC RX 637 (ALT 250 FOR IP): Performed by: STUDENT IN AN ORGANIZED HEALTH CARE EDUCATION/TRAINING PROGRAM

## 2019-10-10 PROCEDURE — 51700 IRRIGATION OF BLADDER: CPT

## 2019-10-10 RX ORDER — CEPHALEXIN 500 MG/1
500 CAPSULE ORAL 2 TIMES DAILY
Qty: 4 CAPSULE | Refills: 0 | Status: SHIPPED | OUTPATIENT
Start: 2019-10-10 | End: 2019-10-12

## 2019-10-10 RX ORDER — ASPIRIN 81 MG/1
81 TABLET ORAL DAILY
Qty: 30 TABLET | Refills: 3 | Status: SHIPPED | OUTPATIENT
Start: 2019-10-10 | End: 2019-11-01 | Stop reason: ALTCHOICE

## 2019-10-10 RX ADMIN — CARBIDOPA AND LEVODOPA 1 TABLET: 25; 100 TABLET ORAL at 21:24

## 2019-10-10 RX ADMIN — CARBIDOPA AND LEVODOPA 1 TABLET: 25; 100 TABLET ORAL at 08:42

## 2019-10-10 RX ADMIN — AMLODIPINE BESYLATE 5 MG: 5 TABLET ORAL at 08:43

## 2019-10-10 RX ADMIN — LEVOTHYROXINE SODIUM 25 MCG: 25 TABLET ORAL at 06:08

## 2019-10-10 RX ADMIN — DOCUSATE SODIUM 200 MG: 100 CAPSULE, LIQUID FILLED ORAL at 08:42

## 2019-10-10 RX ADMIN — FOLIC ACID 1 MG: 1 TABLET ORAL at 21:24

## 2019-10-10 RX ADMIN — DEXTROSE MONOHYDRATE 2 G: 50 INJECTION, SOLUTION INTRAVENOUS at 05:32

## 2019-10-10 RX ADMIN — SIMVASTATIN 20 MG: 20 TABLET, FILM COATED ORAL at 21:24

## 2019-10-10 RX ADMIN — GLIMEPIRIDE 2 MG: 2 TABLET ORAL at 06:07

## 2019-10-10 RX ADMIN — DEXTROSE MONOHYDRATE 2 G: 50 INJECTION, SOLUTION INTRAVENOUS at 21:24

## 2019-10-10 RX ADMIN — CARBIDOPA AND LEVODOPA 1 TABLET: 25; 100 TABLET ORAL at 15:06

## 2019-10-10 RX ADMIN — Medication 10 ML: at 21:25

## 2019-10-10 RX ADMIN — DOCUSATE SODIUM 200 MG: 100 CAPSULE, LIQUID FILLED ORAL at 21:24

## 2019-10-10 RX ADMIN — DEXTROSE MONOHYDRATE 2 G: 50 INJECTION, SOLUTION INTRAVENOUS at 12:36

## 2019-10-10 ASSESSMENT — PAIN SCALES - GENERAL
PAINLEVEL_OUTOF10: 0
PAINLEVEL_OUTOF10: 0

## 2019-10-11 ENCOUNTER — APPOINTMENT (OUTPATIENT)
Dept: CT IMAGING | Age: 84
DRG: 669 | End: 2019-10-11
Attending: UROLOGY
Payer: MEDICARE

## 2019-10-11 VITALS
OXYGEN SATURATION: 95 % | WEIGHT: 168 LBS | SYSTOLIC BLOOD PRESSURE: 167 MMHG | TEMPERATURE: 97.7 F | DIASTOLIC BLOOD PRESSURE: 79 MMHG | HEIGHT: 73 IN | RESPIRATION RATE: 16 BRPM | BODY MASS INDEX: 22.26 KG/M2 | HEART RATE: 70 BPM

## 2019-10-11 LAB
ANION GAP SERPL CALCULATED.3IONS-SCNC: 11 MMOL/L (ref 9–17)
BUN BLDV-MCNC: 23 MG/DL (ref 8–23)
BUN/CREAT BLD: ABNORMAL (ref 9–20)
CALCIUM SERPL-MCNC: 8.6 MG/DL (ref 8.6–10.4)
CHLORIDE BLD-SCNC: 101 MMOL/L (ref 98–107)
CO2: 24 MMOL/L (ref 20–31)
CREAT SERPL-MCNC: 0.81 MG/DL (ref 0.7–1.2)
GFR AFRICAN AMERICAN: >60 ML/MIN
GFR NON-AFRICAN AMERICAN: >60 ML/MIN
GFR SERPL CREATININE-BSD FRML MDRD: ABNORMAL ML/MIN/{1.73_M2}
GFR SERPL CREATININE-BSD FRML MDRD: ABNORMAL ML/MIN/{1.73_M2}
GLUCOSE BLD-MCNC: 146 MG/DL (ref 70–99)
HCT VFR BLD CALC: 36 % (ref 40.7–50.3)
HEMOGLOBIN: 11 G/DL (ref 13–17)
MCH RBC QN AUTO: 28.8 PG (ref 25.2–33.5)
MCHC RBC AUTO-ENTMCNC: 30.6 G/DL (ref 28.4–34.8)
MCV RBC AUTO: 94.2 FL (ref 82.6–102.9)
NRBC AUTOMATED: 0 PER 100 WBC
PDW BLD-RTO: 16.3 % (ref 11.8–14.4)
PLATELET # BLD: 250 K/UL (ref 138–453)
PMV BLD AUTO: 12.1 FL (ref 8.1–13.5)
POTASSIUM SERPL-SCNC: 4 MMOL/L (ref 3.7–5.3)
RBC # BLD: 3.82 M/UL (ref 4.21–5.77)
SODIUM BLD-SCNC: 136 MMOL/L (ref 135–144)
WBC # BLD: 11.3 K/UL (ref 3.5–11.3)

## 2019-10-11 PROCEDURE — G0378 HOSPITAL OBSERVATION PER HR: HCPCS

## 2019-10-11 PROCEDURE — 85027 COMPLETE CBC AUTOMATED: CPT

## 2019-10-11 PROCEDURE — 74178 CT ABD&PLV WO CNTR FLWD CNTR: CPT

## 2019-10-11 PROCEDURE — 2580000003 HC RX 258: Performed by: STUDENT IN AN ORGANIZED HEALTH CARE EDUCATION/TRAINING PROGRAM

## 2019-10-11 PROCEDURE — 6360000004 HC RX CONTRAST MEDICATION: Performed by: STUDENT IN AN ORGANIZED HEALTH CARE EDUCATION/TRAINING PROGRAM

## 2019-10-11 PROCEDURE — 80048 BASIC METABOLIC PNL TOTAL CA: CPT

## 2019-10-11 PROCEDURE — 6360000002 HC RX W HCPCS: Performed by: STUDENT IN AN ORGANIZED HEALTH CARE EDUCATION/TRAINING PROGRAM

## 2019-10-11 PROCEDURE — 6370000000 HC RX 637 (ALT 250 FOR IP): Performed by: STUDENT IN AN ORGANIZED HEALTH CARE EDUCATION/TRAINING PROGRAM

## 2019-10-11 PROCEDURE — 36415 COLL VENOUS BLD VENIPUNCTURE: CPT

## 2019-10-11 PROCEDURE — 96376 TX/PRO/DX INJ SAME DRUG ADON: CPT

## 2019-10-11 RX ADMIN — GLIMEPIRIDE 2 MG: 2 TABLET ORAL at 07:06

## 2019-10-11 RX ADMIN — DOCUSATE SODIUM 200 MG: 100 CAPSULE, LIQUID FILLED ORAL at 08:40

## 2019-10-11 RX ADMIN — Medication 10 ML: at 08:40

## 2019-10-11 RX ADMIN — LEVOTHYROXINE SODIUM 25 MCG: 25 TABLET ORAL at 07:06

## 2019-10-11 RX ADMIN — DEXTROSE MONOHYDRATE 2 G: 50 INJECTION, SOLUTION INTRAVENOUS at 04:29

## 2019-10-11 RX ADMIN — IOVERSOL 130 ML: 741 INJECTION INTRA-ARTERIAL; INTRAVENOUS at 11:54

## 2019-10-11 RX ADMIN — CARBIDOPA AND LEVODOPA 1 TABLET: 25; 100 TABLET ORAL at 08:40

## 2019-10-11 RX ADMIN — AMLODIPINE BESYLATE 5 MG: 5 TABLET ORAL at 08:40

## 2019-10-11 ASSESSMENT — PAIN SCALES - GENERAL: PAINLEVEL_OUTOF10: 0

## 2019-10-12 LAB
CULTURE: ABNORMAL
CULTURE: ABNORMAL
Lab: ABNORMAL
SPECIMEN DESCRIPTION: ABNORMAL

## 2019-10-15 RX ORDER — LEVOFLOXACIN 500 MG/1
500 TABLET, FILM COATED ORAL DAILY
Qty: 7 TABLET | Refills: 0 | Status: SHIPPED | OUTPATIENT
Start: 2019-10-15 | End: 2019-10-22

## 2019-10-21 ENCOUNTER — OFFICE VISIT (OUTPATIENT)
Dept: UROLOGY | Age: 84
End: 2019-10-21
Payer: MEDICARE

## 2019-10-21 VITALS
HEIGHT: 73 IN | HEART RATE: 61 BPM | SYSTOLIC BLOOD PRESSURE: 188 MMHG | TEMPERATURE: 98 F | WEIGHT: 170.6 LBS | DIASTOLIC BLOOD PRESSURE: 80 MMHG | BODY MASS INDEX: 22.61 KG/M2

## 2019-10-21 DIAGNOSIS — R31.0 GROSS HEMATURIA: ICD-10-CM

## 2019-10-21 DIAGNOSIS — R33.9 URINARY RETENTION: Primary | ICD-10-CM

## 2019-10-21 DIAGNOSIS — C67.9 MALIGNANT NEOPLASM OF URINARY BLADDER, UNSPECIFIED SITE (HCC): Primary | ICD-10-CM

## 2019-10-21 DIAGNOSIS — N13.30 BILATERAL HYDRONEPHROSIS: ICD-10-CM

## 2019-10-21 DIAGNOSIS — C67.1 MALIGNANT NEOPLASM OF DOME OF URINARY BLADDER (HCC): ICD-10-CM

## 2019-10-21 PROCEDURE — 4040F PNEUMOC VAC/ADMIN/RCVD: CPT | Performed by: UROLOGY

## 2019-10-21 PROCEDURE — 99214 OFFICE O/P EST MOD 30 MIN: CPT | Performed by: UROLOGY

## 2019-10-21 PROCEDURE — 1036F TOBACCO NON-USER: CPT | Performed by: UROLOGY

## 2019-10-21 PROCEDURE — 51702 INSERT TEMP BLADDER CATH: CPT | Performed by: UROLOGY

## 2019-10-21 PROCEDURE — G8420 CALC BMI NORM PARAMETERS: HCPCS | Performed by: UROLOGY

## 2019-10-21 PROCEDURE — G8484 FLU IMMUNIZE NO ADMIN: HCPCS | Performed by: UROLOGY

## 2019-10-21 PROCEDURE — 1111F DSCHRG MED/CURRENT MED MERGE: CPT | Performed by: UROLOGY

## 2019-10-21 PROCEDURE — G8427 DOCREV CUR MEDS BY ELIG CLIN: HCPCS | Performed by: UROLOGY

## 2019-10-21 PROCEDURE — 1123F ACP DISCUSS/DSCN MKR DOCD: CPT | Performed by: UROLOGY

## 2019-10-21 RX ORDER — LISINOPRIL 5 MG/1
10 TABLET ORAL
Refills: 3 | COMMUNITY
Start: 2019-10-04 | End: 2019-10-21

## 2019-10-21 ASSESSMENT — ENCOUNTER SYMPTOMS
BACK PAIN: 0
EYE PAIN: 0
VOMITING: 0
ABDOMINAL PAIN: 0
CONSTIPATION: 1
EYE REDNESS: 0
WHEEZING: 0
COLOR CHANGE: 0
SHORTNESS OF BREATH: 0
NAUSEA: 0
COUGH: 0

## 2019-10-28 ENCOUNTER — HOSPITAL ENCOUNTER (OUTPATIENT)
Dept: RADIATION ONCOLOGY | Age: 84
Discharge: HOME | End: 2019-10-28
Payer: MEDICARE

## 2019-10-28 ENCOUNTER — TELEPHONE (OUTPATIENT)
Dept: ONCOLOGY | Age: 84
End: 2019-10-28

## 2019-10-28 ENCOUNTER — OFFICE VISIT (OUTPATIENT)
Dept: ONCOLOGY | Age: 84
End: 2019-10-28
Payer: MEDICARE

## 2019-10-28 VITALS
DIASTOLIC BLOOD PRESSURE: 70 MMHG | SYSTOLIC BLOOD PRESSURE: 165 MMHG | WEIGHT: 170 LBS | HEART RATE: 53 BPM | TEMPERATURE: 97.6 F | BODY MASS INDEX: 22.43 KG/M2

## 2019-10-28 VITALS
OXYGEN SATURATION: 99 % | RESPIRATION RATE: 18 BRPM | HEART RATE: 62 BPM | DIASTOLIC BLOOD PRESSURE: 70 MMHG | SYSTOLIC BLOOD PRESSURE: 170 MMHG | TEMPERATURE: 97.6 F | BODY MASS INDEX: 22.56 KG/M2 | WEIGHT: 171 LBS

## 2019-10-28 DIAGNOSIS — C67.9 MALIGNANT NEOPLASM OF URINARY BLADDER, UNSPECIFIED SITE (HCC): ICD-10-CM

## 2019-10-28 DIAGNOSIS — C02.9 TONGUE CANCER (HCC): ICD-10-CM

## 2019-10-28 DIAGNOSIS — C67.1 MALIGNANT NEOPLASM OF DOME OF URINARY BLADDER (HCC): Primary | ICD-10-CM

## 2019-10-28 PROCEDURE — 99211 OFF/OP EST MAY X REQ PHY/QHP: CPT | Performed by: INTERNAL MEDICINE

## 2019-10-28 PROCEDURE — 99215 OFFICE O/P EST HI 40 MIN: CPT | Performed by: INTERNAL MEDICINE

## 2019-10-28 PROCEDURE — 1036F TOBACCO NON-USER: CPT | Performed by: INTERNAL MEDICINE

## 2019-10-28 PROCEDURE — 1123F ACP DISCUSS/DSCN MKR DOCD: CPT | Performed by: INTERNAL MEDICINE

## 2019-10-28 PROCEDURE — 4040F PNEUMOC VAC/ADMIN/RCVD: CPT | Performed by: INTERNAL MEDICINE

## 2019-10-28 PROCEDURE — G8427 DOCREV CUR MEDS BY ELIG CLIN: HCPCS | Performed by: INTERNAL MEDICINE

## 2019-10-28 PROCEDURE — G8420 CALC BMI NORM PARAMETERS: HCPCS | Performed by: INTERNAL MEDICINE

## 2019-10-28 PROCEDURE — 1111F DSCHRG MED/CURRENT MED MERGE: CPT | Performed by: INTERNAL MEDICINE

## 2019-10-28 PROCEDURE — 99213 OFFICE O/P EST LOW 20 MIN: CPT | Performed by: RADIOLOGY

## 2019-10-28 PROCEDURE — G8484 FLU IMMUNIZE NO ADMIN: HCPCS | Performed by: INTERNAL MEDICINE

## 2019-10-28 RX ORDER — LISINOPRIL 5 MG/1
5 TABLET ORAL NIGHTLY
COMMUNITY

## 2019-11-01 ENCOUNTER — HOSPITAL ENCOUNTER (OUTPATIENT)
Age: 84
Setting detail: SPECIMEN
Discharge: HOME | End: 2019-11-01
Payer: MEDICARE

## 2019-11-01 ENCOUNTER — OFFICE VISIT (OUTPATIENT)
Dept: INTERNAL MEDICINE CLINIC | Age: 84
End: 2019-11-01
Payer: MEDICARE

## 2019-11-01 VITALS
HEART RATE: 59 BPM | BODY MASS INDEX: 22.53 KG/M2 | OXYGEN SATURATION: 98 % | HEIGHT: 73 IN | DIASTOLIC BLOOD PRESSURE: 68 MMHG | SYSTOLIC BLOOD PRESSURE: 132 MMHG | WEIGHT: 170 LBS

## 2019-11-01 DIAGNOSIS — M05.20 RHEUMATOID ARTERITIS (HCC): ICD-10-CM

## 2019-11-01 DIAGNOSIS — Z79.4 TYPE 2 DIABETES MELLITUS WITH DIABETIC POLYNEUROPATHY, WITH LONG-TERM CURRENT USE OF INSULIN (HCC): Primary | ICD-10-CM

## 2019-11-01 DIAGNOSIS — E11.42 TYPE 2 DIABETES MELLITUS WITH DIABETIC POLYNEUROPATHY, WITH LONG-TERM CURRENT USE OF INSULIN (HCC): Primary | ICD-10-CM

## 2019-11-01 DIAGNOSIS — E03.9 HYPOTHYROIDISM, UNSPECIFIED TYPE: ICD-10-CM

## 2019-11-01 DIAGNOSIS — D50.9 IRON DEFICIENCY ANEMIA, UNSPECIFIED IRON DEFICIENCY ANEMIA TYPE: ICD-10-CM

## 2019-11-01 DIAGNOSIS — E08.42 DIABETIC POLYNEUROPATHY ASSOCIATED WITH DIABETES MELLITUS DUE TO UNDERLYING CONDITION (HCC): ICD-10-CM

## 2019-11-01 DIAGNOSIS — K59.04 CHRONIC IDIOPATHIC CONSTIPATION: ICD-10-CM

## 2019-11-01 DIAGNOSIS — C67.9 MALIGNANT NEOPLASM OF URINARY BLADDER, UNSPECIFIED SITE (HCC): ICD-10-CM

## 2019-11-01 LAB
FERRITIN: 81 UG/L (ref 30–400)
IRON SATURATION: 10 % (ref 20–55)
IRON: 30 UG/DL (ref 59–158)
TOTAL IRON BINDING CAPACITY: 296 UG/DL (ref 250–450)
UNSATURATED IRON BINDING CAPACITY: 266 UG/DL (ref 112–347)

## 2019-11-01 PROCEDURE — 1036F TOBACCO NON-USER: CPT | Performed by: INTERNAL MEDICINE

## 2019-11-01 PROCEDURE — G8420 CALC BMI NORM PARAMETERS: HCPCS | Performed by: INTERNAL MEDICINE

## 2019-11-01 PROCEDURE — 1123F ACP DISCUSS/DSCN MKR DOCD: CPT | Performed by: INTERNAL MEDICINE

## 2019-11-01 PROCEDURE — G8428 CUR MEDS NOT DOCUMENT: HCPCS | Performed by: INTERNAL MEDICINE

## 2019-11-01 PROCEDURE — 99214 OFFICE O/P EST MOD 30 MIN: CPT | Performed by: INTERNAL MEDICINE

## 2019-11-01 PROCEDURE — G8484 FLU IMMUNIZE NO ADMIN: HCPCS | Performed by: INTERNAL MEDICINE

## 2019-11-01 PROCEDURE — 4040F PNEUMOC VAC/ADMIN/RCVD: CPT | Performed by: INTERNAL MEDICINE

## 2019-11-01 PROCEDURE — 1111F DSCHRG MED/CURRENT MED MERGE: CPT | Performed by: INTERNAL MEDICINE

## 2019-11-01 RX ORDER — INSULIN GLARGINE 100 [IU]/ML
6 INJECTION, SOLUTION SUBCUTANEOUS NIGHTLY
COMMUNITY
End: 2019-11-01 | Stop reason: ALTCHOICE

## 2019-11-01 ASSESSMENT — ENCOUNTER SYMPTOMS
SHORTNESS OF BREATH: 0
FACIAL SWELLING: 0
DIARRHEA: 0
WHEEZING: 0
ABDOMINAL DISTENTION: 0
COLOR CHANGE: 0
COUGH: 0
APNEA: 0
BACK PAIN: 0
ABDOMINAL PAIN: 0
CONSTIPATION: 1
CHEST TIGHTNESS: 0

## 2019-11-04 ENCOUNTER — HOSPITAL ENCOUNTER (OUTPATIENT)
Dept: ULTRASOUND IMAGING | Age: 84
Discharge: HOME | End: 2019-11-06
Payer: MEDICARE

## 2019-11-04 ENCOUNTER — TELEPHONE (OUTPATIENT)
Dept: INTERNAL MEDICINE CLINIC | Age: 84
End: 2019-11-04

## 2019-11-04 DIAGNOSIS — D50.9 IRON DEFICIENCY ANEMIA, UNSPECIFIED IRON DEFICIENCY ANEMIA TYPE: Primary | ICD-10-CM

## 2019-11-04 DIAGNOSIS — N13.30 BILATERAL HYDRONEPHROSIS: ICD-10-CM

## 2019-11-04 DIAGNOSIS — C67.1 MALIGNANT NEOPLASM OF DOME OF URINARY BLADDER (HCC): ICD-10-CM

## 2019-11-04 PROCEDURE — 76775 US EXAM ABDO BACK WALL LIM: CPT

## 2019-11-04 RX ORDER — SODIUM CHLORIDE 9 MG/ML
INJECTION, SOLUTION INTRAVENOUS CONTINUOUS
Status: CANCELLED | OUTPATIENT
Start: 2019-11-11

## 2019-11-04 RX ORDER — EPINEPHRINE 1 MG/ML
0.3 INJECTION, SOLUTION, CONCENTRATE INTRAVENOUS PRN
Status: CANCELLED | OUTPATIENT
Start: 2019-11-11

## 2019-11-04 RX ORDER — MEPERIDINE HYDROCHLORIDE 50 MG/ML
12.5 INJECTION INTRAMUSCULAR; INTRAVENOUS; SUBCUTANEOUS ONCE
Status: CANCELLED | OUTPATIENT
Start: 2019-11-11

## 2019-11-04 RX ORDER — METHYLPREDNISOLONE SODIUM SUCCINATE 125 MG/2ML
125 INJECTION, POWDER, LYOPHILIZED, FOR SOLUTION INTRAMUSCULAR; INTRAVENOUS ONCE
Status: CANCELLED | OUTPATIENT
Start: 2019-11-11

## 2019-11-04 RX ORDER — HEPARIN SODIUM (PORCINE) LOCK FLUSH IV SOLN 100 UNIT/ML 100 UNIT/ML
500 SOLUTION INTRAVENOUS PRN
Status: CANCELLED | OUTPATIENT
Start: 2019-11-11

## 2019-11-04 RX ORDER — SODIUM CHLORIDE 0.9 % (FLUSH) 0.9 %
10 SYRINGE (ML) INJECTION PRN
Status: CANCELLED | OUTPATIENT
Start: 2019-11-11

## 2019-11-04 RX ORDER — DIPHENHYDRAMINE HYDROCHLORIDE 50 MG/ML
50 INJECTION INTRAMUSCULAR; INTRAVENOUS ONCE
Status: CANCELLED | OUTPATIENT
Start: 2019-11-11

## 2019-11-04 RX ORDER — SODIUM CHLORIDE 9 MG/ML
20 INJECTION, SOLUTION INTRAVENOUS ONCE
Status: CANCELLED | OUTPATIENT
Start: 2019-11-11

## 2019-11-04 RX ORDER — SODIUM CHLORIDE 0.9 % (FLUSH) 0.9 %
5 SYRINGE (ML) INJECTION PRN
Status: CANCELLED | OUTPATIENT
Start: 2019-11-11

## 2019-11-04 RX ORDER — FERROUS SULFATE 325(65) MG
325 TABLET ORAL 2 TIMES DAILY
Qty: 180 TABLET | Refills: 1 | Status: SHIPPED | OUTPATIENT
Start: 2019-11-04 | End: 2019-12-09 | Stop reason: ALTCHOICE

## 2019-11-06 ENCOUNTER — TELEPHONE (OUTPATIENT)
Dept: ONCOLOGY | Age: 84
End: 2019-11-06

## 2019-11-06 DIAGNOSIS — C67.9 MALIGNANT NEOPLASM OF URINARY BLADDER, UNSPECIFIED SITE (HCC): Primary | ICD-10-CM

## 2019-11-08 ENCOUNTER — HOSPITAL ENCOUNTER (OUTPATIENT)
Age: 84
Setting detail: SPECIMEN
Discharge: HOME | End: 2019-11-08
Payer: MEDICARE

## 2019-11-08 DIAGNOSIS — C67.1 MALIGNANT NEOPLASM OF DOME OF URINARY BLADDER (HCC): ICD-10-CM

## 2019-11-08 DIAGNOSIS — N13.30 BILATERAL HYDRONEPHROSIS: ICD-10-CM

## 2019-11-08 LAB
ANION GAP SERPL CALCULATED.3IONS-SCNC: 14 MMOL/L (ref 9–17)
BUN BLDV-MCNC: 20 MG/DL (ref 8–23)
BUN/CREAT BLD: ABNORMAL (ref 9–20)
CALCIUM SERPL-MCNC: 9.5 MG/DL (ref 8.6–10.4)
CHLORIDE BLD-SCNC: 102 MMOL/L (ref 98–107)
CO2: 27 MMOL/L (ref 20–31)
CREAT SERPL-MCNC: 0.77 MG/DL (ref 0.7–1.2)
GFR AFRICAN AMERICAN: >60 ML/MIN
GFR NON-AFRICAN AMERICAN: >60 ML/MIN
GFR SERPL CREATININE-BSD FRML MDRD: ABNORMAL ML/MIN/{1.73_M2}
GFR SERPL CREATININE-BSD FRML MDRD: ABNORMAL ML/MIN/{1.73_M2}
GLUCOSE BLD-MCNC: 123 MG/DL (ref 70–99)
POTASSIUM SERPL-SCNC: 4.2 MMOL/L (ref 3.7–5.3)
SODIUM BLD-SCNC: 143 MMOL/L (ref 135–144)

## 2019-11-11 ENCOUNTER — OFFICE VISIT (OUTPATIENT)
Dept: UROLOGY | Age: 84
End: 2019-11-11
Payer: MEDICARE

## 2019-11-11 ENCOUNTER — HOSPITAL ENCOUNTER (OUTPATIENT)
Dept: INFUSION THERAPY | Age: 84
Discharge: HOME | End: 2019-11-11
Payer: MEDICARE

## 2019-11-11 VITALS
HEIGHT: 73 IN | DIASTOLIC BLOOD PRESSURE: 70 MMHG | WEIGHT: 169 LBS | SYSTOLIC BLOOD PRESSURE: 138 MMHG | BODY MASS INDEX: 22.4 KG/M2 | TEMPERATURE: 97.3 F

## 2019-11-11 VITALS
BODY MASS INDEX: 22.3 KG/M2 | RESPIRATION RATE: 16 BRPM | SYSTOLIC BLOOD PRESSURE: 186 MMHG | TEMPERATURE: 97.5 F | DIASTOLIC BLOOD PRESSURE: 68 MMHG | WEIGHT: 169 LBS | HEART RATE: 62 BPM

## 2019-11-11 DIAGNOSIS — C67.9 MALIGNANT NEOPLASM OF URINARY BLADDER, UNSPECIFIED SITE (HCC): Primary | ICD-10-CM

## 2019-11-11 DIAGNOSIS — N13.30 BILATERAL HYDRONEPHROSIS: ICD-10-CM

## 2019-11-11 DIAGNOSIS — C67.1 MALIGNANT NEOPLASM OF DOME OF URINARY BLADDER (HCC): ICD-10-CM

## 2019-11-11 DIAGNOSIS — R33.9 URINARY RETENTION: ICD-10-CM

## 2019-11-11 LAB
ABSOLUTE EOS #: 0.6 K/UL (ref 0–0.4)
ABSOLUTE IMMATURE GRANULOCYTE: ABNORMAL K/UL (ref 0–0.3)
ABSOLUTE LYMPH #: 0.8 K/UL (ref 1–4.8)
ABSOLUTE MONO #: 0.8 K/UL (ref 0.1–1.2)
ALBUMIN SERPL-MCNC: 3.8 G/DL (ref 3.5–5.2)
ALBUMIN/GLOBULIN RATIO: 1.3 (ref 1–2.5)
ALP BLD-CCNC: 101 U/L (ref 40–129)
ALT SERPL-CCNC: 11 U/L (ref 5–41)
AMYLASE: 31 U/L (ref 28–100)
ANION GAP SERPL CALCULATED.3IONS-SCNC: 9 MMOL/L (ref 9–17)
AST SERPL-CCNC: 16 U/L
BASOPHILS # BLD: 1 % (ref 0–2)
BASOPHILS ABSOLUTE: 0.1 K/UL (ref 0–0.2)
BILIRUB SERPL-MCNC: 0.98 MG/DL (ref 0.3–1.2)
BUN BLDV-MCNC: 24 MG/DL (ref 8–23)
BUN/CREAT BLD: ABNORMAL (ref 9–20)
CALCIUM SERPL-MCNC: 9.6 MG/DL (ref 8.6–10.4)
CHLORIDE BLD-SCNC: 96 MMOL/L (ref 98–107)
CO2: 31 MMOL/L (ref 20–31)
CORTISOL COLLECTION INFO: NORMAL
CORTISOL: 15.4 UG/DL (ref 2.7–18.4)
CREAT SERPL-MCNC: 0.93 MG/DL (ref 0.7–1.2)
DIFFERENTIAL TYPE: ABNORMAL
EOSINOPHILS RELATIVE PERCENT: 6 % (ref 1–4)
GFR AFRICAN AMERICAN: >60 ML/MIN
GFR NON-AFRICAN AMERICAN: >60 ML/MIN
GFR SERPL CREATININE-BSD FRML MDRD: ABNORMAL ML/MIN/{1.73_M2}
GFR SERPL CREATININE-BSD FRML MDRD: ABNORMAL ML/MIN/{1.73_M2}
GLUCOSE BLD-MCNC: 194 MG/DL (ref 70–99)
HCT VFR BLD CALC: 33.9 % (ref 41–53)
HEMOGLOBIN: 11 G/DL (ref 13.5–17.5)
IMMATURE GRANULOCYTES: ABNORMAL %
LIPASE: 22 U/L (ref 13–60)
LYMPHOCYTES # BLD: 8 % (ref 24–44)
MCH RBC QN AUTO: 28.3 PG (ref 26–34)
MCHC RBC AUTO-ENTMCNC: 32.3 G/DL (ref 31–37)
MCV RBC AUTO: 87.6 FL (ref 80–100)
MONOCYTES # BLD: 8 % (ref 2–11)
NRBC AUTOMATED: ABNORMAL PER 100 WBC
PDW BLD-RTO: 17.4 % (ref 12.5–15.4)
PLATELET # BLD: 270 K/UL (ref 140–450)
PLATELET ESTIMATE: ABNORMAL
PMV BLD AUTO: 9.5 FL (ref 6–12)
POTASSIUM SERPL-SCNC: 4.5 MMOL/L (ref 3.7–5.3)
RBC # BLD: 3.88 M/UL (ref 4.5–5.9)
RBC # BLD: ABNORMAL 10*6/UL
SEG NEUTROPHILS: 77 % (ref 36–66)
SEGMENTED NEUTROPHILS ABSOLUTE COUNT: 8.2 K/UL (ref 1.8–7.7)
SODIUM BLD-SCNC: 136 MMOL/L (ref 135–144)
TOTAL PROTEIN: 6.7 G/DL (ref 6.4–8.3)
TSH SERPL DL<=0.05 MIU/L-ACNC: 6.23 MIU/L (ref 0.3–5)
WBC # BLD: 10.6 K/UL (ref 3.5–11)
WBC # BLD: ABNORMAL 10*3/UL

## 2019-11-11 PROCEDURE — G8484 FLU IMMUNIZE NO ADMIN: HCPCS | Performed by: UROLOGY

## 2019-11-11 PROCEDURE — 82150 ASSAY OF AMYLASE: CPT

## 2019-11-11 PROCEDURE — 51702 INSERT TEMP BLADDER CATH: CPT | Performed by: UROLOGY

## 2019-11-11 PROCEDURE — 83690 ASSAY OF LIPASE: CPT

## 2019-11-11 PROCEDURE — G8427 DOCREV CUR MEDS BY ELIG CLIN: HCPCS | Performed by: UROLOGY

## 2019-11-11 PROCEDURE — 2580000003 HC RX 258: Performed by: INTERNAL MEDICINE

## 2019-11-11 PROCEDURE — 1036F TOBACCO NON-USER: CPT | Performed by: UROLOGY

## 2019-11-11 PROCEDURE — G8420 CALC BMI NORM PARAMETERS: HCPCS | Performed by: UROLOGY

## 2019-11-11 PROCEDURE — 36415 COLL VENOUS BLD VENIPUNCTURE: CPT

## 2019-11-11 PROCEDURE — 80053 COMPREHEN METABOLIC PANEL: CPT

## 2019-11-11 PROCEDURE — 4040F PNEUMOC VAC/ADMIN/RCVD: CPT | Performed by: UROLOGY

## 2019-11-11 PROCEDURE — 1123F ACP DISCUSS/DSCN MKR DOCD: CPT | Performed by: UROLOGY

## 2019-11-11 PROCEDURE — 82533 TOTAL CORTISOL: CPT

## 2019-11-11 PROCEDURE — 6360000002 HC RX W HCPCS: Performed by: INTERNAL MEDICINE

## 2019-11-11 PROCEDURE — 99214 OFFICE O/P EST MOD 30 MIN: CPT | Performed by: UROLOGY

## 2019-11-11 PROCEDURE — 85025 COMPLETE CBC W/AUTO DIFF WBC: CPT

## 2019-11-11 PROCEDURE — 84443 ASSAY THYROID STIM HORMONE: CPT

## 2019-11-11 PROCEDURE — 96413 CHEMO IV INFUSION 1 HR: CPT

## 2019-11-11 RX ORDER — SODIUM CHLORIDE 9 MG/ML
20 INJECTION, SOLUTION INTRAVENOUS ONCE
Status: COMPLETED | OUTPATIENT
Start: 2019-11-11 | End: 2019-11-11

## 2019-11-11 RX ORDER — DEXAMETHASONE SODIUM PHOSPHATE 10 MG/ML
8 INJECTION, SOLUTION INTRAMUSCULAR; INTRAVENOUS ONCE
Status: DISCONTINUED | OUTPATIENT
Start: 2019-11-11 | End: 2019-11-11

## 2019-11-11 RX ADMIN — ATEZOLIZUMAB 1200 MG: 1200 INJECTION, SOLUTION INTRAVENOUS at 09:55

## 2019-11-11 RX ADMIN — SODIUM CHLORIDE 20 ML/HR: 9 INJECTION, SOLUTION INTRAVENOUS at 09:55

## 2019-11-11 ASSESSMENT — ENCOUNTER SYMPTOMS
DIARRHEA: 0
SHORTNESS OF BREATH: 0
CONSTIPATION: 1
COUGH: 0
VOMITING: 0
NAUSEA: 0
WHEEZING: 0
ABDOMINAL PAIN: 0
BACK PAIN: 1
EYE REDNESS: 0
EYE PAIN: 0

## 2019-11-11 NOTE — PROGRESS NOTES
Pt here for C1D1 Tecentriq. Denies any complaints. Labs drawn and results reviewed. Pt provided with [facility] information on Tecentriq and possible side effects discussed. Pili Clifford also educated pt on medication. Pt signed consent Pt was treated without incident and d/c'd in stable condition. Pt will return on 12-2-19 for follow up with Dr Devon Brunner and Catherine Hurley

## 2019-11-12 ENCOUNTER — HOSPITAL ENCOUNTER (EMERGENCY)
Age: 84
Discharge: HOME | End: 2019-11-12
Attending: EMERGENCY MEDICINE
Payer: MEDICARE

## 2019-11-12 VITALS
HEIGHT: 73 IN | OXYGEN SATURATION: 98 % | HEART RATE: 79 BPM | WEIGHT: 170 LBS | BODY MASS INDEX: 22.53 KG/M2 | RESPIRATION RATE: 14 BRPM | DIASTOLIC BLOOD PRESSURE: 89 MMHG | TEMPERATURE: 97.8 F | SYSTOLIC BLOOD PRESSURE: 166 MMHG

## 2019-11-12 DIAGNOSIS — R33.9 URINARY RETENTION: Primary | ICD-10-CM

## 2019-11-12 DIAGNOSIS — R31.9 HEMATURIA, UNSPECIFIED TYPE: ICD-10-CM

## 2019-11-12 LAB
-: ABNORMAL
AMORPHOUS: ABNORMAL
BACTERIA: ABNORMAL
BILIRUBIN URINE: NEGATIVE
CASTS UA: ABNORMAL /LPF
COLOR: ABNORMAL
COMMENT UA: ABNORMAL
CRYSTALS, UA: ABNORMAL /HPF
EPITHELIAL CELLS UA: ABNORMAL /HPF
GLUCOSE URINE: ABNORMAL
KETONES, URINE: ABNORMAL
LEUKOCYTE ESTERASE, URINE: ABNORMAL
MUCUS: ABNORMAL
NITRITE, URINE: NEGATIVE
OTHER OBSERVATIONS UA: ABNORMAL
PH UA: 6.5 (ref 5–8)
PROTEIN UA: ABNORMAL
RBC UA: ABNORMAL /HPF
RENAL EPITHELIAL, UA: ABNORMAL /HPF
SPECIFIC GRAVITY UA: 1.02 (ref 1–1.03)
TRICHOMONAS: ABNORMAL
TURBIDITY: ABNORMAL
URINE HGB: ABNORMAL
UROBILINOGEN, URINE: NORMAL
WBC UA: ABNORMAL /HPF
YEAST: ABNORMAL

## 2019-11-12 PROCEDURE — 6370000000 HC RX 637 (ALT 250 FOR IP): Performed by: EMERGENCY MEDICINE

## 2019-11-12 PROCEDURE — 87077 CULTURE AEROBIC IDENTIFY: CPT

## 2019-11-12 PROCEDURE — 87186 SC STD MICRODIL/AGAR DIL: CPT

## 2019-11-12 PROCEDURE — 87086 URINE CULTURE/COLONY COUNT: CPT

## 2019-11-12 PROCEDURE — 99284 EMERGENCY DEPT VISIT MOD MDM: CPT

## 2019-11-12 PROCEDURE — 81001 URINALYSIS AUTO W/SCOPE: CPT

## 2019-11-12 RX ORDER — LIDOCAINE HYDROCHLORIDE 20 MG/ML
JELLY TOPICAL ONCE
Status: COMPLETED | OUTPATIENT
Start: 2019-11-12 | End: 2019-11-12

## 2019-11-12 RX ORDER — ONDANSETRON 2 MG/ML
4 INJECTION INTRAMUSCULAR; INTRAVENOUS ONCE
Status: DISCONTINUED | OUTPATIENT
Start: 2019-11-12 | End: 2019-11-12

## 2019-11-12 RX ORDER — LIDOCAINE HYDROCHLORIDE 20 MG/ML
JELLY TOPICAL
Status: DISCONTINUED
Start: 2019-11-12 | End: 2019-11-12 | Stop reason: HOSPADM

## 2019-11-12 RX ORDER — 0.9 % SODIUM CHLORIDE 0.9 %
1000 INTRAVENOUS SOLUTION INTRAVENOUS ONCE
Status: DISCONTINUED | OUTPATIENT
Start: 2019-11-12 | End: 2019-11-12

## 2019-11-12 RX ORDER — MORPHINE SULFATE 4 MG/ML
4 INJECTION, SOLUTION INTRAMUSCULAR; INTRAVENOUS ONCE
Status: DISCONTINUED | OUTPATIENT
Start: 2019-11-12 | End: 2019-11-12

## 2019-11-12 RX ADMIN — LIDOCAINE HYDROCHLORIDE: 20 JELLY TOPICAL at 07:53

## 2019-11-12 ASSESSMENT — PAIN DESCRIPTION - LOCATION: LOCATION: ABDOMEN

## 2019-11-12 ASSESSMENT — PAIN SCALES - GENERAL: PAINLEVEL_OUTOF10: 10

## 2019-11-13 ENCOUNTER — TELEPHONE (OUTPATIENT)
Dept: UROLOGY | Age: 84
End: 2019-11-13

## 2019-11-13 ENCOUNTER — CARE COORDINATION (OUTPATIENT)
Dept: CARE COORDINATION | Age: 84
End: 2019-11-13

## 2019-11-13 LAB
CULTURE: ABNORMAL
Lab: ABNORMAL
SPECIMEN DESCRIPTION: ABNORMAL

## 2019-11-19 ENCOUNTER — HOSPITAL ENCOUNTER (OUTPATIENT)
Dept: RADIATION ONCOLOGY | Age: 84
Discharge: HOME | End: 2019-11-19
Payer: MEDICARE

## 2019-11-19 VITALS
BODY MASS INDEX: 22.19 KG/M2 | RESPIRATION RATE: 18 BRPM | WEIGHT: 168.2 LBS | DIASTOLIC BLOOD PRESSURE: 68 MMHG | SYSTOLIC BLOOD PRESSURE: 162 MMHG | HEART RATE: 59 BPM | TEMPERATURE: 98.2 F

## 2019-11-19 PROCEDURE — 99212 OFFICE O/P EST SF 10 MIN: CPT | Performed by: RADIOLOGY

## 2019-12-02 ENCOUNTER — OFFICE VISIT (OUTPATIENT)
Dept: ONCOLOGY | Age: 84
End: 2019-12-02
Payer: MEDICARE

## 2019-12-02 ENCOUNTER — HOSPITAL ENCOUNTER (OUTPATIENT)
Dept: INFUSION THERAPY | Age: 84
Discharge: HOME | End: 2019-12-02
Payer: MEDICARE

## 2019-12-02 ENCOUNTER — HOSPITAL ENCOUNTER (OUTPATIENT)
Dept: ULTRASOUND IMAGING | Age: 84
Discharge: HOME | End: 2019-12-04
Payer: MEDICARE

## 2019-12-02 ENCOUNTER — TELEPHONE (OUTPATIENT)
Dept: ONCOLOGY | Age: 84
End: 2019-12-02

## 2019-12-02 VITALS
HEART RATE: 74 BPM | SYSTOLIC BLOOD PRESSURE: 179 MMHG | BODY MASS INDEX: 21.97 KG/M2 | TEMPERATURE: 97.8 F | WEIGHT: 166.5 LBS | DIASTOLIC BLOOD PRESSURE: 74 MMHG

## 2019-12-02 DIAGNOSIS — C67.9 MALIGNANT NEOPLASM OF URINARY BLADDER, UNSPECIFIED SITE (HCC): ICD-10-CM

## 2019-12-02 DIAGNOSIS — C67.9 MALIGNANT NEOPLASM OF URINARY BLADDER, UNSPECIFIED SITE (HCC): Primary | ICD-10-CM

## 2019-12-02 DIAGNOSIS — N13.30 BILATERAL HYDRONEPHROSIS: ICD-10-CM

## 2019-12-02 DIAGNOSIS — C67.1 MALIGNANT NEOPLASM OF DOME OF URINARY BLADDER (HCC): ICD-10-CM

## 2019-12-02 LAB
ABSOLUTE EOS #: 0.59 K/UL (ref 0–0.4)
ABSOLUTE IMMATURE GRANULOCYTE: ABNORMAL K/UL (ref 0–0.3)
ABSOLUTE LYMPH #: 1.18 K/UL (ref 1–4.8)
ABSOLUTE MONO #: 1.06 K/UL (ref 0.1–0.8)
ALBUMIN SERPL-MCNC: 3.9 G/DL (ref 3.5–5.2)
ALBUMIN/GLOBULIN RATIO: 1.3 (ref 1–2.5)
ALP BLD-CCNC: 108 U/L (ref 40–129)
ALT SERPL-CCNC: 5 U/L (ref 5–41)
ANION GAP SERPL CALCULATED.3IONS-SCNC: 7 MMOL/L (ref 9–17)
AST SERPL-CCNC: 18 U/L
BASOPHILS # BLD: 0 % (ref 0–2)
BASOPHILS ABSOLUTE: 0 K/UL (ref 0–0.2)
BILIRUB SERPL-MCNC: 0.76 MG/DL (ref 0.3–1.2)
BUN BLDV-MCNC: 31 MG/DL (ref 8–23)
BUN/CREAT BLD: ABNORMAL (ref 9–20)
CALCIUM SERPL-MCNC: 11 MG/DL (ref 8.6–10.4)
CHLORIDE BLD-SCNC: 96 MMOL/L (ref 98–107)
CO2: 33 MMOL/L (ref 20–31)
CREAT SERPL-MCNC: 0.99 MG/DL (ref 0.7–1.2)
DIFFERENTIAL TYPE: ABNORMAL
EOSINOPHILS RELATIVE PERCENT: 5 % (ref 1–4)
GFR AFRICAN AMERICAN: >60 ML/MIN
GFR NON-AFRICAN AMERICAN: >60 ML/MIN
GFR SERPL CREATININE-BSD FRML MDRD: ABNORMAL ML/MIN/{1.73_M2}
GFR SERPL CREATININE-BSD FRML MDRD: ABNORMAL ML/MIN/{1.73_M2}
GLUCOSE BLD-MCNC: 171 MG/DL (ref 70–99)
HCT VFR BLD CALC: 32 % (ref 41–53)
HEMOGLOBIN: 10.4 G/DL (ref 13.5–17.5)
IMMATURE GRANULOCYTES: ABNORMAL %
LYMPHOCYTES # BLD: 10 % (ref 24–44)
MCH RBC QN AUTO: 28 PG (ref 26–34)
MCHC RBC AUTO-ENTMCNC: 32.3 G/DL (ref 31–37)
MCV RBC AUTO: 86.7 FL (ref 80–100)
MONOCYTES # BLD: 9 % (ref 1–7)
MORPHOLOGY: ABNORMAL
NRBC AUTOMATED: ABNORMAL PER 100 WBC
PDW BLD-RTO: 16.5 % (ref 12.5–15.4)
PLATELET # BLD: 335 K/UL (ref 140–450)
PLATELET ESTIMATE: ABNORMAL
PMV BLD AUTO: 8.8 FL (ref 6–12)
POTASSIUM SERPL-SCNC: 4.5 MMOL/L (ref 3.7–5.3)
RBC # BLD: 3.69 M/UL (ref 4.5–5.9)
RBC # BLD: ABNORMAL 10*6/UL
SEG NEUTROPHILS: 76 % (ref 36–66)
SEGMENTED NEUTROPHILS ABSOLUTE COUNT: 8.97 K/UL (ref 1.8–7.7)
SODIUM BLD-SCNC: 136 MMOL/L (ref 135–144)
TOTAL PROTEIN: 6.8 G/DL (ref 6.4–8.3)
WBC # BLD: 11.8 K/UL (ref 3.5–11)
WBC # BLD: ABNORMAL 10*3/UL

## 2019-12-02 PROCEDURE — 80053 COMPREHEN METABOLIC PANEL: CPT

## 2019-12-02 PROCEDURE — G8484 FLU IMMUNIZE NO ADMIN: HCPCS | Performed by: INTERNAL MEDICINE

## 2019-12-02 PROCEDURE — 85025 COMPLETE CBC W/AUTO DIFF WBC: CPT

## 2019-12-02 PROCEDURE — 1123F ACP DISCUSS/DSCN MKR DOCD: CPT | Performed by: INTERNAL MEDICINE

## 2019-12-02 PROCEDURE — 6360000002 HC RX W HCPCS: Performed by: INTERNAL MEDICINE

## 2019-12-02 PROCEDURE — 96413 CHEMO IV INFUSION 1 HR: CPT

## 2019-12-02 PROCEDURE — 76775 US EXAM ABDO BACK WALL LIM: CPT

## 2019-12-02 PROCEDURE — 36415 COLL VENOUS BLD VENIPUNCTURE: CPT

## 2019-12-02 PROCEDURE — 2580000003 HC RX 258: Performed by: INTERNAL MEDICINE

## 2019-12-02 PROCEDURE — 1036F TOBACCO NON-USER: CPT | Performed by: INTERNAL MEDICINE

## 2019-12-02 PROCEDURE — 99214 OFFICE O/P EST MOD 30 MIN: CPT | Performed by: INTERNAL MEDICINE

## 2019-12-02 PROCEDURE — 4040F PNEUMOC VAC/ADMIN/RCVD: CPT | Performed by: INTERNAL MEDICINE

## 2019-12-02 PROCEDURE — G8427 DOCREV CUR MEDS BY ELIG CLIN: HCPCS | Performed by: INTERNAL MEDICINE

## 2019-12-02 PROCEDURE — G8420 CALC BMI NORM PARAMETERS: HCPCS | Performed by: INTERNAL MEDICINE

## 2019-12-02 RX ORDER — SODIUM CHLORIDE 9 MG/ML
INJECTION, SOLUTION INTRAVENOUS CONTINUOUS
Status: CANCELLED | OUTPATIENT
Start: 2020-01-13

## 2019-12-02 RX ORDER — HYDROCODONE BITARTRATE AND ACETAMINOPHEN 5; 325 MG/1; MG/1
1 TABLET ORAL EVERY 8 HOURS PRN
Qty: 90 TABLET | Refills: 0 | Status: SHIPPED | OUTPATIENT
Start: 2019-12-02 | End: 2020-01-01

## 2019-12-02 RX ORDER — MEPERIDINE HYDROCHLORIDE 50 MG/ML
12.5 INJECTION INTRAMUSCULAR; INTRAVENOUS; SUBCUTANEOUS ONCE
Status: CANCELLED | OUTPATIENT
Start: 2020-01-13

## 2019-12-02 RX ORDER — DIPHENHYDRAMINE HYDROCHLORIDE 50 MG/ML
50 INJECTION INTRAMUSCULAR; INTRAVENOUS ONCE
Status: CANCELLED | OUTPATIENT
Start: 2020-01-13

## 2019-12-02 RX ORDER — METHYLPREDNISOLONE SODIUM SUCCINATE 125 MG/2ML
125 INJECTION, POWDER, LYOPHILIZED, FOR SOLUTION INTRAMUSCULAR; INTRAVENOUS ONCE
Status: CANCELLED | OUTPATIENT
Start: 2019-12-23

## 2019-12-02 RX ORDER — MEPERIDINE HYDROCHLORIDE 50 MG/ML
12.5 INJECTION INTRAMUSCULAR; INTRAVENOUS; SUBCUTANEOUS ONCE
Status: CANCELLED | OUTPATIENT
Start: 2019-12-02

## 2019-12-02 RX ORDER — SODIUM CHLORIDE 0.9 % (FLUSH) 0.9 %
5 SYRINGE (ML) INJECTION PRN
Status: CANCELLED | OUTPATIENT
Start: 2019-12-02

## 2019-12-02 RX ORDER — HEPARIN SODIUM (PORCINE) LOCK FLUSH IV SOLN 100 UNIT/ML 100 UNIT/ML
500 SOLUTION INTRAVENOUS PRN
Status: CANCELLED | OUTPATIENT
Start: 2019-12-23

## 2019-12-02 RX ORDER — DIPHENHYDRAMINE HYDROCHLORIDE 50 MG/ML
50 INJECTION INTRAMUSCULAR; INTRAVENOUS ONCE
Status: CANCELLED | OUTPATIENT
Start: 2019-12-02

## 2019-12-02 RX ORDER — EPINEPHRINE 1 MG/ML
0.3 INJECTION, SOLUTION, CONCENTRATE INTRAVENOUS PRN
Status: CANCELLED | OUTPATIENT
Start: 2019-12-23

## 2019-12-02 RX ORDER — SODIUM CHLORIDE 0.9 % (FLUSH) 0.9 %
10 SYRINGE (ML) INJECTION PRN
Status: CANCELLED | OUTPATIENT
Start: 2019-12-23

## 2019-12-02 RX ORDER — DIPHENHYDRAMINE HYDROCHLORIDE 50 MG/ML
50 INJECTION INTRAMUSCULAR; INTRAVENOUS ONCE
Status: CANCELLED | OUTPATIENT
Start: 2019-12-23

## 2019-12-02 RX ORDER — MEPERIDINE HYDROCHLORIDE 50 MG/ML
12.5 INJECTION INTRAMUSCULAR; INTRAVENOUS; SUBCUTANEOUS ONCE
Status: CANCELLED | OUTPATIENT
Start: 2019-12-23

## 2019-12-02 RX ORDER — SODIUM CHLORIDE 0.9 % (FLUSH) 0.9 %
5 SYRINGE (ML) INJECTION PRN
Status: CANCELLED | OUTPATIENT
Start: 2019-12-23

## 2019-12-02 RX ORDER — SODIUM CHLORIDE 9 MG/ML
20 INJECTION, SOLUTION INTRAVENOUS ONCE
Status: CANCELLED | OUTPATIENT
Start: 2019-12-23

## 2019-12-02 RX ORDER — SODIUM CHLORIDE 9 MG/ML
INJECTION, SOLUTION INTRAVENOUS CONTINUOUS
Status: CANCELLED | OUTPATIENT
Start: 2019-12-23

## 2019-12-02 RX ORDER — SODIUM CHLORIDE 0.9 % (FLUSH) 0.9 %
5 SYRINGE (ML) INJECTION PRN
Status: CANCELLED | OUTPATIENT
Start: 2020-01-13

## 2019-12-02 RX ORDER — SODIUM CHLORIDE 9 MG/ML
20 INJECTION, SOLUTION INTRAVENOUS ONCE
Status: COMPLETED | OUTPATIENT
Start: 2019-12-02 | End: 2019-12-02

## 2019-12-02 RX ORDER — HEPARIN SODIUM (PORCINE) LOCK FLUSH IV SOLN 100 UNIT/ML 100 UNIT/ML
500 SOLUTION INTRAVENOUS PRN
Status: CANCELLED | OUTPATIENT
Start: 2019-12-02

## 2019-12-02 RX ORDER — EPINEPHRINE 1 MG/ML
0.3 INJECTION, SOLUTION, CONCENTRATE INTRAVENOUS PRN
Status: CANCELLED | OUTPATIENT
Start: 2020-01-13

## 2019-12-02 RX ORDER — HEPARIN SODIUM (PORCINE) LOCK FLUSH IV SOLN 100 UNIT/ML 100 UNIT/ML
500 SOLUTION INTRAVENOUS PRN
Status: CANCELLED | OUTPATIENT
Start: 2020-01-13

## 2019-12-02 RX ORDER — METHYLPREDNISOLONE SODIUM SUCCINATE 125 MG/2ML
125 INJECTION, POWDER, LYOPHILIZED, FOR SOLUTION INTRAMUSCULAR; INTRAVENOUS ONCE
Status: CANCELLED | OUTPATIENT
Start: 2020-01-13

## 2019-12-02 RX ORDER — SODIUM CHLORIDE 9 MG/ML
INJECTION, SOLUTION INTRAVENOUS CONTINUOUS
Status: CANCELLED | OUTPATIENT
Start: 2019-12-02

## 2019-12-02 RX ORDER — SODIUM CHLORIDE 0.9 % (FLUSH) 0.9 %
10 SYRINGE (ML) INJECTION PRN
Status: CANCELLED | OUTPATIENT
Start: 2019-12-02

## 2019-12-02 RX ORDER — SODIUM CHLORIDE 9 MG/ML
20 INJECTION, SOLUTION INTRAVENOUS ONCE
Status: CANCELLED | OUTPATIENT
Start: 2019-12-02

## 2019-12-02 RX ORDER — SODIUM CHLORIDE 0.9 % (FLUSH) 0.9 %
10 SYRINGE (ML) INJECTION PRN
Status: CANCELLED | OUTPATIENT
Start: 2020-01-13

## 2019-12-02 RX ORDER — EPINEPHRINE 1 MG/ML
0.3 INJECTION, SOLUTION, CONCENTRATE INTRAVENOUS PRN
Status: CANCELLED | OUTPATIENT
Start: 2019-12-02

## 2019-12-02 RX ORDER — SODIUM CHLORIDE 9 MG/ML
20 INJECTION, SOLUTION INTRAVENOUS ONCE
Status: CANCELLED | OUTPATIENT
Start: 2020-01-13

## 2019-12-02 RX ORDER — METHYLPREDNISOLONE SODIUM SUCCINATE 125 MG/2ML
125 INJECTION, POWDER, LYOPHILIZED, FOR SOLUTION INTRAMUSCULAR; INTRAVENOUS ONCE
Status: CANCELLED | OUTPATIENT
Start: 2019-12-02

## 2019-12-02 RX ADMIN — SODIUM CHLORIDE 20 ML/HR: 9 INJECTION, SOLUTION INTRAVENOUS at 09:23

## 2019-12-02 RX ADMIN — ATEZOLIZUMAB 1200 MG: 1200 INJECTION, SOLUTION INTRAVENOUS at 09:26

## 2019-12-02 ASSESSMENT — PAIN SCALES - GENERAL: PAINLEVEL_OUTOF10: 10

## 2019-12-02 NOTE — PROGRESS NOTES
Patient here for C2D1 Tecentriq. Labs reviewed. He saw Dr. Agus Mercedes today see his dictation. He tolerated treatment well and was discharged home in stable condition with spouse. He is due to return 12/23 for C3D1.

## 2019-12-03 ENCOUNTER — TELEPHONE (OUTPATIENT)
Dept: INTERNAL MEDICINE CLINIC | Age: 84
End: 2019-12-03

## 2019-12-05 RX ORDER — GLIMEPIRIDE 2 MG/1
TABLET ORAL
Qty: 90 TABLET | Refills: 1 | Status: SHIPPED | OUTPATIENT
Start: 2019-12-05

## 2019-12-05 RX ORDER — SIMVASTATIN 20 MG
TABLET ORAL
Qty: 90 TABLET | Refills: 3 | Status: SHIPPED | OUTPATIENT
Start: 2019-12-05 | End: 2019-12-09 | Stop reason: SDUPTHER

## 2019-12-09 ENCOUNTER — OFFICE VISIT (OUTPATIENT)
Dept: UROLOGY | Age: 84
End: 2019-12-09
Payer: MEDICARE

## 2019-12-09 ENCOUNTER — HOSPITAL ENCOUNTER (OUTPATIENT)
Age: 84
Setting detail: SPECIMEN
Discharge: HOME | End: 2019-12-09
Payer: MEDICARE

## 2019-12-09 VITALS
TEMPERATURE: 98.3 F | WEIGHT: 165.34 LBS | DIASTOLIC BLOOD PRESSURE: 81 MMHG | HEART RATE: 80 BPM | SYSTOLIC BLOOD PRESSURE: 185 MMHG | HEIGHT: 73 IN | BODY MASS INDEX: 21.91 KG/M2

## 2019-12-09 DIAGNOSIS — R31.0 GROSS HEMATURIA: ICD-10-CM

## 2019-12-09 DIAGNOSIS — R30.0 DYSURIA: ICD-10-CM

## 2019-12-09 DIAGNOSIS — N13.30 BILATERAL HYDRONEPHROSIS: ICD-10-CM

## 2019-12-09 DIAGNOSIS — C67.9 MALIGNANT NEOPLASM OF URINARY BLADDER, UNSPECIFIED SITE (HCC): Primary | ICD-10-CM

## 2019-12-09 DIAGNOSIS — R39.89 BLADDER PAIN: ICD-10-CM

## 2019-12-09 PROCEDURE — G8420 CALC BMI NORM PARAMETERS: HCPCS | Performed by: UROLOGY

## 2019-12-09 PROCEDURE — 1123F ACP DISCUSS/DSCN MKR DOCD: CPT | Performed by: UROLOGY

## 2019-12-09 PROCEDURE — G8484 FLU IMMUNIZE NO ADMIN: HCPCS | Performed by: UROLOGY

## 2019-12-09 PROCEDURE — 1036F TOBACCO NON-USER: CPT | Performed by: UROLOGY

## 2019-12-09 PROCEDURE — G8428 CUR MEDS NOT DOCUMENT: HCPCS | Performed by: UROLOGY

## 2019-12-09 PROCEDURE — 99214 OFFICE O/P EST MOD 30 MIN: CPT | Performed by: UROLOGY

## 2019-12-09 PROCEDURE — 4040F PNEUMOC VAC/ADMIN/RCVD: CPT | Performed by: UROLOGY

## 2019-12-09 ASSESSMENT — ENCOUNTER SYMPTOMS
CONSTIPATION: 1
SHORTNESS OF BREATH: 0
EYE PAIN: 0
EYE REDNESS: 0
COLOR CHANGE: 0
BACK PAIN: 0
ABDOMINAL PAIN: 0
COUGH: 0
WHEEZING: 0
NAUSEA: 0
VOMITING: 0

## 2019-12-11 ENCOUNTER — OFFICE VISIT (OUTPATIENT)
Dept: INTERNAL MEDICINE CLINIC | Age: 84
End: 2019-12-11
Payer: MEDICARE

## 2019-12-11 ENCOUNTER — TELEPHONE (OUTPATIENT)
Dept: UROLOGY | Age: 84
End: 2019-12-11

## 2019-12-11 VITALS
DIASTOLIC BLOOD PRESSURE: 68 MMHG | BODY MASS INDEX: 21.2 KG/M2 | HEIGHT: 73 IN | OXYGEN SATURATION: 95 % | HEART RATE: 88 BPM | SYSTOLIC BLOOD PRESSURE: 132 MMHG | WEIGHT: 160 LBS

## 2019-12-11 DIAGNOSIS — K59.00 CONSTIPATION, UNSPECIFIED CONSTIPATION TYPE: ICD-10-CM

## 2019-12-11 DIAGNOSIS — N39.0 URINARY TRACT INFECTION WITH HEMATURIA, SITE UNSPECIFIED: ICD-10-CM

## 2019-12-11 DIAGNOSIS — M05.20 RHEUMATOID ARTERITIS (HCC): ICD-10-CM

## 2019-12-11 DIAGNOSIS — K59.01 SLOW TRANSIT CONSTIPATION: ICD-10-CM

## 2019-12-11 DIAGNOSIS — C67.9 MALIGNANT NEOPLASM OF URINARY BLADDER, UNSPECIFIED SITE (HCC): Primary | ICD-10-CM

## 2019-12-11 DIAGNOSIS — R31.9 URINARY TRACT INFECTION WITH HEMATURIA, SITE UNSPECIFIED: ICD-10-CM

## 2019-12-11 LAB
CULTURE: ABNORMAL
CULTURE: ABNORMAL
Lab: ABNORMAL
SPECIMEN DESCRIPTION: ABNORMAL

## 2019-12-11 PROCEDURE — G8484 FLU IMMUNIZE NO ADMIN: HCPCS | Performed by: INTERNAL MEDICINE

## 2019-12-11 PROCEDURE — G8427 DOCREV CUR MEDS BY ELIG CLIN: HCPCS | Performed by: INTERNAL MEDICINE

## 2019-12-11 PROCEDURE — 4040F PNEUMOC VAC/ADMIN/RCVD: CPT | Performed by: INTERNAL MEDICINE

## 2019-12-11 PROCEDURE — 1036F TOBACCO NON-USER: CPT | Performed by: INTERNAL MEDICINE

## 2019-12-11 PROCEDURE — 1123F ACP DISCUSS/DSCN MKR DOCD: CPT | Performed by: INTERNAL MEDICINE

## 2019-12-11 PROCEDURE — G8420 CALC BMI NORM PARAMETERS: HCPCS | Performed by: INTERNAL MEDICINE

## 2019-12-11 PROCEDURE — 99214 OFFICE O/P EST MOD 30 MIN: CPT | Performed by: INTERNAL MEDICINE

## 2019-12-11 RX ORDER — CIPROFLOXACIN 500 MG/1
500 TABLET, FILM COATED ORAL 2 TIMES DAILY
Qty: 20 TABLET | Refills: 0 | Status: ON HOLD | OUTPATIENT
Start: 2019-12-11 | End: 2019-12-15 | Stop reason: HOSPADM

## 2019-12-12 ENCOUNTER — APPOINTMENT (OUTPATIENT)
Dept: CT IMAGING | Age: 84
DRG: 175 | End: 2019-12-12
Payer: MEDICARE

## 2019-12-12 ENCOUNTER — HOSPITAL ENCOUNTER (INPATIENT)
Age: 84
LOS: 3 days | Discharge: HOSPICE | DRG: 175 | End: 2019-12-15
Attending: EMERGENCY MEDICINE | Admitting: INTERNAL MEDICINE
Payer: MEDICARE

## 2019-12-12 ENCOUNTER — APPOINTMENT (OUTPATIENT)
Dept: GENERAL RADIOLOGY | Age: 84
DRG: 175 | End: 2019-12-12
Payer: MEDICARE

## 2019-12-12 DIAGNOSIS — E83.52 HYPERCALCEMIA: ICD-10-CM

## 2019-12-12 DIAGNOSIS — N39.0 URINARY TRACT INFECTION ASSOCIATED WITH INDWELLING URETHRAL CATHETER, INITIAL ENCOUNTER (HCC): ICD-10-CM

## 2019-12-12 DIAGNOSIS — I26.94 MULTIPLE SUBSEGMENTAL PULMONARY EMBOLI WITHOUT ACUTE COR PULMONALE (HCC): Primary | ICD-10-CM

## 2019-12-12 DIAGNOSIS — T83.511A URINARY TRACT INFECTION ASSOCIATED WITH INDWELLING URETHRAL CATHETER, INITIAL ENCOUNTER (HCC): ICD-10-CM

## 2019-12-12 PROBLEM — I26.99 PULMONARY EMBOLISM ON RIGHT (HCC): Status: ACTIVE | Noted: 2019-12-12

## 2019-12-12 LAB
-: ABNORMAL
ABSOLUTE EOS #: 0.54 K/UL (ref 0–0.4)
ABSOLUTE IMMATURE GRANULOCYTE: ABNORMAL K/UL (ref 0–0.3)
ABSOLUTE LYMPH #: 0.82 K/UL (ref 1–4.8)
ABSOLUTE MONO #: 0.68 K/UL (ref 0.1–1.3)
ALBUMIN SERPL-MCNC: 3.4 G/DL (ref 3.5–5.2)
ALBUMIN/GLOBULIN RATIO: ABNORMAL (ref 1–2.5)
ALP BLD-CCNC: 126 U/L (ref 40–129)
ALT SERPL-CCNC: 21 U/L (ref 5–41)
AMMONIA: 27 UMOL/L (ref 16–60)
AMORPHOUS: ABNORMAL
ANION GAP SERPL CALCULATED.3IONS-SCNC: 10 MMOL/L (ref 9–17)
AST SERPL-CCNC: 22 U/L
BACTERIA: ABNORMAL
BASOPHILS # BLD: 1 % (ref 0–2)
BASOPHILS ABSOLUTE: 0.14 K/UL (ref 0–0.2)
BILIRUB SERPL-MCNC: 0.93 MG/DL (ref 0.3–1.2)
BILIRUBIN URINE: NEGATIVE
BUN BLDV-MCNC: 38 MG/DL (ref 8–23)
BUN/CREAT BLD: ABNORMAL (ref 9–20)
CALCIUM SERPL-MCNC: 14.3 MG/DL (ref 8.6–10.4)
CASTS UA: ABNORMAL /LPF
CHLORIDE BLD-SCNC: 99 MMOL/L (ref 98–107)
CO2: 31 MMOL/L (ref 20–31)
COLOR: YELLOW
COMMENT UA: ABNORMAL
CREAT SERPL-MCNC: 1.01 MG/DL (ref 0.7–1.2)
CRYSTALS, UA: ABNORMAL /HPF
D-DIMER QUANTITATIVE: >20 MG/L FEU (ref 0–0.59)
DIFFERENTIAL TYPE: ABNORMAL
DIRECT EXAM: NORMAL
EOSINOPHILS RELATIVE PERCENT: 4 % (ref 0–4)
EPITHELIAL CELLS UA: ABNORMAL /HPF
GFR AFRICAN AMERICAN: >60 ML/MIN
GFR NON-AFRICAN AMERICAN: >60 ML/MIN
GFR SERPL CREATININE-BSD FRML MDRD: ABNORMAL ML/MIN/{1.73_M2}
GFR SERPL CREATININE-BSD FRML MDRD: ABNORMAL ML/MIN/{1.73_M2}
GLUCOSE BLD-MCNC: 151 MG/DL (ref 70–99)
GLUCOSE URINE: NEGATIVE
HCT VFR BLD CALC: 32.5 % (ref 41–53)
HEMOGLOBIN: 10.2 G/DL (ref 13.5–17.5)
IMMATURE GRANULOCYTES: ABNORMAL %
INR BLD: 1.3
KETONES, URINE: NEGATIVE
LEUKOCYTE ESTERASE, URINE: ABNORMAL
LYMPHOCYTES # BLD: 6 % (ref 24–44)
Lab: NORMAL
MCH RBC QN AUTO: 27 PG (ref 26–34)
MCHC RBC AUTO-ENTMCNC: 31.4 G/DL (ref 31–37)
MCV RBC AUTO: 85.9 FL (ref 80–100)
MONOCYTES # BLD: 5 % (ref 1–7)
MORPHOLOGY: NORMAL
MUCUS: ABNORMAL
NITRITE, URINE: POSITIVE
NRBC AUTOMATED: ABNORMAL PER 100 WBC
OTHER OBSERVATIONS UA: ABNORMAL
PARTIAL THROMBOPLASTIN TIME: 32.7 SEC (ref 24–36)
PDW BLD-RTO: 16.1 % (ref 11.5–14.9)
PH UA: 7 (ref 5–8)
PLATELET # BLD: 218 K/UL (ref 150–450)
PLATELET ESTIMATE: ABNORMAL
PMV BLD AUTO: 10 FL (ref 6–12)
POTASSIUM SERPL-SCNC: 4.5 MMOL/L (ref 3.7–5.3)
PROTEIN UA: ABNORMAL
PROTHROMBIN TIME: 15.9 SEC (ref 11.8–14.6)
RBC # BLD: 3.79 M/UL (ref 4.5–5.9)
RBC # BLD: ABNORMAL 10*6/UL
RBC UA: ABNORMAL /HPF
RENAL EPITHELIAL, UA: ABNORMAL /HPF
SEG NEUTROPHILS: 84 % (ref 36–66)
SEGMENTED NEUTROPHILS ABSOLUTE COUNT: 11.42 K/UL (ref 1.3–9.1)
SODIUM BLD-SCNC: 140 MMOL/L (ref 135–144)
SPECIFIC GRAVITY UA: 1.02 (ref 1–1.03)
SPECIMEN DESCRIPTION: NORMAL
TOTAL PROTEIN: 6.6 G/DL (ref 6.4–8.3)
TRICHOMONAS: ABNORMAL
TROPONIN INTERP: ABNORMAL
TROPONIN INTERP: ABNORMAL
TROPONIN T: ABNORMAL NG/ML
TROPONIN T: ABNORMAL NG/ML
TROPONIN, HIGH SENSITIVITY: 29 NG/L (ref 0–22)
TROPONIN, HIGH SENSITIVITY: 33 NG/L (ref 0–22)
TSH SERPL DL<=0.05 MIU/L-ACNC: 4.15 MIU/L (ref 0.3–5)
TURBIDITY: ABNORMAL
URINE HGB: ABNORMAL
UROBILINOGEN, URINE: NORMAL
WBC # BLD: 13.6 K/UL (ref 3.5–11)
WBC # BLD: ABNORMAL 10*3/UL
WBC UA: ABNORMAL /HPF
YEAST: ABNORMAL

## 2019-12-12 PROCEDURE — 71260 CT THORAX DX C+: CPT

## 2019-12-12 PROCEDURE — 6370000000 HC RX 637 (ALT 250 FOR IP): Performed by: EMERGENCY MEDICINE

## 2019-12-12 PROCEDURE — 6360000004 HC RX CONTRAST MEDICATION: Performed by: EMERGENCY MEDICINE

## 2019-12-12 PROCEDURE — 85025 COMPLETE CBC W/AUTO DIFF WBC: CPT

## 2019-12-12 PROCEDURE — 81001 URINALYSIS AUTO W/SCOPE: CPT

## 2019-12-12 PROCEDURE — 85610 PROTHROMBIN TIME: CPT

## 2019-12-12 PROCEDURE — 87804 INFLUENZA ASSAY W/OPTIC: CPT

## 2019-12-12 PROCEDURE — 6360000002 HC RX W HCPCS: Performed by: EMERGENCY MEDICINE

## 2019-12-12 PROCEDURE — 96367 TX/PROPH/DG ADDL SEQ IV INF: CPT

## 2019-12-12 PROCEDURE — 2060000000 HC ICU INTERMEDIATE R&B

## 2019-12-12 PROCEDURE — 87186 SC STD MICRODIL/AGAR DIL: CPT

## 2019-12-12 PROCEDURE — 84443 ASSAY THYROID STIM HORMONE: CPT

## 2019-12-12 PROCEDURE — 74177 CT ABD & PELVIS W/CONTRAST: CPT

## 2019-12-12 PROCEDURE — 87088 URINE BACTERIA CULTURE: CPT

## 2019-12-12 PROCEDURE — 2580000003 HC RX 258: Performed by: EMERGENCY MEDICINE

## 2019-12-12 PROCEDURE — 82140 ASSAY OF AMMONIA: CPT

## 2019-12-12 PROCEDURE — 96365 THER/PROPH/DIAG IV INF INIT: CPT

## 2019-12-12 PROCEDURE — 84484 ASSAY OF TROPONIN QUANT: CPT

## 2019-12-12 PROCEDURE — 87077 CULTURE AEROBIC IDENTIFY: CPT

## 2019-12-12 PROCEDURE — 99285 EMERGENCY DEPT VISIT HI MDM: CPT

## 2019-12-12 PROCEDURE — 36415 COLL VENOUS BLD VENIPUNCTURE: CPT

## 2019-12-12 PROCEDURE — 71045 X-RAY EXAM CHEST 1 VIEW: CPT

## 2019-12-12 PROCEDURE — 85379 FIBRIN DEGRADATION QUANT: CPT

## 2019-12-12 PROCEDURE — 85730 THROMBOPLASTIN TIME PARTIAL: CPT

## 2019-12-12 PROCEDURE — 93005 ELECTROCARDIOGRAM TRACING: CPT | Performed by: EMERGENCY MEDICINE

## 2019-12-12 PROCEDURE — 70450 CT HEAD/BRAIN W/O DYE: CPT

## 2019-12-12 PROCEDURE — 80053 COMPREHEN METABOLIC PANEL: CPT

## 2019-12-12 PROCEDURE — 87086 URINE CULTURE/COLONY COUNT: CPT

## 2019-12-12 RX ORDER — 0.9 % SODIUM CHLORIDE 0.9 %
80 INTRAVENOUS SOLUTION INTRAVENOUS ONCE
Status: COMPLETED | OUTPATIENT
Start: 2019-12-12 | End: 2019-12-12

## 2019-12-12 RX ORDER — SODIUM CHLORIDE 0.9 % (FLUSH) 0.9 %
10 SYRINGE (ML) INJECTION ONCE
Status: COMPLETED | OUTPATIENT
Start: 2019-12-12 | End: 2019-12-12

## 2019-12-12 RX ORDER — HYDROCODONE BITARTRATE AND ACETAMINOPHEN 5; 325 MG/1; MG/1
1 TABLET ORAL ONCE
Status: COMPLETED | OUTPATIENT
Start: 2019-12-12 | End: 2019-12-12

## 2019-12-12 RX ORDER — POLYETHYLENE GLYCOL 3350 17 G/17G
17 POWDER, FOR SOLUTION ORAL DAILY
COMMUNITY

## 2019-12-12 RX ORDER — DOCUSATE SODIUM 100 MG/1
100 CAPSULE, LIQUID FILLED ORAL 2 TIMES DAILY
COMMUNITY

## 2019-12-12 RX ORDER — 0.9 % SODIUM CHLORIDE 0.9 %
500 INTRAVENOUS SOLUTION INTRAVENOUS ONCE
Status: COMPLETED | OUTPATIENT
Start: 2019-12-12 | End: 2019-12-12

## 2019-12-12 RX ADMIN — SODIUM CHLORIDE 500 ML: 9 INJECTION, SOLUTION INTRAVENOUS at 20:09

## 2019-12-12 RX ADMIN — Medication 10 ML: at 21:42

## 2019-12-12 RX ADMIN — IOVERSOL 75 ML: 741 INJECTION INTRA-ARTERIAL; INTRAVENOUS at 21:43

## 2019-12-12 RX ADMIN — SODIUM CHLORIDE 80 ML: 9 INJECTION, SOLUTION INTRAVENOUS at 21:42

## 2019-12-12 RX ADMIN — CEFEPIME HYDROCHLORIDE 2 G: 2 INJECTION, POWDER, FOR SOLUTION INTRAVENOUS at 22:34

## 2019-12-12 RX ADMIN — VANCOMYCIN HYDROCHLORIDE 1000 MG: 1 INJECTION, POWDER, LYOPHILIZED, FOR SOLUTION INTRAVENOUS at 23:22

## 2019-12-12 RX ADMIN — HYDROCODONE BITARTRATE AND ACETAMINOPHEN 1 TABLET: 5; 325 TABLET ORAL at 22:26

## 2019-12-12 ASSESSMENT — PAIN SCALES - GENERAL
PAINLEVEL_OUTOF10: 7
PAINLEVEL_OUTOF10: 3

## 2019-12-13 ENCOUNTER — APPOINTMENT (OUTPATIENT)
Dept: GENERAL RADIOLOGY | Age: 84
DRG: 175 | End: 2019-12-13
Payer: MEDICARE

## 2019-12-13 PROCEDURE — 6360000002 HC RX W HCPCS: Performed by: INTERNAL MEDICINE

## 2019-12-13 PROCEDURE — 99222 1ST HOSP IP/OBS MODERATE 55: CPT | Performed by: INTERNAL MEDICINE

## 2019-12-13 PROCEDURE — 6370000000 HC RX 637 (ALT 250 FOR IP): Performed by: INTERNAL MEDICINE

## 2019-12-13 PROCEDURE — 93970 EXTREMITY STUDY: CPT

## 2019-12-13 PROCEDURE — 74230 X-RAY XM SWLNG FUNCJ C+: CPT

## 2019-12-13 PROCEDURE — 92611 MOTION FLUOROSCOPY/SWALLOW: CPT

## 2019-12-13 PROCEDURE — 99223 1ST HOSP IP/OBS HIGH 75: CPT | Performed by: INTERNAL MEDICINE

## 2019-12-13 PROCEDURE — 2580000003 HC RX 258: Performed by: INTERNAL MEDICINE

## 2019-12-13 PROCEDURE — 94640 AIRWAY INHALATION TREATMENT: CPT

## 2019-12-13 PROCEDURE — 1200000000 HC SEMI PRIVATE

## 2019-12-13 PROCEDURE — 6360000002 HC RX W HCPCS: Performed by: EMERGENCY MEDICINE

## 2019-12-13 RX ORDER — FOLIC ACID 1 MG/1
1 TABLET ORAL NIGHTLY
Status: DISCONTINUED | OUTPATIENT
Start: 2019-12-13 | End: 2019-12-15 | Stop reason: HOSPADM

## 2019-12-13 RX ORDER — DEXTROSE MONOHYDRATE 50 MG/ML
100 INJECTION, SOLUTION INTRAVENOUS PRN
Status: DISCONTINUED | OUTPATIENT
Start: 2019-12-13 | End: 2019-12-15 | Stop reason: HOSPADM

## 2019-12-13 RX ORDER — POLYETHYLENE GLYCOL 3350 17 G/17G
17 POWDER, FOR SOLUTION ORAL DAILY
Status: DISCONTINUED | OUTPATIENT
Start: 2019-12-13 | End: 2019-12-13

## 2019-12-13 RX ORDER — IPRATROPIUM BROMIDE AND ALBUTEROL SULFATE 2.5; .5 MG/3ML; MG/3ML
1 SOLUTION RESPIRATORY (INHALATION)
Status: DISCONTINUED | OUTPATIENT
Start: 2019-12-13 | End: 2019-12-15 | Stop reason: HOSPADM

## 2019-12-13 RX ORDER — HYDROCODONE BITARTRATE AND ACETAMINOPHEN 5; 325 MG/1; MG/1
1 TABLET ORAL EVERY 8 HOURS PRN
Status: DISCONTINUED | OUTPATIENT
Start: 2019-12-13 | End: 2019-12-15 | Stop reason: HOSPADM

## 2019-12-13 RX ORDER — SODIUM CHLORIDE 0.9 % (FLUSH) 0.9 %
10 SYRINGE (ML) INJECTION PRN
Status: DISCONTINUED | OUTPATIENT
Start: 2019-12-13 | End: 2019-12-15 | Stop reason: HOSPADM

## 2019-12-13 RX ORDER — NICOTINE POLACRILEX 4 MG
15 LOZENGE BUCCAL PRN
Status: DISCONTINUED | OUTPATIENT
Start: 2019-12-13 | End: 2019-12-15 | Stop reason: HOSPADM

## 2019-12-13 RX ORDER — HYDROCODONE BITARTRATE AND ACETAMINOPHEN 5; 325 MG/1; MG/1
2 TABLET ORAL EVERY 4 HOURS PRN
Status: DISCONTINUED | OUTPATIENT
Start: 2019-12-13 | End: 2019-12-15 | Stop reason: HOSPADM

## 2019-12-13 RX ORDER — DOCUSATE SODIUM 100 MG/1
100 CAPSULE, LIQUID FILLED ORAL 2 TIMES DAILY
Status: DISCONTINUED | OUTPATIENT
Start: 2019-12-13 | End: 2019-12-15 | Stop reason: HOSPADM

## 2019-12-13 RX ORDER — LISINOPRIL 20 MG/1
20 TABLET ORAL NIGHTLY
Status: DISCONTINUED | OUTPATIENT
Start: 2019-12-13 | End: 2019-12-15 | Stop reason: HOSPADM

## 2019-12-13 RX ORDER — LEVOTHYROXINE SODIUM 0.03 MG/1
25 TABLET ORAL DAILY
Status: DISCONTINUED | OUTPATIENT
Start: 2019-12-13 | End: 2019-12-15 | Stop reason: HOSPADM

## 2019-12-13 RX ORDER — GLIMEPIRIDE 2 MG/1
2 TABLET ORAL
Status: DISCONTINUED | OUTPATIENT
Start: 2019-12-14 | End: 2019-12-15 | Stop reason: HOSPADM

## 2019-12-13 RX ORDER — SIMVASTATIN 20 MG
20 TABLET ORAL NIGHTLY
Status: DISCONTINUED | OUTPATIENT
Start: 2019-12-13 | End: 2019-12-15 | Stop reason: HOSPADM

## 2019-12-13 RX ORDER — ACETAMINOPHEN 325 MG/1
650 TABLET ORAL EVERY 4 HOURS PRN
Status: DISCONTINUED | OUTPATIENT
Start: 2019-12-13 | End: 2019-12-15 | Stop reason: HOSPADM

## 2019-12-13 RX ORDER — HYDROCODONE BITARTRATE AND ACETAMINOPHEN 5; 325 MG/1; MG/1
1 TABLET ORAL EVERY 4 HOURS PRN
Status: DISCONTINUED | OUTPATIENT
Start: 2019-12-13 | End: 2019-12-15 | Stop reason: HOSPADM

## 2019-12-13 RX ORDER — LISINOPRIL 20 MG/1
20 TABLET ORAL NIGHTLY
Status: DISCONTINUED | OUTPATIENT
Start: 2019-12-13 | End: 2019-12-13

## 2019-12-13 RX ORDER — LISINOPRIL 5 MG/1
5 TABLET ORAL NIGHTLY
Status: DISCONTINUED | OUTPATIENT
Start: 2019-12-13 | End: 2019-12-13

## 2019-12-13 RX ORDER — SODIUM CHLORIDE 0.9 % (FLUSH) 0.9 %
10 SYRINGE (ML) INJECTION EVERY 12 HOURS SCHEDULED
Status: DISCONTINUED | OUTPATIENT
Start: 2019-12-13 | End: 2019-12-15 | Stop reason: HOSPADM

## 2019-12-13 RX ORDER — LEVOFLOXACIN 5 MG/ML
500 INJECTION, SOLUTION INTRAVENOUS EVERY 24 HOURS
Status: DISCONTINUED | OUTPATIENT
Start: 2019-12-13 | End: 2019-12-13

## 2019-12-13 RX ORDER — DEXTROSE MONOHYDRATE 25 G/50ML
12.5 INJECTION, SOLUTION INTRAVENOUS PRN
Status: DISCONTINUED | OUTPATIENT
Start: 2019-12-13 | End: 2019-12-15 | Stop reason: HOSPADM

## 2019-12-13 RX ORDER — POLYETHYLENE GLYCOL 3350 17 G/17G
17 POWDER, FOR SOLUTION ORAL DAILY
Status: DISCONTINUED | OUTPATIENT
Start: 2019-12-13 | End: 2019-12-15 | Stop reason: HOSPADM

## 2019-12-13 RX ADMIN — APIXABAN 10 MG: 5 TABLET, FILM COATED ORAL at 10:44

## 2019-12-13 RX ADMIN — Medication 240 ML: at 22:10

## 2019-12-13 RX ADMIN — LISINOPRIL 20 MG: 20 TABLET ORAL at 12:18

## 2019-12-13 RX ADMIN — SIMVASTATIN 20 MG: 20 TABLET, FILM COATED ORAL at 21:05

## 2019-12-13 RX ADMIN — ENOXAPARIN SODIUM 70 MG: 80 INJECTION SUBCUTANEOUS at 00:14

## 2019-12-13 RX ADMIN — DOCUSATE SODIUM 100 MG: 100 CAPSULE, LIQUID FILLED ORAL at 21:04

## 2019-12-13 RX ADMIN — APIXABAN 10 MG: 5 TABLET, FILM COATED ORAL at 21:04

## 2019-12-13 RX ADMIN — CARBIDOPA AND LEVODOPA 2 TABLET: 25; 100 TABLET ORAL at 22:09

## 2019-12-13 RX ADMIN — DOCUSATE SODIUM 100 MG: 100 CAPSULE, LIQUID FILLED ORAL at 10:45

## 2019-12-13 RX ADMIN — IPRATROPIUM BROMIDE AND ALBUTEROL SULFATE 1 AMPULE: .5; 3 SOLUTION RESPIRATORY (INHALATION) at 21:31

## 2019-12-13 RX ADMIN — LEVOFLOXACIN 500 MG: 5 INJECTION, SOLUTION INTRAVENOUS at 10:44

## 2019-12-13 RX ADMIN — Medication 10 ML: at 21:14

## 2019-12-13 RX ADMIN — LEVOTHYROXINE SODIUM 25 MCG: 25 TABLET ORAL at 10:44

## 2019-12-13 RX ADMIN — FOLIC ACID 1 MG: 1 TABLET ORAL at 21:05

## 2019-12-13 RX ADMIN — CARBIDOPA AND LEVODOPA 2 TABLET: 25; 100 TABLET ORAL at 10:44

## 2019-12-13 RX ADMIN — PIPERACILLIN SODIUM AND TAZOBACTAM SODIUM 3.38 G: 3; .375 INJECTION, POWDER, LYOPHILIZED, FOR SOLUTION INTRAVENOUS at 21:11

## 2019-12-13 RX ADMIN — POLYETHYLENE GLYCOL 3350 17 G: 17 POWDER, FOR SOLUTION ORAL at 10:44

## 2019-12-13 ASSESSMENT — ENCOUNTER SYMPTOMS
CONSTIPATION: 0
COLOR CHANGE: 0
DIARRHEA: 0
COUGH: 0
ALLERGIC/IMMUNOLOGIC NEGATIVE: 1
EYES NEGATIVE: 1
SHORTNESS OF BREATH: 1
ABDOMINAL PAIN: 0
SINUS PRESSURE: 0
SHORTNESS OF BREATH: 0
WHEEZING: 0
SORE THROAT: 0
CHEST TIGHTNESS: 0
STRIDOR: 0
VOMITING: 0
BACK PAIN: 0
GASTROINTESTINAL NEGATIVE: 1
NAUSEA: 0
PHOTOPHOBIA: 0
APNEA: 0

## 2019-12-13 ASSESSMENT — PAIN DESCRIPTION - ONSET: ONSET: UNABLE TO ASSESS

## 2019-12-13 ASSESSMENT — PAIN DESCRIPTION - LOCATION: LOCATION: CHEST

## 2019-12-13 ASSESSMENT — PAIN DESCRIPTION - PAIN TYPE: TYPE: ACUTE PAIN

## 2019-12-13 ASSESSMENT — PAIN DESCRIPTION - FREQUENCY: FREQUENCY: INTERMITTENT

## 2019-12-13 ASSESSMENT — PAIN DESCRIPTION - ORIENTATION: ORIENTATION: RIGHT

## 2019-12-13 ASSESSMENT — PAIN DESCRIPTION - DESCRIPTORS: DESCRIPTORS: ACHING

## 2019-12-13 ASSESSMENT — PAIN SCALES - GENERAL: PAINLEVEL_OUTOF10: 0

## 2019-12-13 ASSESSMENT — PAIN - FUNCTIONAL ASSESSMENT: PAIN_FUNCTIONAL_ASSESSMENT: PREVENTS OR INTERFERES SOME ACTIVE ACTIVITIES AND ADLS

## 2019-12-13 ASSESSMENT — PAIN DESCRIPTION - PROGRESSION: CLINICAL_PROGRESSION: GRADUALLY WORSENING

## 2019-12-14 PROBLEM — B95.2 ENTEROCOCCUS UTI: Status: ACTIVE | Noted: 2019-12-14

## 2019-12-14 PROBLEM — J69.0 ASPIRATION PNEUMONIA OF BOTH LUNGS (HCC): Status: ACTIVE | Noted: 2019-12-14

## 2019-12-14 PROBLEM — N39.0 UTI DUE TO KLEBSIELLA SPECIES: Status: ACTIVE | Noted: 2019-12-14

## 2019-12-14 PROBLEM — I26.94 MULTIPLE SUBSEGMENTAL PULMONARY EMBOLI WITHOUT ACUTE COR PULMONALE (HCC): Status: ACTIVE | Noted: 2019-12-12

## 2019-12-14 PROBLEM — N39.0 ENTEROCOCCUS UTI: Status: ACTIVE | Noted: 2019-12-14

## 2019-12-14 PROBLEM — I82.452 ACUTE DEEP VEIN THROMBOSIS (DVT) OF LEFT PERONEAL VEIN (HCC): Status: ACTIVE | Noted: 2019-12-14

## 2019-12-14 PROBLEM — J96.01 ACUTE RESPIRATORY FAILURE WITH HYPOXIA (HCC): Status: ACTIVE | Noted: 2019-12-14

## 2019-12-14 PROBLEM — B96.89 UTI DUE TO KLEBSIELLA SPECIES: Status: ACTIVE | Noted: 2019-12-14

## 2019-12-14 LAB
CULTURE: ABNORMAL
EKG ATRIAL RATE: 72 BPM
EKG P AXIS: 82 DEGREES
EKG P-R INTERVAL: 204 MS
EKG Q-T INTERVAL: 382 MS
EKG QRS DURATION: 142 MS
EKG QTC CALCULATION (BAZETT): 418 MS
EKG R AXIS: 59 DEGREES
EKG T AXIS: 40 DEGREES
EKG VENTRICULAR RATE: 72 BPM
GLUCOSE BLD-MCNC: 101 MG/DL (ref 75–110)
GLUCOSE BLD-MCNC: 108 MG/DL (ref 75–110)
GLUCOSE BLD-MCNC: 115 MG/DL (ref 75–110)
GLUCOSE BLD-MCNC: 116 MG/DL (ref 75–110)
GLUCOSE BLD-MCNC: 141 MG/DL (ref 75–110)
LV EF: 55 %
LVEF MODALITY: NORMAL
Lab: ABNORMAL
SPECIMEN DESCRIPTION: ABNORMAL

## 2019-12-14 PROCEDURE — 6370000000 HC RX 637 (ALT 250 FOR IP): Performed by: INTERNAL MEDICINE

## 2019-12-14 PROCEDURE — 2580000003 HC RX 258: Performed by: INTERNAL MEDICINE

## 2019-12-14 PROCEDURE — 2700000000 HC OXYGEN THERAPY PER DAY

## 2019-12-14 PROCEDURE — 93306 TTE W/DOPPLER COMPLETE: CPT

## 2019-12-14 PROCEDURE — 99232 SBSQ HOSP IP/OBS MODERATE 35: CPT | Performed by: INTERNAL MEDICINE

## 2019-12-14 PROCEDURE — 94761 N-INVAS EAR/PLS OXIMETRY MLT: CPT

## 2019-12-14 PROCEDURE — 1200000000 HC SEMI PRIVATE

## 2019-12-14 PROCEDURE — 6360000002 HC RX W HCPCS: Performed by: INTERNAL MEDICINE

## 2019-12-14 PROCEDURE — 94640 AIRWAY INHALATION TREATMENT: CPT

## 2019-12-14 PROCEDURE — 82947 ASSAY GLUCOSE BLOOD QUANT: CPT

## 2019-12-14 PROCEDURE — 93010 ELECTROCARDIOGRAM REPORT: CPT | Performed by: INTERNAL MEDICINE

## 2019-12-14 RX ORDER — CIPROFLOXACIN 2 MG/ML
400 INJECTION, SOLUTION INTRAVENOUS EVERY 12 HOURS
Status: DISCONTINUED | OUTPATIENT
Start: 2019-12-14 | End: 2019-12-15 | Stop reason: HOSPADM

## 2019-12-14 RX ADMIN — APIXABAN 10 MG: 5 TABLET, FILM COATED ORAL at 20:27

## 2019-12-14 RX ADMIN — LINACLOTIDE 145 MCG: 145 CAPSULE, GELATIN COATED ORAL at 05:58

## 2019-12-14 RX ADMIN — LISINOPRIL 20 MG: 20 TABLET ORAL at 20:27

## 2019-12-14 RX ADMIN — PIPERACILLIN SODIUM AND TAZOBACTAM SODIUM 3.38 G: 3; .375 INJECTION, POWDER, LYOPHILIZED, FOR SOLUTION INTRAVENOUS at 12:31

## 2019-12-14 RX ADMIN — SIMVASTATIN 20 MG: 20 TABLET, FILM COATED ORAL at 20:27

## 2019-12-14 RX ADMIN — GLIMEPIRIDE 2 MG: 2 TABLET ORAL at 08:55

## 2019-12-14 RX ADMIN — IPRATROPIUM BROMIDE AND ALBUTEROL SULFATE 1 AMPULE: .5; 3 SOLUTION RESPIRATORY (INHALATION) at 12:08

## 2019-12-14 RX ADMIN — Medication 10 ML: at 20:38

## 2019-12-14 RX ADMIN — IPRATROPIUM BROMIDE AND ALBUTEROL SULFATE 1 AMPULE: .5; 3 SOLUTION RESPIRATORY (INHALATION) at 19:29

## 2019-12-14 RX ADMIN — CIPROFLOXACIN 400 MG: 2 INJECTION, SOLUTION INTRAVENOUS at 10:14

## 2019-12-14 RX ADMIN — FOLIC ACID 1 MG: 1 TABLET ORAL at 20:28

## 2019-12-14 RX ADMIN — APIXABAN 10 MG: 5 TABLET, FILM COATED ORAL at 08:55

## 2019-12-14 RX ADMIN — CARBIDOPA AND LEVODOPA 2 TABLET: 25; 100 TABLET ORAL at 20:28

## 2019-12-14 RX ADMIN — DOCUSATE SODIUM 100 MG: 100 CAPSULE, LIQUID FILLED ORAL at 08:55

## 2019-12-14 RX ADMIN — CIPROFLOXACIN 400 MG: 2 INJECTION, SOLUTION INTRAVENOUS at 23:13

## 2019-12-14 RX ADMIN — CARBIDOPA AND LEVODOPA 2 TABLET: 25; 100 TABLET ORAL at 09:00

## 2019-12-14 RX ADMIN — PIPERACILLIN SODIUM AND TAZOBACTAM SODIUM 3.38 G: 3; .375 INJECTION, POWDER, LYOPHILIZED, FOR SOLUTION INTRAVENOUS at 19:24

## 2019-12-14 RX ADMIN — IPRATROPIUM BROMIDE AND ALBUTEROL SULFATE 1 AMPULE: .5; 3 SOLUTION RESPIRATORY (INHALATION) at 07:42

## 2019-12-14 RX ADMIN — PIPERACILLIN SODIUM AND TAZOBACTAM SODIUM 3.38 G: 3; .375 INJECTION, POWDER, LYOPHILIZED, FOR SOLUTION INTRAVENOUS at 04:27

## 2019-12-14 RX ADMIN — LEVOTHYROXINE SODIUM 25 MCG: 25 TABLET ORAL at 05:58

## 2019-12-14 RX ADMIN — DOCUSATE SODIUM 100 MG: 100 CAPSULE, LIQUID FILLED ORAL at 20:27

## 2019-12-14 RX ADMIN — METHOTREXATE 15 MG: 2.5 TABLET ORAL at 12:30

## 2019-12-14 ASSESSMENT — ENCOUNTER SYMPTOMS
GASTROINTESTINAL NEGATIVE: 1
COUGH: 0
STRIDOR: 0
WHEEZING: 0
APNEA: 0
CHEST TIGHTNESS: 0
SHORTNESS OF BREATH: 1
EYES NEGATIVE: 1
ALLERGIC/IMMUNOLOGIC NEGATIVE: 1

## 2019-12-15 VITALS
RESPIRATION RATE: 18 BRPM | OXYGEN SATURATION: 95 % | HEIGHT: 73 IN | SYSTOLIC BLOOD PRESSURE: 164 MMHG | BODY MASS INDEX: 21.87 KG/M2 | WEIGHT: 165 LBS | DIASTOLIC BLOOD PRESSURE: 62 MMHG | TEMPERATURE: 98.1 F | HEART RATE: 64 BPM

## 2019-12-15 LAB — GLUCOSE BLD-MCNC: 82 MG/DL (ref 75–110)

## 2019-12-15 PROCEDURE — 2700000000 HC OXYGEN THERAPY PER DAY

## 2019-12-15 PROCEDURE — 6360000002 HC RX W HCPCS: Performed by: INTERNAL MEDICINE

## 2019-12-15 PROCEDURE — 2580000003 HC RX 258: Performed by: INTERNAL MEDICINE

## 2019-12-15 PROCEDURE — 99239 HOSP IP/OBS DSCHRG MGMT >30: CPT | Performed by: INTERNAL MEDICINE

## 2019-12-15 PROCEDURE — 6370000000 HC RX 637 (ALT 250 FOR IP): Performed by: INTERNAL MEDICINE

## 2019-12-15 PROCEDURE — 82947 ASSAY GLUCOSE BLOOD QUANT: CPT

## 2019-12-15 PROCEDURE — 94761 N-INVAS EAR/PLS OXIMETRY MLT: CPT

## 2019-12-15 PROCEDURE — 94640 AIRWAY INHALATION TREATMENT: CPT

## 2019-12-15 RX ORDER — CIPROFLOXACIN 500 MG/1
500 TABLET, FILM COATED ORAL 2 TIMES DAILY
Qty: 8 TABLET | Refills: 0 | Status: SHIPPED | OUTPATIENT
Start: 2019-12-15 | End: 2019-12-19

## 2019-12-15 RX ORDER — LEVOFLOXACIN 500 MG/1
500 TABLET, FILM COATED ORAL DAILY
Qty: 5 TABLET | Refills: 0 | Status: SHIPPED | OUTPATIENT
Start: 2019-12-15 | End: 2019-12-15 | Stop reason: HOSPADM

## 2019-12-15 RX ORDER — AMOXICILLIN AND CLAVULANATE POTASSIUM 875; 125 MG/1; MG/1
1 TABLET, FILM COATED ORAL 2 TIMES DAILY
Qty: 8 TABLET | Refills: 0 | Status: SHIPPED | OUTPATIENT
Start: 2019-12-15 | End: 2019-12-19

## 2019-12-15 RX ADMIN — HYDROCODONE BITARTRATE AND ACETAMINOPHEN 1 TABLET: 5; 325 TABLET ORAL at 10:06

## 2019-12-15 RX ADMIN — DOCUSATE SODIUM 100 MG: 100 CAPSULE, LIQUID FILLED ORAL at 10:17

## 2019-12-15 RX ADMIN — APIXABAN 10 MG: 5 TABLET, FILM COATED ORAL at 10:09

## 2019-12-15 RX ADMIN — LINACLOTIDE 145 MCG: 145 CAPSULE, GELATIN COATED ORAL at 10:16

## 2019-12-15 RX ADMIN — CARBIDOPA AND LEVODOPA 2 TABLET: 25; 100 TABLET ORAL at 10:17

## 2019-12-15 RX ADMIN — PIPERACILLIN SODIUM AND TAZOBACTAM SODIUM 3.38 G: 3; .375 INJECTION, POWDER, LYOPHILIZED, FOR SOLUTION INTRAVENOUS at 03:25

## 2019-12-15 RX ADMIN — LEVOTHYROXINE SODIUM 25 MCG: 25 TABLET ORAL at 06:28

## 2019-12-15 RX ADMIN — IPRATROPIUM BROMIDE AND ALBUTEROL SULFATE 1 AMPULE: .5; 3 SOLUTION RESPIRATORY (INHALATION) at 07:40

## 2019-12-15 RX ADMIN — POLYETHYLENE GLYCOL 3350 17 G: 17 POWDER, FOR SOLUTION ORAL at 10:16

## 2019-12-15 ASSESSMENT — PAIN SCALES - GENERAL: PAINLEVEL_OUTOF10: 7

## 2019-12-20 DIAGNOSIS — Z79.899 LONG TERM USE OF DRUG: Primary | ICD-10-CM

## 2019-12-20 DIAGNOSIS — C67.9 MALIGNANT NEOPLASM OF URINARY BLADDER, UNSPECIFIED SITE (HCC): ICD-10-CM

## 2019-12-23 ENCOUNTER — HOSPITAL ENCOUNTER (OUTPATIENT)
Dept: INFUSION THERAPY | Age: 84
End: 2019-12-23
Payer: MEDICARE

## 2019-12-24 ASSESSMENT — ENCOUNTER SYMPTOMS
SHORTNESS OF BREATH: 1
CONSTIPATION: 0
ABDOMINAL DISTENTION: 0
COUGH: 1
WHEEZING: 0
FACIAL SWELLING: 0
DIARRHEA: 0
ABDOMINAL PAIN: 0
APNEA: 0
BACK PAIN: 0
COLOR CHANGE: 0
CHEST TIGHTNESS: 0

## 2020-01-13 ENCOUNTER — HOSPITAL ENCOUNTER (OUTPATIENT)
Dept: INFUSION THERAPY | Age: 85
End: 2020-01-13

## 2021-04-19 NOTE — ANESTHESIA PRE PROCEDURE
by In Vitro route daily 1/26/16   Patrica Stacy MD   Lancets MISC 1 each by Does not apply route daily 1/26/16   Patrica Stacy MD   glucose monitoring kit (FREESTYLE) monitoring kit 1 kit by Does not apply route daily as needed 1/26/16   Patrica Stacy MD   Insulin Pen Needle (PEN NEEDLES 31GX5/16\") 31G X 8 MM MISC 1 each by Does not apply route 2 times daily.     Historical Provider, MD       Current medications:    Current Facility-Administered Medications   Medication Dose Route Frequency Provider Last Rate Last Dose    lactated ringers infusion 1,000 mL  1,000 mL Intravenous Continuous Gail Godfrey MD           Allergies:  No Known Allergies    Problem List:    Patient Active Problem List   Diagnosis Code    Type 2 diabetes mellitus with neurologic complication ([facility]) J26.03    Diabetic polyneuropathy ([facility]) E11.42    Disorder of arteries and arterioles ([facility]) I77.9    Memory loss R41.3    Tongue cancer ([facility] C02.9    Anorexia R63.0    Weight loss R63.4    Anxiety F41.9    Rash R21    Oral mucositis K12.30    Bradycardia R00.1    Chronic combined systolic and diastolic congestive heart failure ([facility]) I50.42    History of tobacco abuse Z87.891    Rheumatoid arteritis I00    Slow transit constipation K59.01    Orthostatic hypotension I95.1       Past Medical History:        Diagnosis Date    Diabetic neuropathy ([address]     Full dentures     Upper & Lower    Hematuria 03/2018    Hyperlipidemia     Hypertension     Malignant neoplasm of urinary bladder ([facility] 03/2018    Tongue cancer ([facility] 11/2014    mets to lymph nodes, CHEMO AND RADIATION    Type II or unspecified type diabetes mellitus without mention of complication, not stated as uncontrolled     on Insulin    Wears glasses     Wears glasses        Past Surgical History:        Procedure Laterality Date    APPENDECTOMY  '70's    BRONCHOSCOPY      CHOLECYSTECTOMY  1981    CLAVICLE SURGERY Right 1949    pins placed and removed Infliximab Counseling:  I discussed with the patient the risks of infliximab including but not limited to myelosuppression, immunosuppression, autoimmune hepatitis, demyelinating diseases, lymphoma, and serious infections.  The patient understands that monitoring is required including a PPD at baseline and must alert us or the primary physician if symptoms of infection or other concerning signs are noted.

## 2022-08-31 NOTE — OP NOTE
Operative Note    NAME: Teo Dela Cruz   MRN: [id number]  : 1932  PROCEDURE DATE: 19    Surgeon: Syl Grant MD    Resident(s): Levi Kapadia MD-PGY2    Pre-op Diagnosis: Bladder tumor    Post-op Diagnosis: Bladder tumors (posterior wall, floor, right bladder neck), meatal stenosis, balanitis      Procedure:   1. Rigid cystourethroscopy. 2. Urethral dilation using Razia Antes sounds. 2. Transurethral resection of bladder tumor - MEDIUM    Anesthesia: General endotracheal     Antibiotics: 2000 mg IV Ancef         Indications:   Teo Dela Cruz is a 80 y.o. male who presents with a bladder tumor. Patient offered transurethral resection for diagnosis and removal. Risks, alternatives, and benefits were discussed and the patient elected to proceed. Informed consent was obtained. Findings: Medium-sized bladder tumor on the posterior wall with additional tumor at the floor and right bladder neck,     Procedure details: The patient was brought back from the preoperative holding area to the operating room, and was transferred to the operating table. General anesthesia was induced appropriately. Preoperative antibiotics were given and EPC cuffs were placed and confirmed to be working. The patient was placed in dorsal lithotomy position and prepped and draped in the usual sterile fashion. Surgical timeout confirming patient, procedure, and positioning was performed. On initial exam, the patient was noted to have an uncircumcised penis with underlying balanitis and meatal stenosis. We used Razia Antes sounds to dilate the meatus to 28-Beninese. We then advanced a 26-Beninese visual obturator within the urethra and into the bladder. A pan-cystoscopy was performed and showed tumor at the posterior bladder wall, enlarged from previous endoscopy. There was also noted to be additional tumor at the floor of the bladder and right bladder neck. We switched to a resectoscope.  We then resected the tumor at the Writer spoke with patient who indicated that he had time to answer new patient questions.  Partway into the new patient questions patient asked if we could call back around 3-3:30 pm today as he was at work.  Writer indicated we would return call to patient at that time.

## 2022-10-11 NOTE — H&P
H&P Update    Patient's History and Physical from April 15, 2019 was reviewed. Patient examined. There has been no change.     Lorenzo Shaver Cantharidin Pregnancy And Lactation Text: The use of this medication during pregnancy or lactation is not recommended as there is insufficient data.

## 2023-04-25 NOTE — ED PROVIDER NOTES
EMERGENCY DEPARTMENT ENCOUNTER    Pt Name: Leticia Packer  MRN: [id number]  [place] 12/11/1932  Date of evaluation: 5/3/19  CHIEF COMPLAINT       Chief Complaint   Patient presents with    Post-op Problem    Urinary Retention     HISTORY OF PRESENT ILLNESS     Abdominal Pain   Pain location:  Suprapubic  Pain quality: aching    Pain radiates to:  Does not radiate  Pain severity:  Moderate  Onset quality:  Gradual  Duration:  1 day  Timing:  Constant  Progression:  Worsening  Context comment:  Had curtis removed from Dr. Jamaal Landon office urology yesterday, hasn't urinated yesterday  Relieved by:  Nothing  Worsened by:  Nothing  Ineffective treatments:  None tried  Associated symptoms comment:  Cannot urinate  had cystoscope three days ago and laser ablation of prostate, curtis removed in office yesterday, hasn't urinated  Here with wife    REVIEW OF SYSTEMS     Review of Systems   Gastrointestinal: Positive for abdominal pain. All other systems reviewed and are negative.     PASTMEDICAL HISTORY     Past Medical History:   Diagnosis Date    Arthritis     Diabetic neuropathy [facility] [address]     First degree AV block     Full dentures     Upper & Lower    H/O dizziness     being worked up by [facility] neurology    Hematuria 03/2018    Hyperlipidemia     Hypertension     Malignant neoplasm of urinary bladder ([facility] [address] 03/2018    RBBB     Dr. 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, CHEMO AND RADIATION    Type II or unspecified type diabetes mellitus without mention of complication, not stated as uncontrolled     on Insulin    Wears glasses      SURGICAL HISTORY       Past Surgical History:   Procedure Laterality Date    APPENDECTOMY  '70's    BRONCHOSCOPY      CHOLECYSTECTOMY  1981    CLAVICLE SURGERY Right 1949    pins placed and removed    COLONOSCOPY      several polypectomy    CYSTOSCOPY  03/23/2018    CYSTOSCOPY  02/22/2019    turbt    CYSTOSCOPY N/A 2/22/2019    CYSTOSCOPY, Daquan Alfredo Last office visit: 23    Per note from 23: Continue Lyrica 150mg BID (preferred to TID dosing/ADR at TID dosing); 90d refill provided today    PDMP reviewed:  Pregabalin  150MG / Capsule  Rx# [id number] Other Qty: 180  Days: 90  Refills: 0 Prescribed: 2023  Dispensed: 2023  Sold: Unavailable FRANKLIN VALENCIA  [address]  [facility] [address] [facility]  [address] [place] [id number] ROSE MANZO  : [phone] [facility] [address]  Pay Type: Commercial Insurance         Last fill date: 23  # dispensed: 180 (90 days)  Due for refill: 23  Next scheduled OV: None scheduled - per LOV note: Return in about 6 months (around 2023) for or sooner if interested in SCS or with worsening pain.    Provided 90-day RX (which would get patient to )  with instructions to schedule follow-up    Refill placed for provider to sign.     TUMOR, GYRUS , URETHRAL DILATATION performed by Ashley Garza MD at [facility], COLON, [address]  11/25/2014    with EGD;  removed    [place] OFFICE/OUTPT VISIT,PROCEDURE ONLY N/A 3/23/2018    CYSTOSCOPY, TUR BLADDER TUMOR WITH GYRUS performed by Ashley Garza MD at [address] [facility]  11/2014    chemo Tx.  TONSILLECTOMY      TUNNELED VENOUS PORT PLACEMENT Left 11/17/14    chemo port insertion; left chest    TURP  03/23/2018    TURP  05/01/2019    cystoscopy    TURP N/A 5/1/2019    CYSTOSCOPY, TUR-PROSTATE GREENLIGHT XPS LASER performed by Ashley Garza MD at [address]      umbilical repair     CURRENT MEDICATIONS       Current Discharge Medication List      CONTINUE these medications which have NOT CHANGED    Details   ciprofloxacin (CIPRO) 500 MG tablet Take 1 tablet by mouth 2 times daily for 3 days  Qty: 6 tablet, Refills: 0      glimepiride (AMARYL) 2 MG tablet Take 1 tablet by mouth every morning (before breakfast)  Qty: 90 tablet, Refills: 1    Comments: Please consider 90 day supplies to promote better adherence      Multiple Vitamins-Minerals (CENTRUM SILVER PO) Take 1 tablet by mouth daily      clotrimazole-betamethasone (LOTRISONE) 1-0.05 % cream Apply topically to head of penis (retract foreskin) once a day. Qty: 1 Tube, Refills: 0      simvastatin (ZOCOR) 20 MG tablet TAKE ONE TABLET BY MOUTH ONCE DAILY  Qty: 90 tablet, Refills: 3      tamsulosin (FLOMAX) 0.4 MG capsule TAKE 1 CAPSULE BY MOUTH ONCE DAILY WITH  SUPPER  Qty: 90 capsule, Refills: 3      methotrexate (RHEUMATREX) 2.5 MG chemo tablet TK 6 TS PO ONCE A WEEK  Refills: 2      folic acid (FOLVITE) 1 MG tablet Take 1 mg by mouth Daily with supper      lisinopril (PRINIVIL;ZESTRIL) 5 MG tablet Take 5 mg by mouth Daily with supper       HYDROcodone-acetaminophen (NORCO) 5-325 MG per tablet Take 1 tablet by mouth every 6 hours as needed for Pain for up to 5 days. May take 2 tablets if needed  Qty: 20 tablet, Refills: 0    Comments: Reduce doses taken as pain becomes manageable  Associated Diagnoses: Post-operative pain      Ascorbic Acid (VITAMIN C) 500 MG CHEW Take 500 mg by mouth daily      blood glucose test strips (TRUETEST TEST) strip 1 each by In Vitro route daily  Qty: 100 each, Refills: 3    Comments: Please give associated strips for use with True Test glucometer  Associated Diagnoses: Type 2 diabetes mellitus with diabetic polyneuropathy, with long-term current use of insulin ([address] Insulin dependent type 2 diabetes mellitus, controlled ([facility])      Lancets MISC 1 each by Does not apply route daily  Qty: 100 each, Refills: 3    Comments: Please give associated lancets for use with TrueTest glucometer  Associated Diagnoses: Type 2 diabetes mellitus with diabetic polyneuropathy ([address] Insulin dependent type 2 diabetes mellitus, controlled ([facility])      glucose monitoring kit (FREESTYLE) monitoring kit 1 kit by Does not apply route daily as needed  Qty: 1 kit, Refills: 0    Comments: Please give True Test glucometer  Associated Diagnoses: Type 2 diabetes mellitus with diabetic polyneuropathy ([address] Insulin dependent type 2 diabetes mellitus, controlled ([facility])      Insulin Pen Needle (PEN NEEDLES 31GX5/16\") 31G X 8 MM MISC 1 each by Does not apply route 2 times daily. ALLERGIES     has No Known Allergies. FAMILY HISTORY     indicated that his mother is . He indicated that his father is . He indicated that his maternal grandmother is . He indicated that his maternal grandfather is . He indicated that his paternal grandmother is . He indicated that his paternal grandfather is . He indicated that his son is alive.      SOCIAL HISTORY       Social History     Tobacco Use    Smoking status: Former Smoker     Packs/day: 2.00     Types: Cigarettes     Start date:      Last attempt to quit: 1991     Years since quittin.4    Smokeless tobacco: Never Used   Substance Use Topics    Alcohol use: No     Alcohol/week: 0.0 oz    Drug use: No     PHYSICAL EXAM     INITIAL VITALS: BP (!) 169/78   Pulse 71   Temp 97.5 °F (36.4 °C) (Oral)   Resp 16   Ht 6' 0.5\" (1.842 m)   Wt 177 lb (80.3 kg)   SpO2 100%   BMI 23.68 kg/m²    Physical Exam   Constitutional: He is oriented to person, place, and time. He appears well-developed and well-nourished. No distress. HENT:   Head: Normocephalic and atraumatic. Nose: Nose normal.   Eyes: Pupils are equal, round, and reactive to light. Conjunctivae and EOM are normal. Right eye exhibits no discharge. Left eye exhibits no discharge. Neck: Normal range of motion. Neck supple. No tracheal deviation present. Cardiovascular: Normal rate, regular rhythm, normal heart sounds and intact distal pulses. Pulmonary/Chest: Effort normal and breath sounds normal. No stridor. No respiratory distress. He has no wheezes. He has no rales. He exhibits no tenderness. Abdominal: Soft. Bowel sounds are normal. There is no tenderness. There is no rebound and no guarding. Genitourinary:   Genitourinary Comments: Distended bladder, reducible right inguinal hernia, not circumcised, testicles nontender without edema or erythema   Musculoskeletal: Normal range of motion. He exhibits no edema or tenderness. Neurological: He is alert and oriented to person, place, and time. No cranial nerve deficit. Coordination normal.   Skin: Skin is warm and dry. No rash noted. He is not diaphoretic. No erythema. Psychiatric: He has a normal mood and affect. His behavior is normal. Judgment normal.       MEDICAL DECISION MAKING:       ED Course as of May 03 1915   Fri May 03, 2019   [address] curtis inserted by RN, bloody urine output 1300 mL.   Starting CBI, checking labs, will admit for urology eval and CBI.    [WM]   1610 High WBC, HR over 90, ordered BC and LA and abx and IVF, code sepsis protocol, will admit him.    [WM]   65 DW Dr. Salvador Moy accepts admit  Repeat assessment on patient, he is stable, they agree with admit  Urine koolaid color now with CBI    [WM]   18 DW  [facility] urology, he will see patient    [WM]      ED Course User Index  [WM] Jorge Romero MD       Procedures    DIAGNOSTIC RESULTS     LABS: All lab results were reviewed by myself, and all abnormals are listed below. Labs Reviewed   URINALYSIS - Abnormal; Notable for the following components:       Result Value    Color, UA RED (*)     Turbidity UA TURBID (*)     Glucose, Ur 1+ (*)     Bilirubin Urine   (*)     Value: Presumptive positive. Unable to confirm due to unavailability of reagent. Ketones, Urine TRACE (*)     Urine Hgb LARGE (*)     Protein, UA 4+ (*)     Leukocyte Esterase, Urine MOD (*)     All other components within normal limits   CBC WITH AUTO DIFFERENTIAL - Abnormal; Notable for the following components:    WBC 15.5 (*)     RBC 4.25 (*)     Hemoglobin 12.6 (*)     Hematocrit 39.0 (*)     RDW 16.3 (*)     Seg Neutrophils 94 (*)     Lymphocytes 2 (*)     Segs Absolute 14.57 (*)     Absolute Lymph # 0.31 (*)     All other components within normal limits   COMPREHENSIVE METABOLIC PANEL - Abnormal; Notable for the following components:    Glucose 306 (*)     BUN 31 (*)     Total Bilirubin 1.64 (*)     All other components within normal limits   MICROSCOPIC URINALYSIS - Abnormal; Notable for the following components:    Bacteria, UA FEW (*)     Mucus, UA 2+ (*)     Other Observations UA   (*)     Value: INTERPRET WITH CAUTION DUE TO INTENSE COLOR OF URINE.     All other components within normal limits   URINE CULTURE CLEAN CATCH   CULTURE BLOOD #1   CULTURE BLOOD #1   PROTIME-INR   LACTIC ACID   LACTIC ACID   ICTOTEST, URINE   TYPE AND SCREEN       EMERGENCY DEPARTMENTCOURSE:         Vitals:    Vitals:    05/03/19 1459 05/03/19 1708 05/03/19 1805 05/03/19 1834   BP: (!) 181/87 (!) 162/78 (!) 162/72 (!) 169/78 Pulse: 96 76 71 71   Resp: 17 16 15 16   Temp: 98 °F (36.7 °C) 98 °F (36.7 °C) 98.1 °F (36.7 °C) 97.5 °F (36.4 °C)   TempSrc: Oral Oral  Oral   SpO2: 96% 97% 98% 100%   Weight: 177 lb (80.3 kg)      Height: 6' 0.5\" (1.842 m)          The patient was given the following medications while in the emergency department:  Orders Placed This Encounter   Medications    lidocaine (XYLOCAINE) 2 % DANNY Dunlap: cabinet override    DISCONTD: lidocaine (XYLOCAINE) 2 % jelly    cefTRIAXone (ROCEPHIN) 1 g IVPB in 50 mL D5W minibag    0.9 % sodium chloride infusion    sodium chloride flush 0.9 % injection 10 mL    sodium chloride flush 0.9 % injection 10 mL    acetaminophen (TYLENOL) tablet 650 mg    0.9 % sodium chloride infusion     CONSULTS:  IP CONSULT TO INTERNAL MEDICINE  IP CONSULT TO UROLOGY    FINAL IMPRESSION      1. Urinary retention    2. Gross hematuria    3.  Septicemia [facility])          DISPOSITION/PLAN   DISPOSITION Admitted 05/03/2019 05:00:29 PM      PATIENT REFERRED TO:  Val Nicole MD  [address]  [phone]          DISCHARGE MEDICATIONS:  Current Discharge Medication List        Andres Bay MD  Attending Emergency Physician                    Andres Bay MD  05/03/19 9601

## (undated) DEVICE — MODULE PMP INFUS SET 20 GTT 127 IN 27 CC 20 NEEDLELESS

## (undated) DEVICE — LASER SURG W22XH58IN D36IN 475LB SLD STATE FREQ DOUBLED

## (undated) DEVICE — GLOVE ORANGE PI 7   MSG9070

## (undated) DEVICE — DRAINBAG,ANTI-REFLUX TOWER,L/F,2000ML,LL: Brand: MEDLINE

## (undated) DEVICE — HF-RESECTION ELECTRODE PLASMALOOP LOOP, MEDIUM, 24 FR., 12°/16°, ESG TURIS: Brand: OLYMPUS

## (undated) DEVICE — DRAPE,REIN 53X77,STERILE: Brand: MEDLINE

## (undated) DEVICE — PACK PROCEDURE SURG CYSTO SVMMC LF

## (undated) DEVICE — DUP USE 291175 PAD GROUNDING ADULT 10FT CORD

## (undated) DEVICE — Device: Brand: OLYMPUS

## (undated) DEVICE — GLOVE ORANGE PI 7 1/2   MSG9075

## (undated) DEVICE — CATHETER SET URET BG ADPT ASPIR PRT CURITY

## (undated) DEVICE — CATHETER URETH 22FR BLLN 30CC 3 W F SPEC INF CTRL BARDX

## (undated) DEVICE — DALE FOLEY CATHETER HOLDER, LEGBAND, FITS UP TO 30": Brand: DALE FOLEY CATHETER HOLDER

## (undated) DEVICE — CONTAINER,SPECIMEN,4OZ,OR STRL: Brand: MEDLINE

## (undated) DEVICE — BAG,LEG,COMFORT-STRAPS,MEDIUM,20OZ: Brand: MEDLINE

## (undated) DEVICE — KENDALL SCD EXPRESS SLEEVES, KNEE LENGTH, MEDIUM: Brand: KENDALL SCD

## (undated) DEVICE — STRAP,CATHETER,ELASTIC,HOOK&LOOP: Brand: MEDLINE

## (undated) DEVICE — 60 ML SYRINGE,CATHETER TIP: Brand: MONOJECT

## (undated) DEVICE — CATHETER URETH 24FR BLLN 30CC SIL ALLY W/ SIL HYDRGEL 3 W F

## (undated) DEVICE — GARMENT,MEDLINE,DVT,INT,CALF,MED, GEN2: Brand: MEDLINE

## (undated) DEVICE — PROTECTOR ULN NRV PUR FOAM HK LOOP STRP ANATOMICALLY

## (undated) DEVICE — GLOVE SURG SZ 65 THK91MIL LTX FREE SYN POLYISOPRENE

## (undated) DEVICE — RENTAL LASER XPS CASE

## (undated) DEVICE — EVACUATOR URO BLDR W/ ADPT UROVAC

## (undated) DEVICE — GARMENT COMPR STD FOR 17IN CALF UNIF THER FLOTRN

## (undated) DEVICE — Z INACTIVE USE 2635503 SOLUTION IRRIG 3000ML ST H2O USP UROMATIC PLAS CONT

## (undated) DEVICE — BAG URIN DRNGE 2000ML VYN INF CTRL GRAD ANTI REFLX VLV

## (undated) DEVICE — BAG DRNGE 19OZ VYN LEG FLIP-FLO VLV FAB STRP DISPOZ-A-BG

## (undated) DEVICE — CATHETER PLUG WITH CAP: Brand: DOVER

## (undated) DEVICE — 60 ML SYRINGE LUER-LOCK TIP: Brand: MONOJECT